# Patient Record
Sex: FEMALE | Race: BLACK OR AFRICAN AMERICAN | URBAN - METROPOLITAN AREA
[De-identification: names, ages, dates, MRNs, and addresses within clinical notes are randomized per-mention and may not be internally consistent; named-entity substitution may affect disease eponyms.]

---

## 2019-12-06 ENCOUNTER — EMERGENCY (EMERGENCY)
Facility: HOSPITAL | Age: 59
LOS: 1 days | Discharge: HOME | End: 2019-12-06
Attending: EMERGENCY MEDICINE | Admitting: EMERGENCY MEDICINE
Payer: MEDICAID

## 2019-12-06 VITALS
OXYGEN SATURATION: 98 % | DIASTOLIC BLOOD PRESSURE: 81 MMHG | TEMPERATURE: 98 F | RESPIRATION RATE: 16 BRPM | HEART RATE: 89 BPM | SYSTOLIC BLOOD PRESSURE: 125 MMHG

## 2019-12-06 PROCEDURE — 99283 EMERGENCY DEPT VISIT LOW MDM: CPT

## 2019-12-06 NOTE — ED ADULT TRIAGE NOTE - CHIEF COMPLAINT QUOTE
pt arrives w/ c/o elevated glucose. pt states she has been off her medication for about 0 months due to her liver being enlarged. pt states she got scared and decided to stop all her medication. pt states she feels lightheaded and jittery. fsx in triage 268. pt appears comfortable and in NAD.

## 2019-12-07 VITALS
RESPIRATION RATE: 18 BRPM | SYSTOLIC BLOOD PRESSURE: 117 MMHG | HEART RATE: 68 BPM | TEMPERATURE: 97 F | DIASTOLIC BLOOD PRESSURE: 70 MMHG | OXYGEN SATURATION: 100 %

## 2019-12-07 LAB
ALBUMIN SERPL ELPH-MCNC: 3.8 G/DL — SIGNIFICANT CHANGE UP (ref 3.3–5)
ALP SERPL-CCNC: 142 U/L — HIGH (ref 40–120)
ALT FLD-CCNC: 22 U/L — SIGNIFICANT CHANGE UP (ref 4–33)
ANION GAP SERPL CALC-SCNC: 12 MMO/L — SIGNIFICANT CHANGE UP (ref 7–14)
APPEARANCE UR: SIGNIFICANT CHANGE UP
AST SERPL-CCNC: 22 U/L — SIGNIFICANT CHANGE UP (ref 4–32)
BACTERIA # UR AUTO: SIGNIFICANT CHANGE UP
BASE EXCESS BLDV CALC-SCNC: 4.3 MMOL/L — SIGNIFICANT CHANGE UP
BASOPHILS # BLD AUTO: 0.02 K/UL — SIGNIFICANT CHANGE UP (ref 0–0.2)
BASOPHILS NFR BLD AUTO: 0.2 % — SIGNIFICANT CHANGE UP (ref 0–2)
BILIRUB SERPL-MCNC: 0.3 MG/DL — SIGNIFICANT CHANGE UP (ref 0.2–1.2)
BILIRUB UR-MCNC: NEGATIVE — SIGNIFICANT CHANGE UP
BLOOD GAS VENOUS - CREATININE: 0.46 MG/DL — LOW (ref 0.5–1.3)
BLOOD UR QL VISUAL: HIGH
BUN SERPL-MCNC: 17 MG/DL — SIGNIFICANT CHANGE UP (ref 7–23)
CALCIUM SERPL-MCNC: 9.8 MG/DL — SIGNIFICANT CHANGE UP (ref 8.4–10.5)
CHLORIDE BLDV-SCNC: 107 MMOL/L — SIGNIFICANT CHANGE UP (ref 96–108)
CHLORIDE SERPL-SCNC: 102 MMOL/L — SIGNIFICANT CHANGE UP (ref 98–107)
CO2 SERPL-SCNC: 27 MMOL/L — SIGNIFICANT CHANGE UP (ref 22–31)
COLOR SPEC: SIGNIFICANT CHANGE UP
CREAT SERPL-MCNC: 0.43 MG/DL — LOW (ref 0.5–1.3)
EOSINOPHIL # BLD AUTO: 0.09 K/UL — SIGNIFICANT CHANGE UP (ref 0–0.5)
EOSINOPHIL NFR BLD AUTO: 1.1 % — SIGNIFICANT CHANGE UP (ref 0–6)
GAS PNL BLDV: 135 MMOL/L — LOW (ref 136–146)
GLUCOSE BLDV-MCNC: 265 MG/DL — HIGH (ref 70–99)
GLUCOSE SERPL-MCNC: 274 MG/DL — HIGH (ref 70–99)
GLUCOSE UR-MCNC: 1000 — HIGH
HCO3 BLDV-SCNC: 26 MMOL/L — SIGNIFICANT CHANGE UP (ref 20–27)
HCT VFR BLD CALC: 38.7 % — SIGNIFICANT CHANGE UP (ref 34.5–45)
HCT VFR BLDV CALC: 39.9 % — SIGNIFICANT CHANGE UP (ref 34.5–45)
HGB BLD-MCNC: 12.5 G/DL — SIGNIFICANT CHANGE UP (ref 11.5–15.5)
HGB BLDV-MCNC: 13 G/DL — SIGNIFICANT CHANGE UP (ref 11.5–15.5)
HYALINE CASTS # UR AUTO: SIGNIFICANT CHANGE UP
IMM GRANULOCYTES NFR BLD AUTO: 0.2 % — SIGNIFICANT CHANGE UP (ref 0–1.5)
KETONES UR-MCNC: HIGH
LACTATE BLDV-MCNC: 1.4 MMOL/L — SIGNIFICANT CHANGE UP (ref 0.5–2)
LEUKOCYTE ESTERASE UR-ACNC: SIGNIFICANT CHANGE UP
LYMPHOCYTES # BLD AUTO: 3.09 K/UL — SIGNIFICANT CHANGE UP (ref 1–3.3)
LYMPHOCYTES # BLD AUTO: 36.4 % — SIGNIFICANT CHANGE UP (ref 13–44)
MCHC RBC-ENTMCNC: 28.6 PG — SIGNIFICANT CHANGE UP (ref 27–34)
MCHC RBC-ENTMCNC: 32.3 % — SIGNIFICANT CHANGE UP (ref 32–36)
MCV RBC AUTO: 88.6 FL — SIGNIFICANT CHANGE UP (ref 80–100)
MONOCYTES # BLD AUTO: 0.57 K/UL — SIGNIFICANT CHANGE UP (ref 0–0.9)
MONOCYTES NFR BLD AUTO: 6.7 % — SIGNIFICANT CHANGE UP (ref 2–14)
NEUTROPHILS # BLD AUTO: 4.69 K/UL — SIGNIFICANT CHANGE UP (ref 1.8–7.4)
NEUTROPHILS NFR BLD AUTO: 55.4 % — SIGNIFICANT CHANGE UP (ref 43–77)
NITRITE UR-MCNC: NEGATIVE — SIGNIFICANT CHANGE UP
NRBC # FLD: 0 K/UL — SIGNIFICANT CHANGE UP (ref 0–0)
PCO2 BLDV: 53 MMHG — HIGH (ref 41–51)
PH BLDV: 7.36 PH — SIGNIFICANT CHANGE UP (ref 7.32–7.43)
PH UR: 6 — SIGNIFICANT CHANGE UP (ref 5–8)
PLATELET # BLD AUTO: 255 K/UL — SIGNIFICANT CHANGE UP (ref 150–400)
PMV BLD: 9.8 FL — SIGNIFICANT CHANGE UP (ref 7–13)
PO2 BLDV: < 24 MMHG — LOW (ref 35–40)
POTASSIUM BLDV-SCNC: 3.8 MMOL/L — SIGNIFICANT CHANGE UP (ref 3.4–4.5)
POTASSIUM SERPL-MCNC: 4.2 MMOL/L — SIGNIFICANT CHANGE UP (ref 3.5–5.3)
POTASSIUM SERPL-SCNC: 4.2 MMOL/L — SIGNIFICANT CHANGE UP (ref 3.5–5.3)
PROT SERPL-MCNC: 7.3 G/DL — SIGNIFICANT CHANGE UP (ref 6–8.3)
PROT UR-MCNC: 100 — HIGH
RBC # BLD: 4.37 M/UL — SIGNIFICANT CHANGE UP (ref 3.8–5.2)
RBC # FLD: 12.4 % — SIGNIFICANT CHANGE UP (ref 10.3–14.5)
RBC CASTS # UR COMP ASSIST: HIGH (ref 0–?)
SAO2 % BLDV: 31.7 % — LOW (ref 60–85)
SODIUM SERPL-SCNC: 141 MMOL/L — SIGNIFICANT CHANGE UP (ref 135–145)
SP GR SPEC: 1.03 — SIGNIFICANT CHANGE UP (ref 1–1.04)
SQUAMOUS # UR AUTO: SIGNIFICANT CHANGE UP
UROBILINOGEN FLD QL: NORMAL — SIGNIFICANT CHANGE UP
WBC # BLD: 8.48 K/UL — SIGNIFICANT CHANGE UP (ref 3.8–10.5)
WBC # FLD AUTO: 8.48 K/UL — SIGNIFICANT CHANGE UP (ref 3.8–10.5)
WBC UR QL: >50 — HIGH (ref 0–?)

## 2019-12-07 RX ORDER — METFORMIN HYDROCHLORIDE 850 MG/1
1 TABLET ORAL
Qty: 60 | Refills: 0
Start: 2019-12-07 | End: 2020-01-05

## 2019-12-07 RX ORDER — SODIUM CHLORIDE 9 MG/ML
1000 INJECTION INTRAMUSCULAR; INTRAVENOUS; SUBCUTANEOUS ONCE
Refills: 0 | Status: COMPLETED | OUTPATIENT
Start: 2019-12-07 | End: 2019-12-07

## 2019-12-07 RX ADMIN — SODIUM CHLORIDE 1000 MILLILITER(S): 9 INJECTION INTRAMUSCULAR; INTRAVENOUS; SUBCUTANEOUS at 03:06

## 2019-12-07 NOTE — ED ADULT NURSE REASSESSMENT NOTE - NS ED NURSE REASSESS COMMENT FT1
pt brought into private room for exam by MD Christina with female RN at bedside. pt endorses pain and burning around vaginal area. advised to treat known infections with already prescribed medications by outpatient doctor. VS as noted.

## 2019-12-07 NOTE — ED PROVIDER NOTE - PATIENT PORTAL LINK FT
You can access the FollowMyHealth Patient Portal offered by [facility] by registering at the following website: [email or web address]. By joining [employer]’s FollowMyHealth portal, you will also be able to view your health information using other applications (apps) compatible with our system.

## 2019-12-07 NOTE — ED PROVIDER NOTE - PROGRESS NOTE DETAILS
ADDISON:  GYN external exam with hyperemic vulva, whitish discharge from vagina, and bilateral ulcerated lesions.  Speculum exam deferred.  RN Mary Guerrero present during exam. ADDISON:  Labs reassuring and not c/w dka, although patient is with ketonuria.  UA with elevated wbc but suspect vaginitis rather than cystitis is culprit.  Will send culture.  Will discharge patient with metformin, which she has tolerated in past.  She has antiviral and antifungal rx filled from gynecologist. ADDISON:  Labs reassuring and not c/w dka, although patient is with ketonuria.  UA with elevated wbc but suspect vaginitis rather than cystitis as culprit. Will discharge patient with metformin, which she has tolerated in past.  She has antiviral and antifungal rx filled from gynecologist.

## 2019-12-07 NOTE — ED ADULT NURSE NOTE - OBJECTIVE STATEMENT
pt received to room 1, ambulatory with walker and also has cane at bedside. pt complains of burning urination, hx vaginitis went to outpatient GYN today. was told by GYN get blood sugar under control. pt type 2 diabetic non compliant with oral meds x 1 year because she was afraid of damaging her liver. pt shares she had a mass on her liver as seen on MRI. at GYN office FS was 325, in triage 268. pt states she regularly checks her sugar and its is in the 300s and 400s. IV placed, labs sent. VS as noted.

## 2019-12-07 NOTE — ED PROVIDER NOTE - CLINICAL SUMMARY MEDICAL DECISION MAKING FREE TEXT BOX
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    VSS AF.  Exam non-toxic appearing.  Suspect hyperglycemia in setting of medication non-compliance.  Will send labs, ua.  Dispo pending.

## 2019-12-07 NOTE — ED PROVIDER NOTE - NSFOLLOWUPINSTRUCTIONS_ED_ALL_ED_FT
1.  Please take metformin as directed.    2.  Return to care for weakness, abdominal pain, vomiting, chest pain, fevers, chills.   3.  Use the list given to you to establish care with a primary care doctor for management of your diabetes.   4  Follow with your gynecologist as scheduled.

## 2019-12-07 NOTE — ED PROVIDER NOTE - OBJECTIVE STATEMENT
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  Patient reports developing vaginal rash, itching and burning ~1 week ago.  States that she went to [place] ED 5 days ago then followed up with outpatient gynecologist one day ago who started patient on valcyclovir, topical acyclovir and diflucan for suspected vaginitis and herpes outbreak.  Patient presented to gynecologist with elevated blood sugar and was told that she needed to come to hospital to get sugar controlled or else prescribed antifungal and antiviral "would not work".  Patient reports taking metformin and another medication for her diabetes, however has been off meds for ~1 year due to "enlarged liver".  Patient denies vomiting, abdominal pain, fevers, chills, chest pain, weakness.  Does not have PCP at this time.

## 2019-12-07 NOTE — ED PROVIDER NOTE - ATTENDING CONTRIBUTION TO CARE
I have personally performed a face to face bedside history and physical examination of this patient. I have discussed the history, examination, review of systems, assessment and plan of management with the resident. I have reviewed the electronic medical record and amended it to reflect my history, review of systems, physical exam, assessment and plan.

## 2019-12-07 NOTE — ED ADULT NURSE NOTE - NSIMPLEMENTINTERV_GEN_ALL_ED
Implemented All Fall Risk Interventions:  [place] to call system. Call bell, personal items and telephone within reach. Instruct patient to call for assistance. Room bathroom lighting operational. Non-slip footwear when patient is off stretcher. Physically safe environment: no spills, clutter or unnecessary equipment. Stretcher in lowest position, wheels locked, appropriate side rails in place. Provide visual cue, wrist band, yellow gown, etc. Monitor gait and stability. Monitor for mental status changes and reorient to person, place, and time. Review medications for side effects contributing to fall risk. Reinforce activity limits and safety measures with patient and family.

## 2021-01-14 ENCOUNTER — EMERGENCY (EMERGENCY)
Facility: HOSPITAL | Age: 61
LOS: 1 days | Discharge: HOME | End: 2021-01-14
Attending: EMERGENCY MEDICINE | Admitting: EMERGENCY MEDICINE
Payer: MEDICAID

## 2021-01-14 VITALS
SYSTOLIC BLOOD PRESSURE: 132 MMHG | OXYGEN SATURATION: 100 % | DIASTOLIC BLOOD PRESSURE: 85 MMHG | TEMPERATURE: 100 F | HEART RATE: 105 BPM | RESPIRATION RATE: 20 BRPM

## 2021-01-14 PROCEDURE — 99284 EMERGENCY DEPT VISIT MOD MDM: CPT

## 2021-01-14 NOTE — ED ADULT TRIAGE NOTE - CHIEF COMPLAINT QUOTE
c/o n+v, cough, fatigue, pain and aches to back of legs. Hx DM, HTN c/o n+v, cough, fatigue, pain and aches to back of legs. Also endorsing escoriations and laceration to vagina that burns with urination. States "I have a diabetic infection." Hx DM, HTN

## 2021-01-15 VITALS
RESPIRATION RATE: 16 BRPM | TEMPERATURE: 100 F | OXYGEN SATURATION: 98 % | SYSTOLIC BLOOD PRESSURE: 134 MMHG | DIASTOLIC BLOOD PRESSURE: 76 MMHG | HEART RATE: 86 BPM

## 2021-01-15 PROBLEM — E11.9 TYPE 2 DIABETES MELLITUS WITHOUT COMPLICATIONS: Chronic | Status: ACTIVE | Noted: 2019-12-07

## 2021-01-15 LAB
ALBUMIN SERPL ELPH-MCNC: 3.9 G/DL — SIGNIFICANT CHANGE UP (ref 3.3–5)
ALP SERPL-CCNC: 133 U/L — HIGH (ref 40–120)
ALT FLD-CCNC: 34 U/L — HIGH (ref 4–33)
ANION GAP SERPL CALC-SCNC: 13 MMOL/L — SIGNIFICANT CHANGE UP (ref 7–14)
APPEARANCE UR: CLEAR — SIGNIFICANT CHANGE UP
AST SERPL-CCNC: 32 U/L — SIGNIFICANT CHANGE UP (ref 4–32)
B PERT DNA SPEC QL NAA+PROBE: SIGNIFICANT CHANGE UP
BACTERIA # UR AUTO: ABNORMAL
BASOPHILS # BLD AUTO: 0 K/UL — SIGNIFICANT CHANGE UP (ref 0–0.2)
BASOPHILS NFR BLD AUTO: 0 % — SIGNIFICANT CHANGE UP (ref 0–2)
BILIRUB SERPL-MCNC: 0.3 MG/DL — SIGNIFICANT CHANGE UP (ref 0.2–1.2)
BILIRUB UR-MCNC: NEGATIVE — SIGNIFICANT CHANGE UP
BLOOD GAS VENOUS COMPREHENSIVE RESULT: SIGNIFICANT CHANGE UP
BUN SERPL-MCNC: 8 MG/DL — SIGNIFICANT CHANGE UP (ref 7–23)
C PNEUM DNA SPEC QL NAA+PROBE: SIGNIFICANT CHANGE UP
CALCIUM SERPL-MCNC: 9.5 MG/DL — SIGNIFICANT CHANGE UP (ref 8.4–10.5)
CHLORIDE SERPL-SCNC: 92 MMOL/L — LOW (ref 98–107)
CO2 SERPL-SCNC: 27 MMOL/L — SIGNIFICANT CHANGE UP (ref 22–31)
COLOR SPEC: YELLOW — SIGNIFICANT CHANGE UP
CREAT SERPL-MCNC: 0.6 MG/DL — SIGNIFICANT CHANGE UP (ref 0.5–1.3)
DIFF PNL FLD: ABNORMAL
EOSINOPHIL # BLD AUTO: 0 K/UL — SIGNIFICANT CHANGE UP (ref 0–0.5)
EOSINOPHIL NFR BLD AUTO: 0 % — SIGNIFICANT CHANGE UP (ref 0–6)
EPI CELLS # UR: 2 /HPF — SIGNIFICANT CHANGE UP (ref 0–5)
FLUAV H1 2009 PAND RNA SPEC QL NAA+PROBE: SIGNIFICANT CHANGE UP
FLUAV H1 RNA SPEC QL NAA+PROBE: SIGNIFICANT CHANGE UP
FLUAV H3 RNA SPEC QL NAA+PROBE: SIGNIFICANT CHANGE UP
FLUAV SUBTYP SPEC NAA+PROBE: SIGNIFICANT CHANGE UP
FLUBV RNA SPEC QL NAA+PROBE: SIGNIFICANT CHANGE UP
GLUCOSE SERPL-MCNC: 421 MG/DL — HIGH (ref 70–99)
GLUCOSE UR QL: ABNORMAL
HADV DNA SPEC QL NAA+PROBE: SIGNIFICANT CHANGE UP
HCOV PNL SPEC NAA+PROBE: SIGNIFICANT CHANGE UP
HCT VFR BLD CALC: 41.3 % — SIGNIFICANT CHANGE UP (ref 34.5–45)
HGB BLD-MCNC: 13.2 G/DL — SIGNIFICANT CHANGE UP (ref 11.5–15.5)
HMPV RNA SPEC QL NAA+PROBE: SIGNIFICANT CHANGE UP
HPIV1 RNA SPEC QL NAA+PROBE: SIGNIFICANT CHANGE UP
HPIV2 RNA SPEC QL NAA+PROBE: SIGNIFICANT CHANGE UP
HPIV3 RNA SPEC QL NAA+PROBE: SIGNIFICANT CHANGE UP
HPIV4 RNA SPEC QL NAA+PROBE: SIGNIFICANT CHANGE UP
IANC: 4.24 K/UL — SIGNIFICANT CHANGE UP (ref 1.5–8.5)
IMM GRANULOCYTES NFR BLD AUTO: 0.5 % — SIGNIFICANT CHANGE UP (ref 0–1.5)
KETONES UR-MCNC: ABNORMAL
LEUKOCYTE ESTERASE UR-ACNC: ABNORMAL
LYMPHOCYTES # BLD AUTO: 1.85 K/UL — SIGNIFICANT CHANGE UP (ref 1–3.3)
LYMPHOCYTES # BLD AUTO: 28.3 % — SIGNIFICANT CHANGE UP (ref 13–44)
MCHC RBC-ENTMCNC: 28.4 PG — SIGNIFICANT CHANGE UP (ref 27–34)
MCHC RBC-ENTMCNC: 32 GM/DL — SIGNIFICANT CHANGE UP (ref 32–36)
MCV RBC AUTO: 88.8 FL — SIGNIFICANT CHANGE UP (ref 80–100)
MONOCYTES # BLD AUTO: 0.41 K/UL — SIGNIFICANT CHANGE UP (ref 0–0.9)
MONOCYTES NFR BLD AUTO: 6.3 % — SIGNIFICANT CHANGE UP (ref 2–14)
NEUTROPHILS # BLD AUTO: 4.24 K/UL — SIGNIFICANT CHANGE UP (ref 1.8–7.4)
NEUTROPHILS NFR BLD AUTO: 64.9 % — SIGNIFICANT CHANGE UP (ref 43–77)
NITRITE UR-MCNC: POSITIVE
NRBC # BLD: 0 /100 WBCS — SIGNIFICANT CHANGE UP
NRBC # FLD: 0 K/UL — SIGNIFICANT CHANGE UP
PH UR: 6.5 — SIGNIFICANT CHANGE UP (ref 5–8)
PLATELET # BLD AUTO: 200 K/UL — SIGNIFICANT CHANGE UP (ref 150–400)
POTASSIUM SERPL-MCNC: 3.8 MMOL/L — SIGNIFICANT CHANGE UP (ref 3.5–5.3)
POTASSIUM SERPL-SCNC: 3.8 MMOL/L — SIGNIFICANT CHANGE UP (ref 3.5–5.3)
PROT SERPL-MCNC: 7.7 G/DL — SIGNIFICANT CHANGE UP (ref 6–8.3)
PROT UR-MCNC: ABNORMAL
RAPID RVP RESULT: DETECTED
RBC # BLD: 4.65 M/UL — SIGNIFICANT CHANGE UP (ref 3.8–5.2)
RBC # FLD: 12.1 % — SIGNIFICANT CHANGE UP (ref 10.3–14.5)
RBC CASTS # UR COMP ASSIST: 0 /HPF — SIGNIFICANT CHANGE UP (ref 0–4)
RV+EV RNA SPEC QL NAA+PROBE: SIGNIFICANT CHANGE UP
SARS-COV-2 RNA SPEC QL NAA+PROBE: DETECTED
SODIUM SERPL-SCNC: 132 MMOL/L — LOW (ref 135–145)
SP GR SPEC: 1.04 — HIGH (ref 1.01–1.02)
UROBILINOGEN FLD QL: SIGNIFICANT CHANGE UP
WBC # BLD: 6.53 K/UL — SIGNIFICANT CHANGE UP (ref 3.8–10.5)
WBC # FLD AUTO: 6.53 K/UL — SIGNIFICANT CHANGE UP (ref 3.8–10.5)
WBC UR QL: 11 /HPF — HIGH (ref 0–5)

## 2021-01-15 PROCEDURE — 93010 ELECTROCARDIOGRAM REPORT: CPT

## 2021-01-15 PROCEDURE — 71045 X-RAY EXAM CHEST 1 VIEW: CPT | Mod: 26

## 2021-01-15 RX ORDER — CEPHALEXIN 500 MG
1 CAPSULE ORAL
Qty: 14 | Refills: 0
Start: 2021-01-15 | End: 2021-01-21

## 2021-01-15 RX ORDER — FLUCONAZOLE 150 MG/1
200 TABLET ORAL ONCE
Refills: 0 | Status: DISCONTINUED | OUTPATIENT
Start: 2021-01-15 | End: 2021-01-15

## 2021-01-15 RX ORDER — CEPHALEXIN 500 MG
500 CAPSULE ORAL ONCE
Refills: 0 | Status: COMPLETED | OUTPATIENT
Start: 2021-01-15 | End: 2021-01-15

## 2021-01-15 RX ORDER — SODIUM CHLORIDE 9 MG/ML
1000 INJECTION INTRAMUSCULAR; INTRAVENOUS; SUBCUTANEOUS ONCE
Refills: 0 | Status: COMPLETED | OUTPATIENT
Start: 2021-01-15 | End: 2021-01-15

## 2021-01-15 RX ORDER — ONDANSETRON 8 MG/1
1 TABLET, FILM COATED ORAL
Qty: 9 | Refills: 0
Start: 2021-01-15 | End: 2021-01-17

## 2021-01-15 RX ORDER — ONDANSETRON 8 MG/1
4 TABLET, FILM COATED ORAL ONCE
Refills: 0 | Status: COMPLETED | OUTPATIENT
Start: 2021-01-15 | End: 2021-01-15

## 2021-01-15 RX ORDER — INSULIN LISPRO 100/ML
8 VIAL (ML) SUBCUTANEOUS ONCE
Refills: 0 | Status: COMPLETED | OUTPATIENT
Start: 2021-01-15 | End: 2021-01-15

## 2021-01-15 RX ORDER — ACYCLOVIR SODIUM 500 MG
400 VIAL (EA) INTRAVENOUS ONCE
Refills: 0 | Status: COMPLETED | OUTPATIENT
Start: 2021-01-15 | End: 2021-01-15

## 2021-01-15 RX ORDER — ACETAMINOPHEN 500 MG
975 TABLET ORAL ONCE
Refills: 0 | Status: COMPLETED | OUTPATIENT
Start: 2021-01-15 | End: 2021-01-15

## 2021-01-15 RX ORDER — ACYCLOVIR SODIUM 500 MG
1 VIAL (EA) INTRAVENOUS
Qty: 21 | Refills: 0
Start: 2021-01-15 | End: 2021-01-21

## 2021-01-15 RX ORDER — CEFTRIAXONE 500 MG/1
1000 INJECTION, POWDER, FOR SOLUTION INTRAMUSCULAR; INTRAVENOUS ONCE
Refills: 0 | Status: DISCONTINUED | OUTPATIENT
Start: 2021-01-15 | End: 2021-01-15

## 2021-01-15 RX ORDER — FLUCONAZOLE 150 MG/1
150 TABLET ORAL ONCE
Refills: 0 | Status: COMPLETED | OUTPATIENT
Start: 2021-01-15 | End: 2021-01-15

## 2021-01-15 RX ADMIN — FLUCONAZOLE 150 MILLIGRAM(S): 150 TABLET ORAL at 02:42

## 2021-01-15 RX ADMIN — Medication 400 MILLIGRAM(S): at 02:37

## 2021-01-15 RX ADMIN — ONDANSETRON 4 MILLIGRAM(S): 8 TABLET, FILM COATED ORAL at 02:37

## 2021-01-15 RX ADMIN — Medication 975 MILLIGRAM(S): at 02:37

## 2021-01-15 RX ADMIN — Medication 500 MILLIGRAM(S): at 03:39

## 2021-01-15 RX ADMIN — Medication 8 UNIT(S): at 03:31

## 2021-01-15 RX ADMIN — SODIUM CHLORIDE 1000 MILLILITER(S): 9 INJECTION INTRAMUSCULAR; INTRAVENOUS; SUBCUTANEOUS at 02:37

## 2021-01-15 NOTE — ED ADULT NURSE NOTE - OBJECTIVE STATEMENT
David RN: Pt presents to TRA AO4 ambulatory complaining of abd pain and vaginal lesions. Pt states "candace been having body aches and nausea, I also have herpes acting up and it hurts when I pee." Pt endorses being T2DM, uncontrolled, stating "I just got my insulin the other day and started using it again." Pt abd soft nontender, denies any chest pain sob or diff breathing, appears comfortable. 20g IV placed to R AC labs drawn and sent and medicated as per EMR.

## 2021-01-15 NOTE — ED PROVIDER NOTE - OBJECTIVE STATEMENT
61yo F hx dm HSV2 p/w 5 days of nausea, body aches, cough, shortness of breath, lightheadedness and subjective fevers. Also notes that she has had worsening burning rash to vagina. Previously had similar sx and tx for fungal infx and HSV in past.

## 2021-01-15 NOTE — PROVIDER CONTACT NOTE (OTHER) - ASSESSMENT
SW contacted pts rosanne Smith ([phone] BERTA contacted pts rosanne Smith ([phone]) to confirm pts address ([address] [place] [address]). BERTA contacted [facility] ([phone]) to arrange ambulette. Trip #[id number]. SW awaiting vendor assignment at this time.

## 2021-01-15 NOTE — ED ADULT NURSE NOTE - CHIEF COMPLAINT QUOTE
c/o n+v, cough, fatigue, pain and aches to back of legs. Also endorsing escoriations and laceration to vagina that burns with urination. States "I have a diabetic infection." Hx DM, HTN

## 2021-01-15 NOTE — ED ADULT NURSE NOTE - CHIEF COMPLAINT
RADIATION ONCOLOGY CONSULT    DATE OF SERVICE: 9/18/2017    IDENTIFICATION: A 61 y.o. female with a pTicN0 invasive ductal carcinoma ultimately grade 3 HER-2/soo triple positive ER 60% DC negative KI 67 10% status post lumpectomy and 2 negative sentinel nodes.  She is here at the kind request of Dr. Atwood.      HISTORY OF PRESENT ILLNESS: Patient's history dates back to June of this year when on 6/19/2017 she found a new nodule in the mid right breast. Spot compression views and ultrasound were recommended and these were performed on 7/6/2017. This confirmed the small irregular nodule in the lower outer right breast and on ultrasound this measured 1.0 x 0.7 x 0.8 there was no suspicious axillary adenopathy. She then underwent a biopsy on 7/19/2017 this revealed a grade 2 ductal carcinoma tumor was ER 60% DC negative KI 67 was 10%. HER-2/soo was 3+ positive. She then underwent a lumpectomy and sentinel node dissection 2 nodes were negative this was done on 8/29/2017. At this point the tumor was grade 3 in the tumor measured 1.6 x 0.9 x 0.7. Again it was an invasive ductal carcinoma and ultimately all margins were negative. Postoperatively she's been doing well she has plans to see Dr. Nick harrison in the next couple weeks.    PAST MEDICAL HISTORY:   Past Medical History:   Diagnosis Date   • Cancer (CMS-HCC)     breast   • High cholesterol    • Hypertension        PAST SURGICAL HISTORY:  Past Surgical History:   Procedure Laterality Date   • LUMPECTOMY Right 8/29/2017    Procedure: LUMPECTOMY;  Surgeon: Chava Atwood M.D.;  Location: SURGERY [facility];  Service: General   • NODE BIOPSY SENTINEL  8/29/2017    Procedure: NODE BIOPSY SENTINEL;  Surgeon: Chava Atwood M.D.;  Location: SURGERY [facility];  Service: General       GYNECOLOGICAL STATUS:G-5, P-1.  1st birth 21, 1st menses 15, LMP 44       HORMONE USE:  BCP x 3 years, no HRT.       CURRENT MEDICATIONS:  Current Outpatient Prescriptions    Medication Sig Dispense Refill   • triamterene-hctz (MAXZIDE-25/DYAZIDE) 37.5-25 MG Tab Take 1 Tab by mouth every day.     • acetaminophen (TYLENOL) 325 MG Tab Take 650 mg by mouth Once.     • amlodipine (NORVASC) 5 MG Tab Take 5 mg by mouth every day.     • phentermine 30 MG capsule Take 30 mg by mouth every morning.     • docusate sodium (COLACE) 100 MG Cap Take 100 mg by mouth every day.     • hydrocodone-acetaminophen (NORCO) 5-325 MG Tab per tablet Take 1-2 Tabs by mouth every four hours as needed. (Patient not taking: Reported on 9/18/2017) 12 Tab 0   • promethazine (PHENERGAN) 25 MG Tab Take 0.5 Tabs by mouth every 6 hours as needed for Nausea/Vomiting. (Patient not taking: Reported on 9/18/2017) 8 Tab 0     No current facility-administered medications for this encounter.        ALLERGIES:    Review of patient's allergies indicates no known allergies.    FAMILY HISTORY:    Family History   Problem Relation Age of Onset   • Cancer Mother    • Lung Disease Mother    • Heart Disease Father    • No Known Problems Sister    • Cancer Maternal Grandmother      bone in leg   • Diabetes Maternal Grandmother    • No Known Problems Sister    [unfilled]        SOCIAL HISTORY:     reports that she has never smoked. She has never used smokeless tobacco. She reports that she drinks alcohol.  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    Lives with: her daughter and granddaughter in [facility].      REVIEW OF SYSTEMS: Pertinent positives consist ofFatigue and a little but a pain in the axilla.  The rest of the review of systems is negative and has been reviewed by me. It is in the nursing note dated 9/18/2017 in [facility]  PHYSICAL EXAM:    1= Restricted in physically strenuous activity, but ambulatory and able to carry out work of a light sedentary nature, e.g., light housework, office work.  Vitals:    09/18/17 1016   BP: 135/79   Pulse: 98   Temp: 37.2 °C (99 °F)   SpO2: 93%   Weight: 122 kg (269 lb)  "  Height: 1.702 m (5' 7\")   Location: Axilla, right   Pain: 1        What makes the pain worse: reaching       GENERAL:Well-appearing alert and oriented ×3 in no apparent distress  Breasts: Bilaterally pendulous with fibroglandular changes no discrete masses she does have postoperative changes in the lower outer quadrant of the right breast consistent with the recent surgery. She also has an axillary scar which is somewhat tender and starting to keloid. Will masses otherwise are appreciated in either breast or axilla.  HEENT:  Pupils are equal, round, and reactive to light.  Extraocular muscles   are intact. Sclerae nonicteric.  Conjunctivae pink.  Oral cavity, tongue   protrudes midline.   NECK:  Supple without evidence of thyromegaly.  NODES:  No peripheral adenopathy of the neck, supraclavicular fossa or axillae   bilaterally.  LUNGS:  Clear to ascultation   HEART:  Regular rate and rhythm.  No murmur appreciated  ABDOMEN:  Soft. No evidence of hepatosplenomegaly.  Positive bowel sounds.  EXTREMITIES:  Without Edema.  NEUROLOGIC:  Cranial nerves II through XII were intact. Normal stance and gait motor and sensory grossly within normal limits        LABORATORY DATA:   Lab Results   Component Value Date/Time    SODIUM 136 08/08/2017 10:13 AM    POTASSIUM 3.9 08/08/2017 10:13 AM    CHLORIDE 98 08/08/2017 10:13 AM    CO2 28 08/08/2017 10:13 AM    GLUCOSE 112 (H) 08/08/2017 10:13 AM    BUN 11 08/08/2017 10:13 AM    CREATININE 0.85 08/08/2017 10:13 AM     Lab Results   Component Value Date/Time    ALKPHOSPHAT 85 08/08/2017 10:13 AM    ASTSGOT 22 08/08/2017 10:13 AM    ALTSGPT 22 08/08/2017 10:13 AM    TBILIRUBIN 0.9 08/08/2017 10:13 AM      Lab Results   Component Value Date/Time    WBC 6.1 08/08/2017 10:13 AM    RBC 5.40 08/08/2017 10:13 AM    HEMOGLOBIN 17.0 (H) 08/08/2017 10:13 AM    HEMATOCRIT 48.7 (H) 08/08/2017 10:13 AM    MCV 90.2 08/08/2017 10:13 AM    MCH 31.5 08/08/2017 10:13 AM    MCHC 34.9 08/08/2017 10:13 " AM    MPV 9.1 08/08/2017 10:13 AM    NEUTSPOLYS 55.10 08/08/2017 10:13 AM    LYMPHOCYTES 33.10 08/08/2017 10:13 AM    MONOCYTES 9.20 08/08/2017 10:13 AM    EOSINOPHILS 1.60 08/08/2017 10:13 AM    BASOPHILS 0.50 08/08/2017 10:13 AM            IMPRESSION:    A 61 y.o. withStage IA, pathologic T1 cN0 breast cancer. It is in the lower outer right breast and is ER 60% SC negative KI 6710% HER-2/soo triple positive grade 3 infiltrating ductal carcinoma status post lumpectomy and sentinel node dissection with negative margins.      RECOMMENDATIONS:   I discussed the patient's she's needs to see Dr. Romero and most likely will undergone Oncotype DX test to check and see if she is a candidate for chemotherapy or not. Once this is done I discussed that radiation therapy would be after chemotherapy if she requires it. If she only requires an aromatase inhibitor then she can start the radiation therapy once she recovers from the surgery.  I've described the details of radiation along with the side effects both acute and chronic, including but not exclusive to fatigue, skin reaction, local soreness, swelling, delayed healing, scarring fibrosis discoloration. Ample time was allowed for questions, and patient understands.  After she has seen Dr. Romero and the type results in she knows to give us a call.      Thank you for the opportunity to participate in her care.  If any questions or comments, please do not hesitate in calling.    Please note that this dictation was created using voice recognition software. I have made every reasonable attempt to correct obvious errors, but I expect that there are errors of grammar and possibly content that I did not discover before finalizing the note.       The patient is a 60y Female complaining of

## 2021-01-15 NOTE — ED PROVIDER NOTE - NS ED ROS FT
CONSTITUTIONAL: no dizziness  EYES: no visual changes, no eye pain  EARS: no ear drainage, no ear pain, no change in hearing  NOSE: no nasal congestion  MOUTH/THROAT: no sore throat  CV: No chest pain, no palpitations  RESP: see hpi  GI: No v/d, no abd pain  : no dysuria, no hematuria, no flank pain  MSK: no back pain, no extremity pain  SKIN: see hpi  NEURO: no headache, no focal weakness, no decreased sensation/paresthesias

## 2021-01-15 NOTE — ED ADULT NURSE REASSESSMENT NOTE - NS ED NURSE REASSESS COMMENT FT1
Break RN: Pt offers no complaints at this time, isolation precautions applied. Awaiting for , pt is discharge.

## 2021-01-15 NOTE — ED ADULT NURSE REASSESSMENT NOTE - NS ED NURSE REASSESS COMMENT FT1
pt resting comfortably in bed. VS as noted. awaiting . pt remains in NAD. denies complaints at this time. bed in lowest position, side rails up, call bell in hand, safety maintained. will continue to monitor.

## 2021-01-15 NOTE — ED PROVIDER NOTE - ATTENDING CONTRIBUTION TO CARE
61 y/o morbidly obese F with h/o DM here with 5 days of body aches, nausea, cough, SOB, subjective fever and chills.  Pt lives at home with mult family members who are still interactive in the community, but denies any specific known sick contacts.  Pt also reports worsening pain and itching to perineal region.  No vomiting or diarrhea, hematuria, urinary urgency or dysuria.  Well appearing, morbidly obese, lying in stretcher, awake and alert, nontoxic.  AF/VSS.  Lungs cta bl.  Cards nl S1/S2, RRR, no MRG.  Abd soft obese ntnd.   region with diffuse red, raised, tender rash to vulva, scattered to prox medial thighs, no fluctuance, blistering/bullae, or crepitus.  Plan for labs, cxr, covid pcr - sxs likely infectious with high suspicion for viral infection (covid 19).  Pt with e/o vulvovaginitis/fungal skin infection to inner thigh/perineal region.  There is no e/o abscess, cellulitis, or fourniers.  WIll treat with fluconazole, supportive care, reassess.

## 2021-01-15 NOTE — ED PROVIDER NOTE - PATIENT PORTAL LINK FT
You can access the FollowMyHealth Patient Portal offered by [facility] by registering at the following website: [email or web address]. By joining [employer]’s FollowMyHealth portal, you will also be able to view your health information using other applications (apps) compatible with our system.

## 2021-01-15 NOTE — ED PROVIDER NOTE - PHYSICAL EXAMINATION
Gen: Morbidly obese, NAD  Head: NCAT  HEENT: PERRL, MMM, normal conjunctiva, anicteric, neck supple  Lung: CTAB, no adventitious sounds  CV: RRR, no murmurs  Abd: soft, NTND, no rebound or guarding, no CVAT  MSK: No edema, no visible deformities  Neuro: Moving all extremity grossly  Skin: 1 herpetic lesion lateral to L vulva, mild eryrthema surrounding introitus w/ scant candidal discharge, no induration to erythematous area, no perineal induration/erythema

## 2021-01-15 NOTE — ED PROVIDER NOTE - NSFOLLOWUPINSTRUCTIONS_ED_ALL_ED_FT
- You will receive a call in 24 hours in regards to follow up for your COVID diagnosis    - You were seen today for COVID-19, vaginal candidiasis, urinary tract infection, and a herpes infection    - Please continue your new antibiotics and antivirals for herpes infection and urinary tract infection    - Keep yourself hydrated. Take tylenol/motrin as directed as needed for fever/aches    - Please follow up with your primary doctor in 48 hours for follow up

## 2021-01-15 NOTE — ED PROVIDER NOTE - CLINICAL SUMMARY MEDICAL DECISION MAKING FREE TEXT BOX
Constitution of viral sx w/ likely candidal rash and hsv infx. No s/s sharad's or perineal cellulitis or herpetic superinfection superimposed. Labs, xr, covid and will tx for hsv/vaginal candidiasis

## 2021-01-18 ENCOUNTER — INPATIENT (INPATIENT)
Facility: HOSPITAL | Age: 61
LOS: 3 days | Discharge: HOME HEALTH | End: 2021-01-22
Attending: HOSPITALIST | Admitting: HOSPITALIST
Payer: MEDICAID

## 2021-01-18 VITALS
OXYGEN SATURATION: 95 % | TEMPERATURE: 98 F | RESPIRATION RATE: 20 BRPM | DIASTOLIC BLOOD PRESSURE: 73 MMHG | SYSTOLIC BLOOD PRESSURE: 126 MMHG | HEART RATE: 103 BPM

## 2021-01-18 DIAGNOSIS — E11.9 TYPE 2 DIABETES MELLITUS WITHOUT COMPLICATIONS: ICD-10-CM

## 2021-01-18 DIAGNOSIS — Z29.9 ENCOUNTER FOR PROPHYLACTIC MEASURES, UNSPECIFIED: ICD-10-CM

## 2021-01-18 DIAGNOSIS — I10 ESSENTIAL (PRIMARY) HYPERTENSION: ICD-10-CM

## 2021-01-18 DIAGNOSIS — N39.0 URINARY TRACT INFECTION, SITE NOT SPECIFIED: ICD-10-CM

## 2021-01-18 DIAGNOSIS — U07.1 COVID-19: ICD-10-CM

## 2021-01-18 LAB
ALBUMIN SERPL ELPH-MCNC: 3.8 G/DL — SIGNIFICANT CHANGE UP (ref 3.3–5)
ALP SERPL-CCNC: 121 U/L — HIGH (ref 40–120)
ALT FLD-CCNC: 34 U/L — HIGH (ref 4–33)
ANION GAP SERPL CALC-SCNC: 10 MMOL/L — SIGNIFICANT CHANGE UP (ref 7–14)
ANION GAP SERPL CALC-SCNC: 13 MMOL/L — SIGNIFICANT CHANGE UP (ref 7–14)
APPEARANCE UR: CLEAR — SIGNIFICANT CHANGE UP
AST SERPL-CCNC: 45 U/L — HIGH (ref 4–32)
B PERT DNA SPEC QL NAA+PROBE: SIGNIFICANT CHANGE UP
B-OH-BUTYR SERPL-SCNC: 0.7 MMOL/L — HIGH (ref 0–0.4)
BASE EXCESS BLDV CALC-SCNC: 6 MMOL/L — HIGH (ref -3–2)
BASOPHILS # BLD AUTO: 0.01 K/UL — SIGNIFICANT CHANGE UP (ref 0–0.2)
BASOPHILS NFR BLD AUTO: 0.1 % — SIGNIFICANT CHANGE UP (ref 0–2)
BILIRUB SERPL-MCNC: 0.4 MG/DL — SIGNIFICANT CHANGE UP (ref 0.2–1.2)
BILIRUB UR-MCNC: NEGATIVE — SIGNIFICANT CHANGE UP
BLOOD GAS VENOUS COMPREHENSIVE RESULT: SIGNIFICANT CHANGE UP
BUN SERPL-MCNC: 6 MG/DL — LOW (ref 7–23)
BUN SERPL-MCNC: 8 MG/DL — SIGNIFICANT CHANGE UP (ref 7–23)
C PNEUM DNA SPEC QL NAA+PROBE: SIGNIFICANT CHANGE UP
CALCIUM SERPL-MCNC: 9.1 MG/DL — SIGNIFICANT CHANGE UP (ref 8.4–10.5)
CALCIUM SERPL-MCNC: 9.3 MG/DL — SIGNIFICANT CHANGE UP (ref 8.4–10.5)
CHLORIDE SERPL-SCNC: 101 MMOL/L — SIGNIFICANT CHANGE UP (ref 98–107)
CHLORIDE SERPL-SCNC: 97 MMOL/L — LOW (ref 98–107)
CO2 SERPL-SCNC: 27 MMOL/L — SIGNIFICANT CHANGE UP (ref 22–31)
CO2 SERPL-SCNC: 27 MMOL/L — SIGNIFICANT CHANGE UP (ref 22–31)
COLOR SPEC: SIGNIFICANT CHANGE UP
CREAT SERPL-MCNC: 0.37 MG/DL — LOW (ref 0.5–1.3)
CREAT SERPL-MCNC: 0.52 MG/DL — SIGNIFICANT CHANGE UP (ref 0.5–1.3)
D DIMER BLD IA.RAPID-MCNC: 272 NG/ML DDU — HIGH
DIFF PNL FLD: NEGATIVE — SIGNIFICANT CHANGE UP
EOSINOPHIL # BLD AUTO: 0 K/UL — SIGNIFICANT CHANGE UP (ref 0–0.5)
EOSINOPHIL NFR BLD AUTO: 0 % — SIGNIFICANT CHANGE UP (ref 0–6)
FERRITIN SERPL-MCNC: 658 NG/ML — HIGH (ref 15–150)
FLUAV H1 2009 PAND RNA SPEC QL NAA+PROBE: SIGNIFICANT CHANGE UP
FLUAV H1 RNA SPEC QL NAA+PROBE: SIGNIFICANT CHANGE UP
FLUAV H3 RNA SPEC QL NAA+PROBE: SIGNIFICANT CHANGE UP
FLUAV SUBTYP SPEC NAA+PROBE: SIGNIFICANT CHANGE UP
FLUBV RNA SPEC QL NAA+PROBE: SIGNIFICANT CHANGE UP
GLUCOSE BLDC GLUCOMTR-MCNC: 364 MG/DL — HIGH (ref 70–99)
GLUCOSE BLDC GLUCOMTR-MCNC: 365 MG/DL — HIGH (ref 70–99)
GLUCOSE BLDC GLUCOMTR-MCNC: 427 MG/DL — HIGH (ref 70–99)
GLUCOSE SERPL-MCNC: 384 MG/DL — HIGH (ref 70–99)
GLUCOSE SERPL-MCNC: 453 MG/DL — CRITICAL HIGH (ref 70–99)
GLUCOSE UR QL: ABNORMAL
HADV DNA SPEC QL NAA+PROBE: SIGNIFICANT CHANGE UP
HCO3 BLDV-SCNC: 28 MMOL/L — HIGH (ref 20–27)
HCOV PNL SPEC NAA+PROBE: SIGNIFICANT CHANGE UP
HCT VFR BLD CALC: 38.9 % — SIGNIFICANT CHANGE UP (ref 34.5–45)
HGB BLD-MCNC: 12.6 G/DL — SIGNIFICANT CHANGE UP (ref 11.5–15.5)
HMPV RNA SPEC QL NAA+PROBE: SIGNIFICANT CHANGE UP
HPIV1 RNA SPEC QL NAA+PROBE: SIGNIFICANT CHANGE UP
HPIV2 RNA SPEC QL NAA+PROBE: SIGNIFICANT CHANGE UP
HPIV3 RNA SPEC QL NAA+PROBE: SIGNIFICANT CHANGE UP
HPIV4 RNA SPEC QL NAA+PROBE: SIGNIFICANT CHANGE UP
IANC: 5.43 K/UL — SIGNIFICANT CHANGE UP (ref 1.5–8.5)
IMM GRANULOCYTES NFR BLD AUTO: 0.4 % — SIGNIFICANT CHANGE UP (ref 0–1.5)
KETONES UR-MCNC: ABNORMAL
LEUKOCYTE ESTERASE UR-ACNC: NEGATIVE — SIGNIFICANT CHANGE UP
LIDOCAIN IGE QN: 20 U/L — SIGNIFICANT CHANGE UP (ref 7–60)
LYMPHOCYTES # BLD AUTO: 1.57 K/UL — SIGNIFICANT CHANGE UP (ref 1–3.3)
LYMPHOCYTES # BLD AUTO: 21 % — SIGNIFICANT CHANGE UP (ref 13–44)
MCHC RBC-ENTMCNC: 28.3 PG — SIGNIFICANT CHANGE UP (ref 27–34)
MCHC RBC-ENTMCNC: 32.4 GM/DL — SIGNIFICANT CHANGE UP (ref 32–36)
MCV RBC AUTO: 87.2 FL — SIGNIFICANT CHANGE UP (ref 80–100)
MONOCYTES # BLD AUTO: 0.42 K/UL — SIGNIFICANT CHANGE UP (ref 0–0.9)
MONOCYTES NFR BLD AUTO: 5.6 % — SIGNIFICANT CHANGE UP (ref 2–14)
NEUTROPHILS # BLD AUTO: 5.43 K/UL — SIGNIFICANT CHANGE UP (ref 1.8–7.4)
NEUTROPHILS NFR BLD AUTO: 72.9 % — SIGNIFICANT CHANGE UP (ref 43–77)
NITRITE UR-MCNC: NEGATIVE — SIGNIFICANT CHANGE UP
NRBC # BLD: 0 /100 WBCS — SIGNIFICANT CHANGE UP
NRBC # FLD: 0 K/UL — SIGNIFICANT CHANGE UP
NT-PROBNP SERPL-SCNC: 20 PG/ML — SIGNIFICANT CHANGE UP
PCO2 BLDV: 53 MMHG — HIGH (ref 41–51)
PH BLDV: 7.38 — SIGNIFICANT CHANGE UP (ref 7.32–7.43)
PH UR: 6.5 — SIGNIFICANT CHANGE UP (ref 5–8)
PLATELET # BLD AUTO: 202 K/UL — SIGNIFICANT CHANGE UP (ref 150–400)
PO2 BLDV: 34 MMHG — LOW (ref 35–40)
POTASSIUM SERPL-MCNC: 3.9 MMOL/L — SIGNIFICANT CHANGE UP (ref 3.5–5.3)
POTASSIUM SERPL-MCNC: 3.9 MMOL/L — SIGNIFICANT CHANGE UP (ref 3.5–5.3)
POTASSIUM SERPL-SCNC: 3.9 MMOL/L — SIGNIFICANT CHANGE UP (ref 3.5–5.3)
POTASSIUM SERPL-SCNC: 3.9 MMOL/L — SIGNIFICANT CHANGE UP (ref 3.5–5.3)
PROCALCITONIN SERPL-MCNC: 0.11 NG/ML — HIGH (ref 0.02–0.1)
PROT SERPL-MCNC: 7.6 G/DL — SIGNIFICANT CHANGE UP (ref 6–8.3)
PROT UR-MCNC: ABNORMAL
RAPID RVP RESULT: DETECTED
RBC # BLD: 4.46 M/UL — SIGNIFICANT CHANGE UP (ref 3.8–5.2)
RBC # FLD: 12.5 % — SIGNIFICANT CHANGE UP (ref 10.3–14.5)
RV+EV RNA SPEC QL NAA+PROBE: SIGNIFICANT CHANGE UP
SAO2 % BLDV: 60.4 % — SIGNIFICANT CHANGE UP (ref 60–85)
SARS-COV-2 RNA SPEC QL NAA+PROBE: DETECTED
SODIUM SERPL-SCNC: 137 MMOL/L — SIGNIFICANT CHANGE UP (ref 135–145)
SODIUM SERPL-SCNC: 138 MMOL/L — SIGNIFICANT CHANGE UP (ref 135–145)
SP GR SPEC: 1.04 — HIGH (ref 1.01–1.02)
TROPONIN T, HIGH SENSITIVITY RESULT: 16 NG/L — SIGNIFICANT CHANGE UP
TROPONIN T, HIGH SENSITIVITY RESULT: 17 NG/L — SIGNIFICANT CHANGE UP
UROBILINOGEN FLD QL: SIGNIFICANT CHANGE UP
WBC # BLD: 7.46 K/UL — SIGNIFICANT CHANGE UP (ref 3.8–10.5)
WBC # FLD AUTO: 7.46 K/UL — SIGNIFICANT CHANGE UP (ref 3.8–10.5)

## 2021-01-18 PROCEDURE — 99284 EMERGENCY DEPT VISIT MOD MDM: CPT

## 2021-01-18 PROCEDURE — 99223 1ST HOSP IP/OBS HIGH 75: CPT | Mod: AI,GC

## 2021-01-18 PROCEDURE — 71045 X-RAY EXAM CHEST 1 VIEW: CPT | Mod: 26

## 2021-01-18 RX ORDER — SODIUM CHLORIDE 9 MG/ML
1000 INJECTION INTRAMUSCULAR; INTRAVENOUS; SUBCUTANEOUS
Refills: 0 | Status: DISCONTINUED | OUTPATIENT
Start: 2021-01-18 | End: 2021-01-22

## 2021-01-18 RX ORDER — CEFTRIAXONE 500 MG/1
1000 INJECTION, POWDER, FOR SOLUTION INTRAMUSCULAR; INTRAVENOUS ONCE
Refills: 0 | Status: COMPLETED | OUTPATIENT
Start: 2021-01-18 | End: 2021-01-18

## 2021-01-18 RX ORDER — INSULIN LISPRO 100/ML
2 VIAL (ML) SUBCUTANEOUS
Refills: 0 | Status: DISCONTINUED | OUTPATIENT
Start: 2021-01-18 | End: 2021-01-19

## 2021-01-18 RX ORDER — INSULIN HUMAN 100 [IU]/ML
10 INJECTION, SOLUTION SUBCUTANEOUS ONCE
Refills: 0 | Status: COMPLETED | OUTPATIENT
Start: 2021-01-18 | End: 2021-01-18

## 2021-01-18 RX ORDER — DEXTROSE 50 % IN WATER 50 %
25 SYRINGE (ML) INTRAVENOUS ONCE
Refills: 0 | Status: DISCONTINUED | OUTPATIENT
Start: 2021-01-18 | End: 2021-01-22

## 2021-01-18 RX ORDER — GLUCAGON INJECTION, SOLUTION 0.5 MG/.1ML
1 INJECTION, SOLUTION SUBCUTANEOUS ONCE
Refills: 0 | Status: DISCONTINUED | OUTPATIENT
Start: 2021-01-18 | End: 2021-01-22

## 2021-01-18 RX ORDER — FAMOTIDINE 10 MG/ML
20 INJECTION INTRAVENOUS DAILY
Refills: 0 | Status: DISCONTINUED | OUTPATIENT
Start: 2021-01-18 | End: 2021-01-22

## 2021-01-18 RX ORDER — INSULIN LISPRO 100/ML
6 VIAL (ML) SUBCUTANEOUS ONCE
Refills: 0 | Status: COMPLETED | OUTPATIENT
Start: 2021-01-18 | End: 2021-01-18

## 2021-01-18 RX ORDER — INSULIN LISPRO 100/ML
VIAL (ML) SUBCUTANEOUS AT BEDTIME
Refills: 0 | Status: DISCONTINUED | OUTPATIENT
Start: 2021-01-18 | End: 2021-01-20

## 2021-01-18 RX ORDER — BENZOCAINE AND MENTHOL 5; 1 G/100ML; G/100ML
1 LIQUID ORAL
Refills: 0 | Status: DISCONTINUED | OUTPATIENT
Start: 2021-01-18 | End: 2021-01-22

## 2021-01-18 RX ORDER — DEXAMETHASONE 0.5 MG/5ML
6 ELIXIR ORAL ONCE
Refills: 0 | Status: COMPLETED | OUTPATIENT
Start: 2021-01-18 | End: 2021-01-18

## 2021-01-18 RX ORDER — CEFTRIAXONE 500 MG/1
INJECTION, POWDER, FOR SOLUTION INTRAMUSCULAR; INTRAVENOUS
Refills: 0 | Status: COMPLETED | OUTPATIENT
Start: 2021-01-18 | End: 2021-01-22

## 2021-01-18 RX ORDER — DEXAMETHASONE 0.5 MG/5ML
6 ELIXIR ORAL ONCE
Refills: 0 | Status: DISCONTINUED | OUTPATIENT
Start: 2021-01-18 | End: 2021-01-18

## 2021-01-18 RX ORDER — INSULIN GLARGINE 100 [IU]/ML
10 INJECTION, SOLUTION SUBCUTANEOUS AT BEDTIME
Refills: 0 | Status: DISCONTINUED | OUTPATIENT
Start: 2021-01-18 | End: 2021-01-19

## 2021-01-18 RX ORDER — ALBUTEROL 90 UG/1
2.5 AEROSOL, METERED ORAL EVERY 6 HOURS
Refills: 0 | Status: DISCONTINUED | OUTPATIENT
Start: 2021-01-18 | End: 2021-01-18

## 2021-01-18 RX ORDER — ACETAMINOPHEN 500 MG
975 TABLET ORAL ONCE
Refills: 0 | Status: COMPLETED | OUTPATIENT
Start: 2021-01-18 | End: 2021-01-18

## 2021-01-18 RX ORDER — INSULIN LISPRO 100/ML
VIAL (ML) SUBCUTANEOUS
Refills: 0 | Status: DISCONTINUED | OUTPATIENT
Start: 2021-01-18 | End: 2021-01-20

## 2021-01-18 RX ORDER — ALBUTEROL 90 UG/1
1 AEROSOL, METERED ORAL EVERY 6 HOURS
Refills: 0 | Status: DISCONTINUED | OUTPATIENT
Start: 2021-01-18 | End: 2021-01-22

## 2021-01-18 RX ORDER — CEFTRIAXONE 500 MG/1
1000 INJECTION, POWDER, FOR SOLUTION INTRAMUSCULAR; INTRAVENOUS EVERY 24 HOURS
Refills: 0 | Status: COMPLETED | OUTPATIENT
Start: 2021-01-19 | End: 2021-01-21

## 2021-01-18 RX ORDER — DEXTROSE 50 % IN WATER 50 %
12.5 SYRINGE (ML) INTRAVENOUS ONCE
Refills: 0 | Status: DISCONTINUED | OUTPATIENT
Start: 2021-01-18 | End: 2021-01-22

## 2021-01-18 RX ORDER — REMDESIVIR 5 MG/ML
100 INJECTION INTRAVENOUS EVERY 24 HOURS
Refills: 0 | Status: COMPLETED | OUTPATIENT
Start: 2021-01-19 | End: 2021-01-22

## 2021-01-18 RX ORDER — FAMOTIDINE 10 MG/ML
20 INJECTION INTRAVENOUS ONCE
Refills: 0 | Status: COMPLETED | OUTPATIENT
Start: 2021-01-18 | End: 2021-01-18

## 2021-01-18 RX ORDER — ENOXAPARIN SODIUM 100 MG/ML
40 INJECTION SUBCUTANEOUS EVERY 12 HOURS
Refills: 0 | Status: DISCONTINUED | OUTPATIENT
Start: 2021-01-18 | End: 2021-01-22

## 2021-01-18 RX ORDER — REMDESIVIR 5 MG/ML
INJECTION INTRAVENOUS
Refills: 0 | Status: COMPLETED | OUTPATIENT
Start: 2021-01-18 | End: 2021-01-22

## 2021-01-18 RX ORDER — DEXAMETHASONE 0.5 MG/5ML
6 ELIXIR ORAL DAILY
Refills: 0 | Status: DISCONTINUED | OUTPATIENT
Start: 2021-01-19 | End: 2021-01-22

## 2021-01-18 RX ORDER — REMDESIVIR 5 MG/ML
200 INJECTION INTRAVENOUS EVERY 24 HOURS
Refills: 0 | Status: COMPLETED | OUTPATIENT
Start: 2021-01-18 | End: 2021-01-18

## 2021-01-18 RX ORDER — ACETAMINOPHEN 500 MG
650 TABLET ORAL ONCE
Refills: 0 | Status: COMPLETED | OUTPATIENT
Start: 2021-01-18 | End: 2021-01-18

## 2021-01-18 RX ORDER — DEXTROSE 50 % IN WATER 50 %
15 SYRINGE (ML) INTRAVENOUS ONCE
Refills: 0 | Status: DISCONTINUED | OUTPATIENT
Start: 2021-01-18 | End: 2021-01-22

## 2021-01-18 RX ORDER — ALBUTEROL 90 UG/1
4 AEROSOL, METERED ORAL ONCE
Refills: 0 | Status: COMPLETED | OUTPATIENT
Start: 2021-01-18 | End: 2021-01-18

## 2021-01-18 RX ADMIN — Medication 2 UNIT(S): at 17:59

## 2021-01-18 RX ADMIN — Medication 6 UNIT(S): at 16:20

## 2021-01-18 RX ADMIN — Medication 6 MILLIGRAM(S): at 15:17

## 2021-01-18 RX ADMIN — SODIUM CHLORIDE 100 MILLILITER(S): 9 INJECTION INTRAMUSCULAR; INTRAVENOUS; SUBCUTANEOUS at 15:17

## 2021-01-18 RX ADMIN — INSULIN GLARGINE 10 UNIT(S): 100 INJECTION, SOLUTION SUBCUTANEOUS at 21:19

## 2021-01-18 RX ADMIN — INSULIN HUMAN 10 UNIT(S): 100 INJECTION, SOLUTION SUBCUTANEOUS at 15:17

## 2021-01-18 RX ADMIN — Medication 100 MILLIGRAM(S): at 12:47

## 2021-01-18 RX ADMIN — CEFTRIAXONE 100 MILLIGRAM(S): 500 INJECTION, POWDER, FOR SOLUTION INTRAMUSCULAR; INTRAVENOUS at 18:25

## 2021-01-18 RX ADMIN — Medication 4: at 21:20

## 2021-01-18 RX ADMIN — REMDESIVIR 500 MILLIGRAM(S): 5 INJECTION INTRAVENOUS at 22:39

## 2021-01-18 RX ADMIN — Medication 975 MILLIGRAM(S): at 12:47

## 2021-01-18 RX ADMIN — ALBUTEROL 4 PUFF(S): 90 AEROSOL, METERED ORAL at 12:48

## 2021-01-18 RX ADMIN — FAMOTIDINE 20 MILLIGRAM(S): 10 INJECTION INTRAVENOUS at 15:17

## 2021-01-18 RX ADMIN — Medication 5: at 17:58

## 2021-01-18 NOTE — CHART NOTE - NSCHARTNOTEFT_GEN_A_CORE
Patient is a 60 year old gentlemen w/ pmh DM, HTN and asthma now admitted with COVID who had presented to hospital with sob, cough, nausea, and body aches. Nurse contacted provider regarding hyperglycemia with FS at 427. The patients only sx is moderate abdominal pain that has been present since admission this am with a lipase found to be wnl earlier today. Otherwise, patient w/o other acute complaints.     Vital Signs Last 24 Hrs  T(C): 37.1 (18 Jan 2021 20:57), Max: 37.1 (18 Jan 2021 20:57)  T(F): 98.7 (18 Jan 2021 20:57), Max: 98.7 (18 Jan 2021 20:57)  HR: 79 (18 Jan 2021 20:57) (70 - 103)  BP: 125/83 (18 Jan 2021 20:57) (105/60 - 146/84)  BP(mean): --  RR: 20 (18 Jan 2021 20:57) (18 - 20)  SpO2: 96% (18 Jan 2021 20:57) (91% - 99%)    Physical Deferred given recent abdominal assessment by primary team w/o evidence of peritoneal signs or warning symptoms and in the interests of avoiding unnecessary COVID exposure. Patient reports no change in sx's per Nurse.     Labs              12.6   7.46  )-----------( 202      ( 18 Jan 2021 13:13 )             38.9   01-18-21 @ 13:13    137  |  97<L>  |  6<L>             --------------------------< 453<HH>     3.9  |  27  | 0.52    eGFR AA: 120  eGFR N-AA: 104    Calcium: 9.3  AST: 45<H>    ALT: 34<H>  AlkPhos: 121<H>  Protein: 7.6  Albumin: 3.8  TBili: 0.4    Assessment:   Patient is 60 year old gentlemen w/ pmh DM, HTN and asthma now admitted with COVID PNA with persistent hyperglycemia overnight on finger stick. Currently on lantus 10 units with sliding scale pre-meal and at bedtime. The patient had BMP earlier today showing bicarbonate wnl and VBG w/ ph wnl. The patient is likely hyperglycemic in the setting of the acute stressor of COVID 19 infection. Pancreatitis unlikely given lipase wnl today. Unlikely DKA given normal bicarbonate and ph. At this time, will hold fluids given the risk of worsening respiratory status in the setting COVID 19. If strongly convincing evidence for DKA then will consider fluids and possible insulin drip w/ MICU consult. However, at this time there is no such evidence especially in the setting of reassuring labs earlier today.     PLAN:   1. Repeat BMP and VBG   2. Order BHB and UA to r/o urine ketones   3. Admelog 4 units given stat per sliding scale   4. Repeat FS in 3-4 hours if elevated w/ escalate and repeat sliding scale    Fortunato Jha MD   Internal Medicine PGY1

## 2021-01-18 NOTE — PATIENT PROFILE ADULT - TOBACCO USE
Patient presents today to have indwelling catheter removed.  Procedure explained to patient.  Retention balloon deflated and 18 [place]  catheter removed without difficulty.  50 mls of urine output measured.  Patient instructed that he should be able to urinate on his own after catheter removal.  If no urine output in 6-8 hours, patient instructed to call for further follow up.  
Never smoker

## 2021-01-18 NOTE — ED PROVIDER NOTE - ATTENDING CONTRIBUTION TO CARE
I performed a history and physical exam of the patient and discussed their management with the resident. I reviewed the resident's note and agree with the documented findings and plan of care. I have edited as appropriate. My medical decision making and observations are found above.  +WOB, +mild exp wheeze otherwise clear

## 2021-01-18 NOTE — H&P ADULT - ASSESSMENT
Patient is a 59 yo F with PMHx of DM, HTN, asthma, recent COVID+, presenting with worsening productive cough and SOB for 1 week, found to require supplemental O2 and have hyperglycemia to >400s. Patient is a 59 yo F with PMHx of DM, HTN, asthma, recent COVID+, presenting with worsening productive cough and SOB for 1 week, found to require supplemental O2 and have hyperglycemia to >400s, starting dexamethasone/ remdesivir.

## 2021-01-18 NOTE — H&P ADULT - HISTORY OF PRESENT ILLNESS
Patient is a 61 yo F with PMHx of DM, HTN, asthma, recent COVID+, presenting with cough and SOB for 1 week. pt dx with covid this week, has her progressive worsening nonproductive cough, SOB at rest and exertion, and fevers. Patient presented to ED recently on 1/15 with 5 days of nausea, body aches, cough, shortness of breath, lightheadedness and subjective fevers found at that time to be COVID positive and have vulvovaginitis/fungal infection to perineal region.     Ran out of albuterol pump at home. Came to ED for further evaluation. RA sat 92%, ambulatory sat 91%.   Patient is a 59 yo F with PMHx of DM, HTN, asthma, recent COVID+, presenting with cough and SOB for 1 week. Patient presented to ED recently on 1/15 with 5 days of nausea, body aches, cough, shortness of breath, lightheadedness and subjective fevers found at that time to be COVID positive and have vulvovaginitis/fungal infection to perineal region. This time, patient states she has developed yellow colored productive cough, worsening midsternal pain with cough and increasing SOB on exertion and at rest. Has associated fevers. Feels congested. Has associated dizziness and occasional blurry vision due to her symptoms. In addition, patient states she has not been taking her medications/ insulin for few days due to her generalized malaise. Has spinal stenosis and unable to get comfortable or sit up that would normally improve her SOB mildly.     In ED: 98.4, 103HR, 126/73, RA sat 92%, ambulatory sat 91%. Received tylenol, tessalon, dexamethasone, famotidine, humulin, IVF.

## 2021-01-18 NOTE — H&P ADULT - PROBLEM SELECTOR PLAN 2
Patient has history of HTN. Unsure of which medications she takes. Patient found to be hyperglycemic to >400s on admission. She states she takes novolin 70/30, 30u BID  - lantus 10u, premeal admelog 2u with ISS

## 2021-01-18 NOTE — ED PROVIDER NOTE - PROGRESS NOTE DETAILS
Hiro Sellers PGY3  amb sat 91% with increased WOB, giving steroids, pepcid, admitting for further management

## 2021-01-18 NOTE — ED ADULT NURSE NOTE - NSSEPSISSUSPECTED_ED_A_ED
[address]  [phone]               Thank you for choosing us for your health care visit with Hailey Kwong.  MD Cheryl.  We are glad to serve you and happy to provide you with this summar No

## 2021-01-18 NOTE — H&P ADULT - PROBLEM SELECTOR PLAN 4
Anticoagulation:  Diet:  Consults:  Code: Full Patient has history of HTN. Unsure of which medications she takes.   - Monitor BP now

## 2021-01-18 NOTE — ED PROVIDER NOTE - OBJECTIVE STATEMENT
61 yo female with pmhx of DM, HTN, asthma, recent covid, presenting with cough and SOB for 2 weeks. pt dx with covid 2 weeks ago, has her progressive worsening nonproductive cough, SOB at rest and exertion,  and fevers. Ran out of albuterol pump at home. came to ED for further evaluation. RA sat 92%, ambulatory sat 91%.    Denies N/V/D, LOC, CP, leg swelling 59 yo female with pmhx of DM, HTN, asthma, recent covid, presenting with cough and SOB for 1 week. pt dx with covid this week, has her progressive worsening nonproductive cough, SOB at rest and exertion,  and fevers. Ran out of albuterol pump at home. came to ED for further evaluation. RA sat 92%, ambulatory sat 91%.    Denies N/V/D, LOC, CP, leg swelling

## 2021-01-18 NOTE — H&P ADULT - ATTENDING COMMENTS
Patient seen and examined with team in ED  a 61 yo F with PMHx of DM, HTN, asthma, recent COVID+, presenting with worsening productive cough and SOB for 1 week, found to require supplemental O2 and have hyperglycemia to >400s.  PE  #Covid19 start remdesivir and decadron, c/w NC oxygen. mnitor bun/creat and Lft on Remdesivir  # UTI- Ceftriaxone  # DM lantus 10 u qhs with premeal 2 u with fs and ss coverage  #DVT proph lovenox sq Patient seen and examined with team .  Agree with above A/p by Dr Denton  a 61 yo F with PMHx of DM, HTN, asthma, recent COVID+, presenting with worsening productive cough and SOB for 1 week, found to require supplemental O2 and have hyperglycemia to >400s.  PE T37, Hr 78, /60, R 20 sat 99 %on NC oxygen. Obses F. Lungs cta , cor rrr, abd obese soft n/t, ext - neg e/c/c  #Covid19 start remdesivir and decadron, c/w NC oxygen. mnitor bun/creat and Lft on Remdesivir. inc spirometry  # UTI- Ceftriaxone  # DM lantus 10 u qhs with premeal 2 u with fs and ss coverage  #DVT proph lovenox sq Patient seen and examined with team .  Agree with above A/p by Dr Denton  a 61 yo F with PMHx of DM, HTN, asthma, recent COVID+, presenting with worsening productive cough and SOB for 1 week, found to require supplemental O2 and have hyperglycemia to >400s.  PE T37, Hr 78, /60, R 20 sat 99 %on NC oxygen. Obses F. Lungs cta , cor rrr, abd obese soft n/t, ext - neg e/c/c  #Covid19 start remdesivir and decadron, c/w NC oxygen. mnitor bun/creat and Lft on Remdesivir. inc spirometry  # UTI- Ceftriaxone  # DM lantus 10 u qhs with premeal 2 u with fs and ss coverage  #DVT proph lovenox sq  condition fair  Called Son Luis at [phone]- left message on VM - called to update

## 2021-01-18 NOTE — ED PROVIDER NOTE - CLINICAL SUMMARY MEDICAL DECISION MAKING FREE TEXT BOX
61 yo female with pmhx of DM, HTN, asthma, recent covid, presenting with cough and SOB for 2 weeks. suggestive of persistent covid19 infection. will evaluate for oxygen requirement with ambulatory sat, will obtain cbc, cmp vbg trop, bnp, ekg, cxr, will give albuterol, tylenol, tessalon perle and will reassess 61 yo female with pmhx of DM, HTN, asthma, recent covid, presenting with cough and SOB for 1 weeks. suggestive of persistent covid19 infection. will evaluate for oxygen requirement with ambulatory sat, will obtain cbc, cmp vbg trop, bnp, ekg, cxr, will give albuterol, tylenol, tessalon perle and will reassess

## 2021-01-18 NOTE — ED ADULT NURSE NOTE - OBJECTIVE STATEMENT
David RN- PT AOx4, ambulatory with cane tested positive for COVID 1/14 c/o worsening cough and left side chest pain under breast secondary to cough, and also c/o diarrhea. PT on room air sating at 97%, ambulatory sat done on pt desat to 91% on room air. PT endorses SOB when ambulating. pt placed on cardiac monitor NSR, David RN- PT AOx4, ambulatory with cane tested positive for COVID 1/14 c/o worsening cough and left side chest pain under breast secondary to cough, and also c/o diarrhea. Pt states symptoms started 2 weeks ago. PT on room air sating at 97%, ambulatory sat done on pt desat to 91% on room air. PT endorses SOB when ambulating. pt placed on cardiac monitor NSR, tachypneic no accessory muscles being used. pt denies chest pain, fever, chills, dizziness. 20g placed in right AC, labs sent, report given to primary RN Godfrey.

## 2021-01-18 NOTE — H&P ADULT - NSHPPHYSICALEXAM_GEN_ALL_CORE
PHYSICAL EXAM:  GENERAL: NAD, lying in bed comfortably  HEAD:  Atraumatic, Normocephalic  EYES: EOMI, PERRLA, conjunctiva and sclera clear  ENT: Moist mucous membranes  NECK: Supple, No JVD  CHEST/LUNG: Clear to auscultation bilaterally; No rales, rhonchi, wheezing, or rubs. Unlabored respirations  HEART: Regular rate and rhythm; No murmurs, rubs, or gallops  ABDOMEN: Bowel sounds present; Soft, Nontender, Nondistended. No hepatomegally  EXTREMITIES:  2+ Peripheral Pulses, brisk capillary refill. No clubbing, cyanosis, or edema  NERVOUS SYSTEM:  Alert & Oriented X3, speech clear. No deficits   MSK: FROM all 4 extremities, full and equal strength  SKIN: No rashes or lesions PHYSICAL EXAM:  GENERAL: NAD, lying in bed with difficulty with wet cough  HEAD:  Atraumatic, Normocephalic  EYES: EOMI, conjunctiva and sclera clear  ENT: Moist mucous membranes  NECK: Supple,   CHEST/LUNG: Shallow breathing and limited 2/2 body habitus, mild crackles. Unlabored respirations.  HEART: Limited 2/2 body habitus, grossly regular rate and rhythm; No murmurs, rubs, or gallops  ABDOMEN: Tenderness to epigastric region/ upper abdomen.  EXTREMITIES:   No clubbing, cyanosis, or edema  NERVOUS SYSTEM:  Alert & Oriented X3, speech clear. No deficits   MSK: FROM all 4 extremities, full and equal strength

## 2021-01-18 NOTE — H&P ADULT - PROBLEM SELECTOR PLAN 1
Patient with recent COVID + with increasing productive cough and SOB at rest and exertion. Resting O2 92% that drops to 91% on ambulation.   - VS stable, afebrile currently  - No leukocytosis,   - Troponin wnl  - elevated DDimer, Patient with recent COVID + with increasing productive cough and SOB at rest and exertion. Resting O2 92% that drops to 91% on ambulation.   - VS stable, afebrile currently  - No leukocytosis,   - Troponin wnl   - elevated DDimer, ferritin,   - C/w dexamethasone x10d, remdesivir x3d  - albuterol as needed.

## 2021-01-18 NOTE — H&P ADULT - PROBLEM SELECTOR PROBLEM 3
Type 2 diabetes mellitus without complication, without long-term current use of insulin UTI (urinary tract infection)

## 2021-01-18 NOTE — H&P ADULT - NSICDXPASTMEDICALHX_GEN_ALL_CORE_FT
PAST MEDICAL HISTORY:  Asthma     HTN (hypertension)     Type 2 diabetes mellitus without complication, without long-term current use of insulin      PAST MEDICAL HISTORY:  Asthma     Asthma with bronchitis     HTN (hypertension)     Type 2 diabetes mellitus without complication, without long-term current use of insulin

## 2021-01-18 NOTE — H&P ADULT - PROBLEM SELECTOR PLAN 3
Patient found to be hyperglycemic to >400s on admission. She states she has not Has recent history of positive UA with klebsiella 1/15  - c/w CTX

## 2021-01-19 LAB
A1C WITH ESTIMATED AVERAGE GLUCOSE RESULT: 12.1 % — HIGH (ref 4–5.6)
ANION GAP SERPL CALC-SCNC: 13 MMOL/L — SIGNIFICANT CHANGE UP (ref 7–14)
BUN SERPL-MCNC: 8 MG/DL — SIGNIFICANT CHANGE UP (ref 7–23)
CALCIUM SERPL-MCNC: 9.4 MG/DL — SIGNIFICANT CHANGE UP (ref 8.4–10.5)
CHLORIDE SERPL-SCNC: 101 MMOL/L — SIGNIFICANT CHANGE UP (ref 98–107)
CO2 SERPL-SCNC: 26 MMOL/L — SIGNIFICANT CHANGE UP (ref 22–31)
CREAT SERPL-MCNC: 0.41 MG/DL — LOW (ref 0.5–1.3)
ESTIMATED AVERAGE GLUCOSE: 301 MG/DL — HIGH (ref 68–114)
GLUCOSE BLDC GLUCOMTR-MCNC: 304 MG/DL — HIGH (ref 70–99)
GLUCOSE BLDC GLUCOMTR-MCNC: 305 MG/DL — HIGH (ref 70–99)
GLUCOSE BLDC GLUCOMTR-MCNC: 317 MG/DL — HIGH (ref 70–99)
GLUCOSE BLDC GLUCOMTR-MCNC: 386 MG/DL — HIGH (ref 70–99)
GLUCOSE BLDC GLUCOMTR-MCNC: 402 MG/DL — HIGH (ref 70–99)
GLUCOSE BLDC GLUCOMTR-MCNC: 407 MG/DL — HIGH (ref 70–99)
GLUCOSE SERPL-MCNC: 327 MG/DL — HIGH (ref 70–99)
HCT VFR BLD CALC: 36.6 % — SIGNIFICANT CHANGE UP (ref 34.5–45)
HCV AB S/CO SERPL IA: 0.11 S/CO — SIGNIFICANT CHANGE UP (ref 0–0.99)
HCV AB SERPL-IMP: SIGNIFICANT CHANGE UP
HGB BLD-MCNC: 12 G/DL — SIGNIFICANT CHANGE UP (ref 11.5–15.5)
LIDOCAIN IGE QN: 16 U/L — SIGNIFICANT CHANGE UP (ref 7–60)
MAGNESIUM SERPL-MCNC: 2 MG/DL — SIGNIFICANT CHANGE UP (ref 1.6–2.6)
MCHC RBC-ENTMCNC: 28.8 PG — SIGNIFICANT CHANGE UP (ref 27–34)
MCHC RBC-ENTMCNC: 32.8 GM/DL — SIGNIFICANT CHANGE UP (ref 32–36)
MCV RBC AUTO: 87.8 FL — SIGNIFICANT CHANGE UP (ref 80–100)
NRBC # BLD: 0 /100 WBCS — SIGNIFICANT CHANGE UP
NRBC # FLD: 0 K/UL — SIGNIFICANT CHANGE UP
PHOSPHATE SERPL-MCNC: 2.5 MG/DL — SIGNIFICANT CHANGE UP (ref 2.5–4.5)
PLATELET # BLD AUTO: 213 K/UL — SIGNIFICANT CHANGE UP (ref 150–400)
POTASSIUM SERPL-MCNC: 3.8 MMOL/L — SIGNIFICANT CHANGE UP (ref 3.5–5.3)
POTASSIUM SERPL-SCNC: 3.8 MMOL/L — SIGNIFICANT CHANGE UP (ref 3.5–5.3)
RBC # BLD: 4.17 M/UL — SIGNIFICANT CHANGE UP (ref 3.8–5.2)
RBC # FLD: 12.6 % — SIGNIFICANT CHANGE UP (ref 10.3–14.5)
SODIUM SERPL-SCNC: 140 MMOL/L — SIGNIFICANT CHANGE UP (ref 135–145)
WBC # BLD: 4.96 K/UL — SIGNIFICANT CHANGE UP (ref 3.8–10.5)
WBC # FLD AUTO: 4.96 K/UL — SIGNIFICANT CHANGE UP (ref 3.8–10.5)

## 2021-01-19 PROCEDURE — 76700 US EXAM ABDOM COMPLETE: CPT | Mod: 26

## 2021-01-19 PROCEDURE — 99233 SBSQ HOSP IP/OBS HIGH 50: CPT | Mod: GC

## 2021-01-19 RX ORDER — INSULIN LISPRO 100/ML
5 VIAL (ML) SUBCUTANEOUS
Refills: 0 | Status: DISCONTINUED | OUTPATIENT
Start: 2021-01-19 | End: 2021-01-20

## 2021-01-19 RX ORDER — INSULIN GLARGINE 100 [IU]/ML
20 INJECTION, SOLUTION SUBCUTANEOUS AT BEDTIME
Refills: 0 | Status: DISCONTINUED | OUTPATIENT
Start: 2021-01-19 | End: 2021-01-20

## 2021-01-19 RX ORDER — INSULIN LISPRO 100/ML
3 VIAL (ML) SUBCUTANEOUS ONCE
Refills: 0 | Status: COMPLETED | OUTPATIENT
Start: 2021-01-19 | End: 2021-01-19

## 2021-01-19 RX ORDER — INSULIN LISPRO 100/ML
4 VIAL (ML) SUBCUTANEOUS ONCE
Refills: 0 | Status: DISCONTINUED | OUTPATIENT
Start: 2021-01-19 | End: 2021-01-19

## 2021-01-19 RX ORDER — CEPHALEXIN 500 MG
1 CAPSULE ORAL
Qty: 0 | Refills: 0 | DISCHARGE
End: 2021-01-21

## 2021-01-19 RX ORDER — ACETAMINOPHEN 500 MG
650 TABLET ORAL EVERY 6 HOURS
Refills: 0 | Status: DISCONTINUED | OUTPATIENT
Start: 2021-01-19 | End: 2021-01-22

## 2021-01-19 RX ORDER — ACYCLOVIR SODIUM 500 MG
1 VIAL (EA) INTRAVENOUS
Qty: 0 | Refills: 0 | DISCHARGE
End: 2021-01-21

## 2021-01-19 RX ORDER — ACYCLOVIR SODIUM 500 MG
400 VIAL (EA) INTRAVENOUS THREE TIMES A DAY
Refills: 0 | Status: DISCONTINUED | OUTPATIENT
Start: 2021-01-19 | End: 2021-01-22

## 2021-01-19 RX ADMIN — Medication 100 MILLIGRAM(S): at 14:52

## 2021-01-19 RX ADMIN — Medication 5 UNIT(S): at 12:04

## 2021-01-19 RX ADMIN — Medication 100 MILLIGRAM(S): at 11:31

## 2021-01-19 RX ADMIN — Medication 100 MILLIGRAM(S): at 21:44

## 2021-01-19 RX ADMIN — SODIUM CHLORIDE 100 MILLILITER(S): 9 INJECTION INTRAMUSCULAR; INTRAVENOUS; SUBCUTANEOUS at 04:21

## 2021-01-19 RX ADMIN — Medication 1 APPLICATORFUL: at 21:38

## 2021-01-19 RX ADMIN — Medication 650 MILLIGRAM(S): at 14:52

## 2021-01-19 RX ADMIN — INSULIN GLARGINE 20 UNIT(S): 100 INJECTION, SOLUTION SUBCUTANEOUS at 21:46

## 2021-01-19 RX ADMIN — ENOXAPARIN SODIUM 40 MILLIGRAM(S): 100 INJECTION SUBCUTANEOUS at 17:20

## 2021-01-19 RX ADMIN — FAMOTIDINE 20 MILLIGRAM(S): 10 INJECTION INTRAVENOUS at 12:05

## 2021-01-19 RX ADMIN — REMDESIVIR 500 MILLIGRAM(S): 5 INJECTION INTRAVENOUS at 22:30

## 2021-01-19 RX ADMIN — Medication 6 MILLIGRAM(S): at 04:14

## 2021-01-19 RX ADMIN — CEFTRIAXONE 100 MILLIGRAM(S): 500 INJECTION, POWDER, FOR SOLUTION INTRAMUSCULAR; INTRAVENOUS at 19:00

## 2021-01-19 RX ADMIN — Medication 5 UNIT(S): at 17:20

## 2021-01-19 RX ADMIN — BENZOCAINE AND MENTHOL 1 LOZENGE: 5; 1 LIQUID ORAL at 04:23

## 2021-01-19 RX ADMIN — ENOXAPARIN SODIUM 40 MILLIGRAM(S): 100 INJECTION SUBCUTANEOUS at 04:15

## 2021-01-19 RX ADMIN — Medication 2 UNIT(S): at 08:39

## 2021-01-19 RX ADMIN — Medication 400 MILLIGRAM(S): at 17:21

## 2021-01-19 RX ADMIN — Medication 4: at 21:48

## 2021-01-19 RX ADMIN — Medication 4: at 08:39

## 2021-01-19 RX ADMIN — Medication 5: at 12:02

## 2021-01-19 RX ADMIN — Medication 650 MILLIGRAM(S): at 00:45

## 2021-01-19 RX ADMIN — Medication 3 UNIT(S): at 04:37

## 2021-01-19 RX ADMIN — BENZOCAINE AND MENTHOL 1 LOZENGE: 5; 1 LIQUID ORAL at 17:19

## 2021-01-19 RX ADMIN — Medication 6: at 17:20

## 2021-01-19 NOTE — PROGRESS NOTE ADULT - PROBLEM SELECTOR PLAN 2
Patient found to be hyperglycemic to >400s on admission. She states she takes novolin 70/30, 30u BID  - lantus and premeal admelog. Will increase as needed for better glycemic control  - BHB 0.7, No acidosis on metabolic panel

## 2021-01-19 NOTE — PROGRESS NOTE ADULT - PROBLEM SELECTOR PLAN 1
Patient with recent COVID + with increasing productive cough and SOB at rest and exertion. Resting O2 92% that drops to 91% on ambulation.   - VS stable, afebrile currently  - No leukocytosis,   - Troponin wnl   - elevated DDimer, ferritin,   - C/w dexamethasone x10d, remdesivir x3d  - albuterol as needed. tessalon perls

## 2021-01-19 NOTE — ED POST DISCHARGE NOTE - RESULT SUMMARY
UCX: Klebsiella pneumoniae > 100,000 No antibiotic listed in ED provider note or prescription writer at time of discharge. S/W patient's son who said patient admitted to [facility] for infection. Admitting team to follow up with patient's UCX results.

## 2021-01-19 NOTE — PROGRESS NOTE ADULT - ATTENDING COMMENTS
Patient seen and examined with team  Agree with above A/p by Dr Denton  Patient is a 61 yo F with PMHx of DM, HTN, asthma, recent COVID+, presenting with worsening productive cough and SOB for 1 week, found to require supplemental O2 and have hyperglycemia to >400s, starting dexamethasone/ remdesivir.  A/P   # Covid19 PNA c/w NC oxygen and Remdesivir and Dexa . Start tessolon perals q 4 prn for cough  # Hyperglycemia C/w Lantus but increase to 20 u qhs , with 5 u short acting with ,eals and ss coverage  #DVT proph lovenox sq 40 mg q12

## 2021-01-20 DIAGNOSIS — E78.5 HYPERLIPIDEMIA, UNSPECIFIED: ICD-10-CM

## 2021-01-20 DIAGNOSIS — I10 ESSENTIAL (PRIMARY) HYPERTENSION: ICD-10-CM

## 2021-01-20 DIAGNOSIS — E11.65 TYPE 2 DIABETES MELLITUS WITH HYPERGLYCEMIA: ICD-10-CM

## 2021-01-20 DIAGNOSIS — R73.9 HYPERGLYCEMIA, UNSPECIFIED: ICD-10-CM

## 2021-01-20 LAB
ANION GAP SERPL CALC-SCNC: 13 MMOL/L — SIGNIFICANT CHANGE UP (ref 7–14)
BUN SERPL-MCNC: 10 MG/DL — SIGNIFICANT CHANGE UP (ref 7–23)
CALCIUM SERPL-MCNC: 8.9 MG/DL — SIGNIFICANT CHANGE UP (ref 8.4–10.5)
CHLORIDE SERPL-SCNC: 104 MMOL/L — SIGNIFICANT CHANGE UP (ref 98–107)
CO2 SERPL-SCNC: 25 MMOL/L — SIGNIFICANT CHANGE UP (ref 22–31)
CREAT SERPL-MCNC: 0.43 MG/DL — LOW (ref 0.5–1.3)
GLUCOSE BLDC GLUCOMTR-MCNC: 252 MG/DL — HIGH (ref 70–99)
GLUCOSE BLDC GLUCOMTR-MCNC: 357 MG/DL — HIGH (ref 70–99)
GLUCOSE BLDC GLUCOMTR-MCNC: 417 MG/DL — HIGH (ref 70–99)
GLUCOSE BLDC GLUCOMTR-MCNC: 430 MG/DL — HIGH (ref 70–99)
GLUCOSE SERPL-MCNC: 339 MG/DL — HIGH (ref 70–99)
HCT VFR BLD CALC: 36.2 % — SIGNIFICANT CHANGE UP (ref 34.5–45)
HGB BLD-MCNC: 11.7 G/DL — SIGNIFICANT CHANGE UP (ref 11.5–15.5)
MAGNESIUM SERPL-MCNC: 1.8 MG/DL — SIGNIFICANT CHANGE UP (ref 1.6–2.6)
MCHC RBC-ENTMCNC: 28.5 PG — SIGNIFICANT CHANGE UP (ref 27–34)
MCHC RBC-ENTMCNC: 32.3 GM/DL — SIGNIFICANT CHANGE UP (ref 32–36)
MCV RBC AUTO: 88.1 FL — SIGNIFICANT CHANGE UP (ref 80–100)
NRBC # BLD: 0 /100 WBCS — SIGNIFICANT CHANGE UP
NRBC # FLD: 0 K/UL — SIGNIFICANT CHANGE UP
PHOSPHATE SERPL-MCNC: 2.2 MG/DL — LOW (ref 2.5–4.5)
PLATELET # BLD AUTO: 235 K/UL — SIGNIFICANT CHANGE UP (ref 150–400)
POTASSIUM SERPL-MCNC: 3.3 MMOL/L — LOW (ref 3.5–5.3)
POTASSIUM SERPL-SCNC: 3.3 MMOL/L — LOW (ref 3.5–5.3)
RBC # BLD: 4.11 M/UL — SIGNIFICANT CHANGE UP (ref 3.8–5.2)
RBC # FLD: 12.8 % — SIGNIFICANT CHANGE UP (ref 10.3–14.5)
SODIUM SERPL-SCNC: 142 MMOL/L — SIGNIFICANT CHANGE UP (ref 135–145)
WBC # BLD: 9.23 K/UL — SIGNIFICANT CHANGE UP (ref 3.8–10.5)
WBC # FLD AUTO: 9.23 K/UL — SIGNIFICANT CHANGE UP (ref 3.8–10.5)

## 2021-01-20 PROCEDURE — 99255 IP/OBS CONSLTJ NEW/EST HI 80: CPT

## 2021-01-20 PROCEDURE — 99233 SBSQ HOSP IP/OBS HIGH 50: CPT | Mod: GC

## 2021-01-20 RX ORDER — INSULIN LISPRO 100/ML
12 VIAL (ML) SUBCUTANEOUS
Refills: 0 | Status: DISCONTINUED | OUTPATIENT
Start: 2021-01-20 | End: 2021-01-21

## 2021-01-20 RX ORDER — INSULIN GLARGINE 100 [IU]/ML
25 INJECTION, SOLUTION SUBCUTANEOUS AT BEDTIME
Refills: 0 | Status: DISCONTINUED | OUTPATIENT
Start: 2021-01-20 | End: 2021-01-20

## 2021-01-20 RX ORDER — INSULIN LISPRO 100/ML
VIAL (ML) SUBCUTANEOUS AT BEDTIME
Refills: 0 | Status: DISCONTINUED | OUTPATIENT
Start: 2021-01-20 | End: 2021-01-22

## 2021-01-20 RX ORDER — INSULIN LISPRO 100/ML
VIAL (ML) SUBCUTANEOUS
Refills: 0 | Status: DISCONTINUED | OUTPATIENT
Start: 2021-01-20 | End: 2021-01-22

## 2021-01-20 RX ORDER — INSULIN LISPRO 100/ML
8 VIAL (ML) SUBCUTANEOUS
Refills: 0 | Status: DISCONTINUED | OUTPATIENT
Start: 2021-01-20 | End: 2021-01-20

## 2021-01-20 RX ORDER — POTASSIUM PHOSPHATE, MONOBASIC POTASSIUM PHOSPHATE, DIBASIC 236; 224 MG/ML; MG/ML
30 INJECTION, SOLUTION INTRAVENOUS ONCE
Refills: 0 | Status: COMPLETED | OUTPATIENT
Start: 2021-01-20 | End: 2021-01-20

## 2021-01-20 RX ORDER — AMLODIPINE BESYLATE 2.5 MG/1
2.5 TABLET ORAL DAILY
Refills: 0 | Status: DISCONTINUED | OUTPATIENT
Start: 2021-01-20 | End: 2021-01-22

## 2021-01-20 RX ORDER — INSULIN GLARGINE 100 [IU]/ML
28 INJECTION, SOLUTION SUBCUTANEOUS AT BEDTIME
Refills: 0 | Status: DISCONTINUED | OUTPATIENT
Start: 2021-01-20 | End: 2021-01-22

## 2021-01-20 RX ORDER — POTASSIUM CHLORIDE 20 MEQ
40 PACKET (EA) ORAL ONCE
Refills: 0 | Status: COMPLETED | OUTPATIENT
Start: 2021-01-20 | End: 2021-01-20

## 2021-01-20 RX ORDER — LOSARTAN POTASSIUM 100 MG/1
25 TABLET, FILM COATED ORAL DAILY
Refills: 0 | Status: DISCONTINUED | OUTPATIENT
Start: 2021-01-20 | End: 2021-01-22

## 2021-01-20 RX ADMIN — Medication 12: at 17:34

## 2021-01-20 RX ADMIN — Medication 5: at 09:16

## 2021-01-20 RX ADMIN — ENOXAPARIN SODIUM 40 MILLIGRAM(S): 100 INJECTION SUBCUTANEOUS at 04:10

## 2021-01-20 RX ADMIN — Medication 100 MILLIGRAM(S): at 12:59

## 2021-01-20 RX ADMIN — Medication 12: at 12:52

## 2021-01-20 RX ADMIN — Medication 12 UNIT(S): at 17:34

## 2021-01-20 RX ADMIN — Medication 12 UNIT(S): at 12:52

## 2021-01-20 RX ADMIN — Medication 400 MILLIGRAM(S): at 20:37

## 2021-01-20 RX ADMIN — REMDESIVIR 500 MILLIGRAM(S): 5 INJECTION INTRAVENOUS at 20:36

## 2021-01-20 RX ADMIN — Medication 5 UNIT(S): at 09:17

## 2021-01-20 RX ADMIN — FAMOTIDINE 20 MILLIGRAM(S): 10 INJECTION INTRAVENOUS at 12:53

## 2021-01-20 RX ADMIN — BENZOCAINE AND MENTHOL 1 LOZENGE: 5; 1 LIQUID ORAL at 17:25

## 2021-01-20 RX ADMIN — Medication 1 APPLICATORFUL: at 20:37

## 2021-01-20 RX ADMIN — POTASSIUM PHOSPHATE, MONOBASIC POTASSIUM PHOSPHATE, DIBASIC 83.33 MILLIMOLE(S): 236; 224 INJECTION, SOLUTION INTRAVENOUS at 11:00

## 2021-01-20 RX ADMIN — ENOXAPARIN SODIUM 40 MILLIGRAM(S): 100 INJECTION SUBCUTANEOUS at 17:24

## 2021-01-20 RX ADMIN — Medication 40 MILLIEQUIVALENT(S): at 11:14

## 2021-01-20 RX ADMIN — Medication 400 MILLIGRAM(S): at 00:09

## 2021-01-20 RX ADMIN — INSULIN GLARGINE 28 UNIT(S): 100 INJECTION, SOLUTION SUBCUTANEOUS at 21:37

## 2021-01-20 RX ADMIN — CEFTRIAXONE 100 MILLIGRAM(S): 500 INJECTION, POWDER, FOR SOLUTION INTRAMUSCULAR; INTRAVENOUS at 22:00

## 2021-01-20 RX ADMIN — SODIUM CHLORIDE 100 MILLILITER(S): 9 INJECTION INTRAMUSCULAR; INTRAVENOUS; SUBCUTANEOUS at 20:45

## 2021-01-20 RX ADMIN — Medication 2: at 21:37

## 2021-01-20 RX ADMIN — Medication 6 MILLIGRAM(S): at 04:09

## 2021-01-20 RX ADMIN — Medication 400 MILLIGRAM(S): at 12:53

## 2021-01-20 RX ADMIN — BENZOCAINE AND MENTHOL 1 LOZENGE: 5; 1 LIQUID ORAL at 04:09

## 2021-01-20 RX ADMIN — LOSARTAN POTASSIUM 25 MILLIGRAM(S): 100 TABLET, FILM COATED ORAL at 13:00

## 2021-01-20 RX ADMIN — Medication 400 MILLIGRAM(S): at 04:09

## 2021-01-20 NOTE — CONSULT NOTE ADULT - PROBLEM SELECTOR RECOMMENDATION 9
- now on decadron  - adjust Lantus to 28 units qhs  - adjust Admelog to 12 units before meals  - change correction scale to moderate correction scale qac and qhs  - consistent carb diet  - check FS qac and qhs  - will follow  - please notify endocrine service of any changes to steroid plan

## 2021-01-20 NOTE — PROGRESS NOTE ADULT - ATTENDING COMMENTS
Patient seen and examined with team  Agree with above A/p by Dr Denton  Patient is a 61 yo F with PMHx of DM, HTN, asthma, recent COVID+, presenting with worsening productive cough and SOB for 1 week, found to require supplemental O2 and have hyperglycemia to >400s, starting dexamethasone/ remdesivir day #3/5  Patient now doing well on RA  A/P   # Covid19 PNA c/w NC oxygen and Remdesivir and Dexa . Start tessalon pearls q 4 prn for cough  # Hyperglycemia C/w Lantus but increase to 205u qhs , with 8 u short acting with ,meals and ss coverage  # UTI Ceftriaxone for UTi day # 3  Endocrine consulted for Hgb A1c .  #DVT proph Lovenox sq 40 mg q12  planning for home on Friday Patient seen and examined with team  Agree with above A/p by Dr Denton  Patient is a 59 yo F with PMHx of DM, HTN, asthma, recent COVID+, presenting with worsening productive cough and SOB for 1 week, found to require supplemental O2 and have hyperglycemia to >400s, starting dexamethasone/ remdesivir day #3/5  Patient now doing well on RA  A/P   # Covid19 PNA c/w NC oxygen and Remdesivir and Dexa . Start tessalon pearls q 4 prn for cough  # Hyperglycemia C/w Lantus but increase to 25u qhs , with 8 u short acting with ,meals and ss coverage  # UTI Ceftriaxone for UTi day # 3  Endocrine consulted for Hgb A1c .  #DVT proph Lovenox sq 40 mg q12  planning for home on Friday

## 2021-01-20 NOTE — PROGRESS NOTE ADULT - PROBLEM SELECTOR PLAN 4
Patient has history of HTN. Unsure of which medications she takes.   - Monitor BP now Patient has history of HTN. Unsure of which medications she takes will keep trying for med rec.   - Monitor BP, added amlodipine and losartan for BP control.

## 2021-01-20 NOTE — CONSULT NOTE ADULT - ASSESSMENT
60 year old woman with PMH of uncontrolled DM2, HTN, HLD, asthma, recent COVID+, presenting with cough and SOB for 1 week, admitted for management of COVID, also on steroids with hyperglycemia to the 400's. Consult called for management of uncontrolled DM2 and steroid induced hyperglycemia. A1c is 12.1%. High risk patient with high level decision making, at high risk of worsening microvascular and macrovascular complications. BG goal 100-180 mg/dL.

## 2021-01-20 NOTE — PROVIDER CONTACT NOTE (OTHER) - BACKGROUND
See provider hand off for past medical history
Asthma with bronchitis, Asthma, HTN (hypertension), Type 2 DM
Patient admitted with covid.
Patient admitted with COVID

## 2021-01-20 NOTE — PROGRESS NOTE ADULT - PROBLEM SELECTOR PLAN 1
Patient with recent COVID + with increasing productive cough and SOB at rest and exertion. Resting O2 92% that drops to 91% on ambulation.   - VS stable, afebrile currently  - No leukocytosis,   - Troponin wnl   - elevated DDimer, ferritin,   - C/w dexamethasone x10d, remdesivir x3d  - albuterol as needed. tessalon perls Patient with recent COVID + with increasing productive cough and SOB at rest and exertion. Resting O2 92% that drops to 91% on ambulation.   - VS stable, afebrile currently  - No leukocytosis,   - Troponin wnl   - elevated DDimer, ferritin,   - C/w dexamethasone x10d, remdesivir x3d  - albuterol as needed. tessalon perls and robitussin PRN

## 2021-01-20 NOTE — PROVIDER CONTACT NOTE (OTHER) - ACTION/TREATMENT ORDERED:
MD notified. MD ordered a ultrasound. Geeta ordered.
MD notified. MD Ordered labs and advised give insulin as sliding scale. Ice packs provided to patient as per requested.
MD notified. No orders given just follow coverage. will update with changes.
MD aware. MD said to keep watching vitals. No orders at this time.

## 2021-01-20 NOTE — CONSULT NOTE ADULT - ATTENDING COMMENTS
Naila Green MD   Pager # [phone]  On evenings and weekends, please call the office at [phone] or page endocrine fellow on call. Please note that this patient may be followed by different provider tomorrow. If no answer, contact the office.

## 2021-01-20 NOTE — CONSULT NOTE ADULT - PROBLEM SELECTOR RECOMMENDATION 4
- if drawing labs in the AM, check lipid profile  - would benefit from moderate intensity statin such as Lipitor 20 mg qhs as long as LDL > 70 and there is no contraindication

## 2021-01-20 NOTE — CONSULT NOTE ADULT - SUBJECTIVE AND OBJECTIVE BOX
HPI:  Patient is a 60 year old woman with PMH of uncontrolled DM2, HTN, HLD, asthma, recent COVID+, presenting with cough and SOB for 1 week, admitted for management of COVID, also on steroids with hyperglycemia to the 400's. Consult called for management of uncontrolled DM2 and steroid induced hyperglycemia. A1c is 12.1%. Patient seen at bedside, states that she was diagnosed with DM2 about 5 years ago, follows with PCP, does not have an endocrinologist. Takes Novolog 70/30 (pens) at home, takes 30 units BID. Was not taking in the last week or so due to loss of appetite and not feeling well. Not sure if she can do basal bolus at home. She checks her BG 3 times a day and notes that recently, her BG was in the 200's to 300's. Prior to the last few weeks, her BG was in the 100's. She has neuropathy. No recent dilated eye exam. No nephropathy. Tolerating po here.    She denies receiving steroids here but has been receiving IV dexamethasone.     PAST MEDICAL & SURGICAL HISTORY:  Asthma with bronchitis    Asthma    HTN (hypertension)    Type 2 diabetes mellitus without complication, without long-term current use of insulin      FAMILY HISTORY:  DM2 in brother     Social History:  no cigarette use  no alcohol use    Outpatient Medications:  Novolog 70/30 pens - 30 units BID    MEDICATIONS  (STANDING):  acyclovir   Oral Tab/Cap 400 milliGRAM(s) Oral three times a day  amLODIPine   Tablet 2.5 milliGRAM(s) Oral daily  benzocaine 15 mG/menthol 3.6 mG (Sugar-Free) Lozenge 1 Lozenge Oral two times a day  cefTRIAXone   IVPB 1000 milliGRAM(s) IV Intermittent every 24 hours  cefTRIAXone   IVPB      clotrimazole 1% Vaginal Cream 1 Applicatorful Vaginal at bedtime  dexAMETHasone  Injectable 6 milliGRAM(s) IV Push daily  dextrose 40% Gel 15 Gram(s) Oral once  dextrose 50% Injectable 25 Gram(s) IV Push once  dextrose 50% Injectable 12.5 Gram(s) IV Push once  dextrose 50% Injectable 25 Gram(s) IV Push once  enoxaparin Injectable 40 milliGRAM(s) SubCutaneous every 12 hours  famotidine    Tablet 20 milliGRAM(s) Oral daily  glucagon  Injectable 1 milliGRAM(s) IntraMuscular once  insulin glargine Injectable (LANTUS) 25 Unit(s) SubCutaneous at bedtime  insulin lispro (ADMELOG) corrective regimen sliding scale   SubCutaneous three times a day before meals  insulin lispro (ADMELOG) corrective regimen sliding scale   SubCutaneous at bedtime  insulin lispro Injectable (ADMELOG) 8 Unit(s) SubCutaneous three times a day before meals  losartan 25 milliGRAM(s) Oral daily  remdesivir  IVPB 100 milliGRAM(s) IV Intermittent every 24 hours  remdesivir  IVPB   IV Intermittent   sodium chloride 0.9%. 1000 milliLiter(s) (100 mL/Hr) IV Continuous <Continuous>    MEDICATIONS  (PRN):  acetaminophen   Tablet .. 650 milliGRAM(s) Oral every 6 hours PRN Moderate Pain (4 - 6), Severe Pain (7 - 10)  ALBUTerol    90 MICROgram(s) HFA Inhaler 1 Puff(s) Inhalation every 6 hours PRN Shortness of Breath  benzonatate 100 milliGRAM(s) Oral three times a day PRN Cough  guaiFENesin   Syrup  (Sugar-Free) 100 milliGRAM(s) Oral every 6 hours PRN Cough      Allergies  No Known Allergies    Review of Systems:  Constitutional: No fever  Eyes: No blurry vision  Neuro: No tremors  HEENT: No pain  Cardiovascular: No chest pain, palpitations  Respiratory: SOB improved, no cough  GI: No nausea, vomiting, abdominal pain  : No dysuria  Skin: no rash  Endocrine: no polyuria, polydipsia    ALL OTHER SYSTEMS REVIEWED AND NEGATIVE    PHYSICAL EXAM:  VITALS: T(C): 36.7 (01-20-21 @ 04:32)  T(F): 98 (01-20-21 @ 04:32), Max: 98.9 (01-19-21 @ 21:00)  HR: 79 (01-20-21 @ 04:32) (74 - 82)  BP: 170/80 (01-20-21 @ 04:32) (136/88 - 170/80)  RR:  (20 - 20)  SpO2:  (93% - 97%)  Wt(kg): --  GENERAL: NAD, well-developed  EYES: No proptosis, anicteric  HEENT:  Atraumatic, Normocephalic  THYROID: Normal size, no palpable nodules  RESPIRATORY: Clear to auscultation bilaterally; No rales, rhonchi, wheezing  CARDIOVASCULAR: Regular rate and rhythm; No murmurs; no peripheral edema  GI: Soft, nontender, non distended, normal bowel sounds  SKIN: Dry, intact, No ulcers on feet  PSYCH: Alert and oriented x 3, reactive affect, euthymic mood    POCT Blood Glucose.: 357 mg/dL (01-20-21 @ 09:07) A 5, A 5   POCT Blood Glucose.: 402 mg/dL (01-19-21 @ 21:44) L 20, A 4   POCT Blood Glucose.: 407 mg/dL (01-19-21 @ 16:57) A 5, A 6  POCT Blood Glucose.: 386 mg/dL (01-19-21 @ 11:52) A 5, A 5  POCT Blood Glucose.: 304 mg/dL (01-19-21 @ 07:46)  POCT Blood Glucose.: 317 mg/dL (01-19-21 @ 04:19)  POCT Blood Glucose.: 305 mg/dL (01-19-21 @ 00:41)  POCT Blood Glucose.: 427 mg/dL (01-18-21 @ 20:51)  POCT Blood Glucose.: 364 mg/dL (01-18-21 @ 17:25)  POCT Blood Glucose.: 365 mg/dL (01-18-21 @ 16:19)                          11.7   9.23  )-----------( 235      ( 20 Jan 2021 07:28 )             36.2       01-20    142  |  104  |  10  ----------------------------<  339<H>  3.3<L>   |  25  |  0.43<L>    EGFR if : 128  EGFR if non : 111    Ca    8.9      01-20  Mg     1.8     01-20  Phos  2.2     01-20    TPro  7.6  /  Alb  3.8  /  TBili  0.4  /  DBili  x   /  AST  45<H>  /  ALT  34<H>  /  AlkPhos  121<H>  01-18      A1c 12.1%

## 2021-01-20 NOTE — CONSULT NOTE ADULT - PROBLEM SELECTOR RECOMMENDATION 2
- see plan above  - RD consult   - for discharge: patient unsure if she is able to do basal bolus at home, she has been instructed to notify endocrine service of her decision. At this time, plan to dc on Novolog 70/30, doses to be determined. She lives in [place], will need to find an endocrinologist closer to home

## 2021-01-20 NOTE — PROGRESS NOTE ADULT - PROBLEM SELECTOR PLAN 2
Patient found to be hyperglycemic to >400s on admission. She states she takes novolin 70/30, 30u BID  - lantus and premeal admelog. Will increase as needed for better glycemic control  - BHB 0.7, No acidosis on metabolic panel Patient found to be hyperglycemic to >400s on admission. She states she takes novolin 70/30, 30u BID  - lantus and premeal admelog. Will increase as needed for better glycemic control  - BHB 0.7, No acidosis on metabolic panel  - endocrine consult, a1c >12.

## 2021-01-20 NOTE — PROVIDER CONTACT NOTE (OTHER) - ASSESSMENT
Patient complaining of pain when coughing, on assessment patient has no rebound tenderness. Can palpate abdominal without withdraw or any facial grimace.
Patient is a&ox4. Patient complaining of abdominal pain. vital signs in flow sheet.
Patient denied any headache. Patient blood pressure was 170/80. Patient stated had a history of taking blood pressure medication but did not take it since being sick
Patient is asymptomatic. Patient has had hyperglycemia throughout the shift.

## 2021-01-20 NOTE — PROVIDER CONTACT NOTE (OTHER) - SITUATION
Patient blood glucose  427.
Patient blood glucose is 407
Patient complaining of abdominal pain
Patient had an episode of hypertension

## 2021-01-21 ENCOUNTER — TRANSCRIPTION ENCOUNTER (OUTPATIENT)
Age: 61
End: 2021-01-21

## 2021-01-21 LAB
ANION GAP SERPL CALC-SCNC: 11 MMOL/L — SIGNIFICANT CHANGE UP (ref 7–14)
BASOPHILS # BLD AUTO: 0.01 K/UL — SIGNIFICANT CHANGE UP (ref 0–0.2)
BASOPHILS NFR BLD AUTO: 0.1 % — SIGNIFICANT CHANGE UP (ref 0–2)
BUN SERPL-MCNC: 10 MG/DL — SIGNIFICANT CHANGE UP (ref 7–23)
CALCIUM SERPL-MCNC: 8.9 MG/DL — SIGNIFICANT CHANGE UP (ref 8.4–10.5)
CHLORIDE SERPL-SCNC: 105 MMOL/L — SIGNIFICANT CHANGE UP (ref 98–107)
CO2 SERPL-SCNC: 26 MMOL/L — SIGNIFICANT CHANGE UP (ref 22–31)
CREAT SERPL-MCNC: 0.36 MG/DL — LOW (ref 0.5–1.3)
EOSINOPHIL # BLD AUTO: 0.01 K/UL — SIGNIFICANT CHANGE UP (ref 0–0.5)
EOSINOPHIL NFR BLD AUTO: 0.1 % — SIGNIFICANT CHANGE UP (ref 0–6)
GLUCOSE BLDC GLUCOMTR-MCNC: 180 MG/DL — HIGH (ref 70–99)
GLUCOSE BLDC GLUCOMTR-MCNC: 345 MG/DL — HIGH (ref 70–99)
GLUCOSE BLDC GLUCOMTR-MCNC: 378 MG/DL — HIGH (ref 70–99)
GLUCOSE BLDC GLUCOMTR-MCNC: 431 MG/DL — HIGH (ref 70–99)
GLUCOSE SERPL-MCNC: 189 MG/DL — HIGH (ref 70–99)
HCT VFR BLD CALC: 35.1 % — SIGNIFICANT CHANGE UP (ref 34.5–45)
HGB BLD-MCNC: 11.2 G/DL — LOW (ref 11.5–15.5)
IANC: 7.56 K/UL — SIGNIFICANT CHANGE UP (ref 1.5–8.5)
IMM GRANULOCYTES NFR BLD AUTO: 0.4 % — SIGNIFICANT CHANGE UP (ref 0–1.5)
LYMPHOCYTES # BLD AUTO: 1.65 K/UL — SIGNIFICANT CHANGE UP (ref 1–3.3)
LYMPHOCYTES # BLD AUTO: 16.8 % — SIGNIFICANT CHANGE UP (ref 13–44)
MAGNESIUM SERPL-MCNC: 1.7 MG/DL — SIGNIFICANT CHANGE UP (ref 1.6–2.6)
MCHC RBC-ENTMCNC: 28.3 PG — SIGNIFICANT CHANGE UP (ref 27–34)
MCHC RBC-ENTMCNC: 31.9 GM/DL — LOW (ref 32–36)
MCV RBC AUTO: 88.6 FL — SIGNIFICANT CHANGE UP (ref 80–100)
MONOCYTES # BLD AUTO: 0.54 K/UL — SIGNIFICANT CHANGE UP (ref 0–0.9)
MONOCYTES NFR BLD AUTO: 5.5 % — SIGNIFICANT CHANGE UP (ref 2–14)
NEUTROPHILS # BLD AUTO: 7.56 K/UL — HIGH (ref 1.8–7.4)
NEUTROPHILS NFR BLD AUTO: 77.1 % — HIGH (ref 43–77)
NRBC # BLD: 0 /100 WBCS — SIGNIFICANT CHANGE UP
NRBC # FLD: 0 K/UL — SIGNIFICANT CHANGE UP
PHOSPHATE SERPL-MCNC: 3.6 MG/DL — SIGNIFICANT CHANGE UP (ref 2.5–4.5)
PLATELET # BLD AUTO: 251 K/UL — SIGNIFICANT CHANGE UP (ref 150–400)
POTASSIUM SERPL-MCNC: 3.3 MMOL/L — LOW (ref 3.5–5.3)
POTASSIUM SERPL-SCNC: 3.3 MMOL/L — LOW (ref 3.5–5.3)
RBC # BLD: 3.96 M/UL — SIGNIFICANT CHANGE UP (ref 3.8–5.2)
RBC # FLD: 12.8 % — SIGNIFICANT CHANGE UP (ref 10.3–14.5)
SODIUM SERPL-SCNC: 142 MMOL/L — SIGNIFICANT CHANGE UP (ref 135–145)
WBC # BLD: 9.81 K/UL — SIGNIFICANT CHANGE UP (ref 3.8–10.5)
WBC # FLD AUTO: 9.81 K/UL — SIGNIFICANT CHANGE UP (ref 3.8–10.5)

## 2021-01-21 PROCEDURE — 99232 SBSQ HOSP IP/OBS MODERATE 35: CPT | Mod: GC

## 2021-01-21 PROCEDURE — 99232 SBSQ HOSP IP/OBS MODERATE 35: CPT

## 2021-01-21 RX ORDER — POTASSIUM CHLORIDE 20 MEQ
40 PACKET (EA) ORAL ONCE
Refills: 0 | Status: DISCONTINUED | OUTPATIENT
Start: 2021-01-21 | End: 2021-01-21

## 2021-01-21 RX ORDER — POTASSIUM CHLORIDE 20 MEQ
40 PACKET (EA) ORAL ONCE
Refills: 0 | Status: COMPLETED | OUTPATIENT
Start: 2021-01-21 | End: 2021-01-21

## 2021-01-21 RX ORDER — INSULIN LISPRO 100/ML
18 VIAL (ML) SUBCUTANEOUS
Refills: 0 | Status: DISCONTINUED | OUTPATIENT
Start: 2021-01-22 | End: 2021-01-22

## 2021-01-21 RX ORDER — ATORVASTATIN CALCIUM 80 MG/1
20 TABLET, FILM COATED ORAL AT BEDTIME
Refills: 0 | Status: DISCONTINUED | OUTPATIENT
Start: 2021-01-21 | End: 2021-01-21

## 2021-01-21 RX ORDER — INSULIN LISPRO 100/ML
18 VIAL (ML) SUBCUTANEOUS
Refills: 0 | Status: DISCONTINUED | OUTPATIENT
Start: 2021-01-21 | End: 2021-01-22

## 2021-01-21 RX ADMIN — Medication 400 MILLIGRAM(S): at 13:12

## 2021-01-21 RX ADMIN — Medication 400 MILLIGRAM(S): at 05:30

## 2021-01-21 RX ADMIN — REMDESIVIR 500 MILLIGRAM(S): 5 INJECTION INTRAVENOUS at 19:19

## 2021-01-21 RX ADMIN — Medication 1 APPLICATORFUL: at 22:40

## 2021-01-21 RX ADMIN — Medication 40 MILLIEQUIVALENT(S): at 17:57

## 2021-01-21 RX ADMIN — SODIUM CHLORIDE 100 MILLILITER(S): 9 INJECTION INTRAMUSCULAR; INTRAVENOUS; SUBCUTANEOUS at 20:43

## 2021-01-21 RX ADMIN — INSULIN GLARGINE 28 UNIT(S): 100 INJECTION, SOLUTION SUBCUTANEOUS at 23:13

## 2021-01-21 RX ADMIN — Medication 8: at 13:13

## 2021-01-21 RX ADMIN — Medication 12 UNIT(S): at 13:15

## 2021-01-21 RX ADMIN — Medication 6: at 23:12

## 2021-01-21 RX ADMIN — Medication 100 MILLIGRAM(S): at 05:28

## 2021-01-21 RX ADMIN — ENOXAPARIN SODIUM 40 MILLIGRAM(S): 100 INJECTION SUBCUTANEOUS at 20:05

## 2021-01-21 RX ADMIN — Medication 2: at 09:43

## 2021-01-21 RX ADMIN — BENZOCAINE AND MENTHOL 1 LOZENGE: 5; 1 LIQUID ORAL at 20:35

## 2021-01-21 RX ADMIN — LOSARTAN POTASSIUM 25 MILLIGRAM(S): 100 TABLET, FILM COATED ORAL at 05:30

## 2021-01-21 RX ADMIN — CEFTRIAXONE 100 MILLIGRAM(S): 500 INJECTION, POWDER, FOR SOLUTION INTRAMUSCULAR; INTRAVENOUS at 21:21

## 2021-01-21 RX ADMIN — Medication 6 MILLIGRAM(S): at 05:29

## 2021-01-21 RX ADMIN — Medication 8: at 18:06

## 2021-01-21 RX ADMIN — AMLODIPINE BESYLATE 2.5 MILLIGRAM(S): 2.5 TABLET ORAL at 05:30

## 2021-01-21 RX ADMIN — BENZOCAINE AND MENTHOL 1 LOZENGE: 5; 1 LIQUID ORAL at 05:30

## 2021-01-21 RX ADMIN — Medication 400 MILLIGRAM(S): at 21:21

## 2021-01-21 RX ADMIN — Medication 18 UNIT(S): at 18:08

## 2021-01-21 RX ADMIN — ENOXAPARIN SODIUM 40 MILLIGRAM(S): 100 INJECTION SUBCUTANEOUS at 17:59

## 2021-01-21 RX ADMIN — ENOXAPARIN SODIUM 40 MILLIGRAM(S): 100 INJECTION SUBCUTANEOUS at 05:29

## 2021-01-21 RX ADMIN — Medication 12 UNIT(S): at 09:45

## 2021-01-21 NOTE — PROGRESS NOTE ADULT - PROBLEM SELECTOR PLAN 2
Patient found to be hyperglycemic to >400s on admission. She states she takes novolin 70/30, 30u BID  - lantus and premeal admelog. Will increase as needed for better glycemic control  - BHB 0.7, No acidosis on metabolic panel  - endocrine consult, a1c >12.

## 2021-01-21 NOTE — PROGRESS NOTE ADULT - ATTENDING COMMENTS
Patient seen and examined with team  Agree with above A/p by Dr Denton  Patient is a 59 yo F with PMHx of DM, HTN, asthma, recent COVID+, presenting with worsening productive cough and SOB for 1 week, found to require supplemental O2 and have hyperglycemia to >400s, starting dexamethasone/ remdesivir day #3/5  Patient now doing well on RA  A/P   # Covid19 PNA c/w NC oxygen and Remdesivir day 4/5,  and Dexa .   c/w tessalon pearls q 4 prn for cough  # Hyperglycemia C/w Lantus - Endocrine greatly appreciated- out patient f/u with Endocrine / PCP  # UTI Ceftriaxone for UTi day # 4 - can stop ceftriaxone  #DVT proph Lovenox sq 40 mg q12  planning for home on Friday

## 2021-01-21 NOTE — DISCHARGE NOTE PROVIDER - NSDCMRMEDTOKEN_GEN_ALL_CORE_FT
acyclovir 400 mg oral tablet: 1 tab(s) orally 3 times a day  Keflex 500 mg oral capsule: 1 cap(s) orally every 12 hours  NovoLIN 70/30 subcutaneous suspension: 30 unit(s) subcutaneous 2 times a day   acyclovir 400 mg oral tablet: 1 tab(s) orally 3 times a day  alcohol swabs : Apply topically to affected area 4 times a day   amLODIPine 2.5 mg oral tablet: 1 tab(s) orally once a day  atorvastatin 20 mg oral tablet: 1 tab(s) orally once a day (at bedtime)  glucometer (per patient&#x27;s insurance): Test blood sugars four times a day. Dispense #1 glucometer.  glucose tablets: Follow instructions on bottle when sugar is low.  Keflex 500 mg oral capsule: 1 cap(s) orally every 12 hours  lancets: 1 application subcutaneously 4 times a day   losartan 25 mg oral tablet: 1 tab(s) orally once a day  NovoLIN 70/30 FlexPen subcutaneous suspension: 1/22-1/24 : 60 units before breakfast and 40 units before dinner x 2 days     01/25- 40 units before breakfast and 30 units before dinner     test strips (per patient&#x27;s insurance): 1 application subcutaneously 4 times a day. ** Compatible with patient&#x27;s glucometer **

## 2021-01-21 NOTE — DISCHARGE NOTE PROVIDER - NSFOLLOWUPCLINICS_GEN_ALL_ED_FT
[facility] Endocrinology  Endocrinology  [address] [place] [address]  Phone: [phone]  Fax:   Follow Up Time: 1 week    [facility] General Internal Medicine  General Internal Medicine  [address] [place] [id number]  Phone: [phone]  Fax:   Follow Up Time: 1 week

## 2021-01-21 NOTE — PROGRESS NOTE ADULT - PROBLEM SELECTOR PLAN 1
- now on decadron  - c/w Lantus 28 units qhs  - adjust Admelog to 18 units before meals  - c/w moderate correction scale qac and qhs  - consistent carb diet  - check FS qac and qhs  - will follow  - please notify endocrine service of any changes to steroid plan.

## 2021-01-21 NOTE — PROGRESS NOTE ADULT - ATTENDING COMMENTS
Naila Green MD   Pager # [phone]  On evenings and weekends, please call the office at [phone] or page endocrine fellow on call. Please note that this patient may be followed by different provider tomorrow. If no answer, contact the office.

## 2021-01-21 NOTE — PROGRESS NOTE ADULT - PROBLEM SELECTOR PLAN 4
Patient has history of HTN. Unsure of which medications she takes will keep trying for med rec.   - Monitor BP, added amlodipine and losartan for BP control.

## 2021-01-21 NOTE — PROGRESS NOTE ADULT - PROBLEM SELECTOR PLAN 1
Patient with recent COVID + with increasing productive cough and SOB at rest and exertion. Resting O2 92% that drops to 91% on ambulation. Currently RA 94%+  - VS stable, afebrile currently  - No leukocytosis,   - Troponin wnl   - elevated DDimer, ferritin,   - C/w dexamethasone, remdesivir  - albuterol as needed. tessalon perls and robitussin PRN

## 2021-01-21 NOTE — DISCHARGE NOTE PROVIDER - NSDCCPCAREPLAN_GEN_ALL_CORE_FT
PRINCIPAL DISCHARGE DIAGNOSIS  Diagnosis: COVID-19  Assessment and Plan of Treatment: You came into the hospital and were found to be COVID positive. Your oxygenation was above 92% on room air and had blood work that was stable. We provided a short course of remdesivir and dexamethasone. We recommend that once discharged you have an appointment with a primary care provider within 1 week of discharge (even if via telemedicine). If you have worsening difficulty breathing, pain, or dizziness, please return to the hospital.      SECONDARY DISCHARGE DIAGNOSES  Diagnosis: Uncontrolled type 2 diabetes mellitus with hyperglycemia  Assessment and Plan of Treatment: You came into the hospital and were found to have blood sugar higher than 400s. We increased your requirement of insulin while you were admitted especially as you were on dexamethasone (steroid). Since the steroid lasts in your body for ~2-3 days, we recommend that on 1/23 and 1/24 (Saturday, Sunday) you take your novolin 70/30 at a dose of 60 units before breakfast and 40 units before dinner. On Monday (1/25) and going forward, please take 40 units before breakfast and 30 units before dinner. It is very important that you follow up with your primary care doctor and endocrinologist within 1 week of discharge (even if via telemedicine). We will provide additional provider in the paperwork if you choose to visit them instead. If you have worsening symptoms, please return to the hospital.    Diagnosis: Essential hypertension  Assessment and Plan of Treatment: You have a history of high blood pressure and you could not recall the specific medications and that particular outside pharmacy. Instead we started you on amlodipine and losartan. We will provide a prescription for both medications *if* you don't have your other blood pressure medications. Please do not take the new blood pressure medications (amlodipine, losartan) if you will take your previous blood pressure medications. Please follow up with your primary care provider or the clinic included within 1 week for follow up.

## 2021-01-21 NOTE — PROGRESS NOTE ADULT - PROBLEM SELECTOR PLAN 2
- see plan above  - RD consult   - for discharge: patient unsure if she is able to do basal bolus at home, she has been instructed to notify endocrine service of her decision. At this time, plan to dc on Novolog 70/30, doses to be determined. She lives in [place], will need to find an endocrinologist closer to home.

## 2021-01-21 NOTE — PROGRESS NOTE ADULT - PROBLEM SELECTOR PLAN 4
- check lipid panel as either inpatient or outpatient   - would benefit from moderate intensity statin such as Lipitor 20 mg qhs as long as LDL > 70 and there is no contraindication.

## 2021-01-21 NOTE — DISCHARGE NOTE PROVIDER - HOSPITAL COURSE
Patient is a 59 yo F with PMHx of DM, HTN, asthma, recent COVID+, presenting with worsening productive cough and SOB for 1 week, found to require supplemental O2 and have hyperglycemia to >400s, starting  In ED, resting O2 92% that dropped to 91% on ambulation. Intermittently required supplemental O2 with nasal cannula. Satting >95%. VS stable and patient remained afebr  - VS stable, afebrile currently  - No leukocytosis,   - Troponin wnl   - elevated DDimer, ferritin,   - C/w dexamethasone x10d, remdesivir x3d  - albuterol as needed. tessalon perls.    dexamethasone/ remdesivir.     Endocrine    Patient is a 61 yo F with PMHx of DM, HTN, asthma, recent COVID+, presenting with worsening productive cough and SOB for 1 week, found to require supplemental O2 and have hyperglycemia to >400s, starting  In ED, resting O2 92% that dropped to 91% on ambulation. Intermittently required supplemental O2 with nasal cannula. Satting >95%. VS stable and patient remained afebrile and without leukocytosis. Troponin wnl. Elevated DDimer 272, ferritin 658, procal 0.11, pro BNP wnl, UA with glucosuria, RVP neg except for COVID pos. Given abd pain, RUQ us showed hepatic steatosis. Started patient on dexamethasone and remdesivir with albuterol, robitussin, tessalon pearls. Endocrine was consulted as A1c >12 and patient had frequent FS >400s in setting of steroids for COVID. BHB mildly elevated but no signs of acidosis. Lantus was increased to 34 at bedtime and 25 units premeal. Given patient saturating well on RA with stable labs and vitals, patient was medically stable to discharge with close follow up. Recommended given that patient was on dexamethasone to take novolin 70/30 at 60 units before breakfast / 40 units before dinner on Saturday and Sunday. Then switch to 40 units before breakfast / 30 units before dinner on Monday and thereafter. Discussed with attending and endocrinology teams.     Of note patient during cours eof hospitalization was not able to produce the name of the 2nd pharmacy where she gets her hypertensive medications. Will recommend to her that she continue her previous regimen and have close follow up with her PCP or our internal medicine offices.

## 2021-01-22 ENCOUNTER — TRANSCRIPTION ENCOUNTER (OUTPATIENT)
Age: 61
End: 2021-01-22

## 2021-01-22 VITALS
OXYGEN SATURATION: 95 % | RESPIRATION RATE: 20 BRPM | HEART RATE: 90 BPM | DIASTOLIC BLOOD PRESSURE: 90 MMHG | SYSTOLIC BLOOD PRESSURE: 162 MMHG | TEMPERATURE: 99 F

## 2021-01-22 LAB
ANION GAP SERPL CALC-SCNC: 13 MMOL/L — SIGNIFICANT CHANGE UP (ref 7–14)
BUN SERPL-MCNC: 9 MG/DL — SIGNIFICANT CHANGE UP (ref 7–23)
CALCIUM SERPL-MCNC: 9.1 MG/DL — SIGNIFICANT CHANGE UP (ref 8.4–10.5)
CHLORIDE SERPL-SCNC: 103 MMOL/L — SIGNIFICANT CHANGE UP (ref 98–107)
CHOLEST SERPL-MCNC: 131 MG/DL — SIGNIFICANT CHANGE UP
CO2 SERPL-SCNC: 25 MMOL/L — SIGNIFICANT CHANGE UP (ref 22–31)
CREAT SERPL-MCNC: 0.37 MG/DL — LOW (ref 0.5–1.3)
GLUCOSE BLDC GLUCOMTR-MCNC: 260 MG/DL — HIGH (ref 70–99)
GLUCOSE BLDC GLUCOMTR-MCNC: 331 MG/DL — HIGH (ref 70–99)
GLUCOSE BLDC GLUCOMTR-MCNC: 380 MG/DL — HIGH (ref 70–99)
GLUCOSE SERPL-MCNC: 245 MG/DL — HIGH (ref 70–99)
HCT VFR BLD CALC: 37.3 % — SIGNIFICANT CHANGE UP (ref 34.5–45)
HDLC SERPL-MCNC: 40 MG/DL — LOW
HGB BLD-MCNC: 11.9 G/DL — SIGNIFICANT CHANGE UP (ref 11.5–15.5)
LIPID PNL WITH DIRECT LDL SERPL: 73 MG/DL — SIGNIFICANT CHANGE UP
MAGNESIUM SERPL-MCNC: 1.8 MG/DL — SIGNIFICANT CHANGE UP (ref 1.6–2.6)
MCHC RBC-ENTMCNC: 28.1 PG — SIGNIFICANT CHANGE UP (ref 27–34)
MCHC RBC-ENTMCNC: 31.9 GM/DL — LOW (ref 32–36)
MCV RBC AUTO: 88.2 FL — SIGNIFICANT CHANGE UP (ref 80–100)
NON HDL CHOLESTEROL: 91 MG/DL — SIGNIFICANT CHANGE UP
NRBC # BLD: 0 /100 WBCS — SIGNIFICANT CHANGE UP
NRBC # FLD: 0 K/UL — SIGNIFICANT CHANGE UP
PHOSPHATE SERPL-MCNC: 2.7 MG/DL — SIGNIFICANT CHANGE UP (ref 2.5–4.5)
PLATELET # BLD AUTO: 282 K/UL — SIGNIFICANT CHANGE UP (ref 150–400)
POTASSIUM SERPL-MCNC: 3.3 MMOL/L — LOW (ref 3.5–5.3)
POTASSIUM SERPL-SCNC: 3.3 MMOL/L — LOW (ref 3.5–5.3)
RBC # BLD: 4.23 M/UL — SIGNIFICANT CHANGE UP (ref 3.8–5.2)
RBC # FLD: 12.9 % — SIGNIFICANT CHANGE UP (ref 10.3–14.5)
SODIUM SERPL-SCNC: 141 MMOL/L — SIGNIFICANT CHANGE UP (ref 135–145)
TRIGL SERPL-MCNC: 91 MG/DL — SIGNIFICANT CHANGE UP
WBC # BLD: 8.12 K/UL — SIGNIFICANT CHANGE UP (ref 3.8–10.5)
WBC # FLD AUTO: 8.12 K/UL — SIGNIFICANT CHANGE UP (ref 3.8–10.5)

## 2021-01-22 PROCEDURE — 99232 SBSQ HOSP IP/OBS MODERATE 35: CPT

## 2021-01-22 PROCEDURE — 99239 HOSP IP/OBS DSCHRG MGMT >30: CPT

## 2021-01-22 RX ORDER — INSULIN NPH HUM/REG INSULIN HM 70-30/ML
40 VIAL (ML) SUBCUTANEOUS
Qty: 30 | Refills: 0
Start: 2021-01-22 | End: 2021-02-20

## 2021-01-22 RX ORDER — INSULIN GLARGINE 100 [IU]/ML
34 INJECTION, SOLUTION SUBCUTANEOUS AT BEDTIME
Refills: 0 | Status: DISCONTINUED | OUTPATIENT
Start: 2021-01-22 | End: 2021-01-22

## 2021-01-22 RX ORDER — LOSARTAN POTASSIUM 100 MG/1
1 TABLET, FILM COATED ORAL
Qty: 30 | Refills: 0
Start: 2021-01-22 | End: 2021-02-20

## 2021-01-22 RX ORDER — ATORVASTATIN CALCIUM 80 MG/1
1 TABLET, FILM COATED ORAL
Qty: 30 | Refills: 0
Start: 2021-01-22 | End: 2021-02-20

## 2021-01-22 RX ORDER — IPRATROPIUM/ALBUTEROL SULFATE 18-103MCG
1 AEROSOL WITH ADAPTER (GRAM) INHALATION
Refills: 0 | Status: DISCONTINUED | OUTPATIENT
Start: 2021-01-22 | End: 2021-01-22

## 2021-01-22 RX ORDER — ISOPROPYL ALCOHOL, BENZOCAINE .7; .06 ML/ML; ML/ML
1 SWAB TOPICAL
Qty: 100 | Refills: 1
Start: 2021-01-22 | End: 2021-03-12

## 2021-01-22 RX ORDER — POTASSIUM CHLORIDE 20 MEQ
40 PACKET (EA) ORAL ONCE
Refills: 0 | Status: COMPLETED | OUTPATIENT
Start: 2021-01-22 | End: 2021-01-22

## 2021-01-22 RX ORDER — INSULIN NPH HUM/REG INSULIN HM 70-30/ML
30 VIAL (ML) SUBCUTANEOUS
Qty: 0 | Refills: 0 | DISCHARGE

## 2021-01-22 RX ORDER — INSULIN LISPRO 100/ML
25 VIAL (ML) SUBCUTANEOUS
Refills: 0 | Status: DISCONTINUED | OUTPATIENT
Start: 2021-01-22 | End: 2021-01-22

## 2021-01-22 RX ORDER — ATORVASTATIN CALCIUM 80 MG/1
20 TABLET, FILM COATED ORAL AT BEDTIME
Refills: 0 | Status: DISCONTINUED | OUTPATIENT
Start: 2021-01-22 | End: 2021-01-22

## 2021-01-22 RX ADMIN — Medication 40 MILLIEQUIVALENT(S): at 12:04

## 2021-01-22 RX ADMIN — Medication 10: at 12:02

## 2021-01-22 RX ADMIN — Medication 18 UNIT(S): at 09:14

## 2021-01-22 RX ADMIN — Medication 8: at 18:00

## 2021-01-22 RX ADMIN — Medication 6 MILLIGRAM(S): at 05:29

## 2021-01-22 RX ADMIN — AMLODIPINE BESYLATE 2.5 MILLIGRAM(S): 2.5 TABLET ORAL at 05:29

## 2021-01-22 RX ADMIN — Medication 25 UNIT(S): at 12:14

## 2021-01-22 RX ADMIN — Medication 25 UNIT(S): at 18:00

## 2021-01-22 RX ADMIN — LOSARTAN POTASSIUM 25 MILLIGRAM(S): 100 TABLET, FILM COATED ORAL at 05:29

## 2021-01-22 RX ADMIN — ENOXAPARIN SODIUM 40 MILLIGRAM(S): 100 INJECTION SUBCUTANEOUS at 17:13

## 2021-01-22 RX ADMIN — Medication 400 MILLIGRAM(S): at 05:29

## 2021-01-22 RX ADMIN — Medication 1 PUFF(S): at 18:05

## 2021-01-22 RX ADMIN — Medication 400 MILLIGRAM(S): at 12:05

## 2021-01-22 RX ADMIN — SODIUM CHLORIDE 100 MILLILITER(S): 9 INJECTION INTRAMUSCULAR; INTRAVENOUS; SUBCUTANEOUS at 09:14

## 2021-01-22 RX ADMIN — REMDESIVIR 500 MILLIGRAM(S): 5 INJECTION INTRAVENOUS at 17:12

## 2021-01-22 RX ADMIN — BENZOCAINE AND MENTHOL 1 LOZENGE: 5; 1 LIQUID ORAL at 17:13

## 2021-01-22 RX ADMIN — FAMOTIDINE 20 MILLIGRAM(S): 10 INJECTION INTRAVENOUS at 12:05

## 2021-01-22 RX ADMIN — Medication 100 MILLIGRAM(S): at 12:05

## 2021-01-22 RX ADMIN — Medication 200 MILLIGRAM(S): at 12:05

## 2021-01-22 RX ADMIN — BENZOCAINE AND MENTHOL 1 LOZENGE: 5; 1 LIQUID ORAL at 05:29

## 2021-01-22 RX ADMIN — Medication 6: at 09:15

## 2021-01-22 RX ADMIN — ENOXAPARIN SODIUM 40 MILLIGRAM(S): 100 INJECTION SUBCUTANEOUS at 05:29

## 2021-01-22 NOTE — PROGRESS NOTE ADULT - PROBLEM SELECTOR PLAN 1
- now on decadron  - increase Lantus to 34 units qhs  - adjust Admelog to 25 units before meals  - c/w moderate correction scale qac and qhs  - consistent carb diet  - check FS qac and qhs  - will follow  - please notify endocrine service of any changes to steroid plan.

## 2021-01-22 NOTE — PROGRESS NOTE ADULT - PROBLEM SELECTOR PROBLEM 2
Type 2 diabetes mellitus without complication, without long-term current use of insulin
Type 2 diabetes mellitus without complication, without long-term current use of insulin
Uncontrolled type 2 diabetes mellitus with hyperglycemia
Uncontrolled type 2 diabetes mellitus with hyperglycemia
Type 2 diabetes mellitus without complication, without long-term current use of insulin
Type 2 diabetes mellitus without complication, without long-term current use of insulin

## 2021-01-22 NOTE — DIETITIAN INITIAL EVALUATION ADULT. - OTHER INFO
Unable to conduct a face to face interview or nutrition-focused physical exam due to limited contact restrictions related to Pt's medical condition and isolation precautions. Collateral information was obtained from chart review. Unable to assess PO intake although it is noted in flow sheet that nutrition is excellent. No GI distress (nausea/vomiting/diarrhea/constipation) noted as per chart. No chewing or swallowing difficulties at this time as per chart. Unable to assess wt history.   Pt has a history of diabetes and is on insulin at home. Pt did not take insulin for about 1 week due to lack of appetite and not feeling well. Last 2 to 3 week Pt's FS were 200 to 300. Before this Pt's FS are in 100s. Pt is tolerating po intake well.

## 2021-01-22 NOTE — PROGRESS NOTE ADULT - PROBLEM SELECTOR PLAN 2
Patient found to be hyperglycemic to >400s on admission. She states she takes novolin 70/30, 30u BID  - lantus and premeal admelog. Will increase as needed for better glycemic control  - BHB 0.7, No acidosis on metabolic panel  - endocrine consult, a1c >12.  - LDL>70. Will start low dose lipitor.

## 2021-01-22 NOTE — PROGRESS NOTE ADULT - ATTENDING COMMENTS
Naila Green MD   Pager # [phone]  On evenings and weekends, please call the office at [phone] or page endocrine fellow on call. Please note that this patient may be followed by different provider tomorrow. If no answer, contact the office.

## 2021-01-22 NOTE — PROGRESS NOTE ADULT - PROBLEM SELECTOR PLAN 5
Anticoagulation: lovenox  Diet: dash tlc/ cc  Code: Full

## 2021-01-22 NOTE — PROGRESS NOTE ADULT - PROBLEM SELECTOR PROBLEM 4
Hyperlipidemia, unspecified hyperlipidemia type
HTN (hypertension)
Hyperlipidemia, unspecified hyperlipidemia type
HTN (hypertension)

## 2021-01-22 NOTE — PROGRESS NOTE ADULT - SUBJECTIVE AND OBJECTIVE BOX
PROGRESS NOTE:     CONTACT INFO:  Johnathan Denton MD PGY-1  Internal Medicine  [facility]: [phone]  [facility] [address]  If no response, please check Resident assignment   section of [place] for most up-to-date contact info  After 7PM, please page night float    Patient is a 60y old  Female who presents with a chief complaint of COVID and hyperglycemia (20 Jan 2021 11:52)    SUBJECTIVE / OVERNIGHT EVENTS: Patient seen and evaluated at bedside. States breathing is stable and still has productive cough. No new complaints. Denies headaches, fever, worsening SOB at rest, chest pain, abdominal pain.    MEDICATIONS  (STANDING):  acyclovir   Oral Tab/Cap 400 milliGRAM(s) Oral three times a day  amLODIPine   Tablet 2.5 milliGRAM(s) Oral daily  benzocaine 15 mG/menthol 3.6 mG (Sugar-Free) Lozenge 1 Lozenge Oral two times a day  cefTRIAXone   IVPB 1000 milliGRAM(s) IV Intermittent every 24 hours  cefTRIAXone   IVPB      clotrimazole 1% Vaginal Cream 1 Applicatorful Vaginal at bedtime  dexAMETHasone  Injectable 6 milliGRAM(s) IV Push daily  dextrose 40% Gel 15 Gram(s) Oral once  dextrose 50% Injectable 25 Gram(s) IV Push once  dextrose 50% Injectable 12.5 Gram(s) IV Push once  dextrose 50% Injectable 25 Gram(s) IV Push once  enoxaparin Injectable 40 milliGRAM(s) SubCutaneous every 12 hours  famotidine    Tablet 20 milliGRAM(s) Oral daily  glucagon  Injectable 1 milliGRAM(s) IntraMuscular once  insulin glargine Injectable (LANTUS) 28 Unit(s) SubCutaneous at bedtime  insulin lispro (ADMELOG) corrective regimen sliding scale   SubCutaneous three times a day before meals  insulin lispro (ADMELOG) corrective regimen sliding scale   SubCutaneous at bedtime  insulin lispro Injectable (ADMELOG) 12 Unit(s) SubCutaneous three times a day before meals  losartan 25 milliGRAM(s) Oral daily  remdesivir  IVPB 100 milliGRAM(s) IV Intermittent every 24 hours  remdesivir  IVPB   IV Intermittent   sodium chloride 0.9%. 1000 milliLiter(s) (100 mL/Hr) IV Continuous <Continuous>    MEDICATIONS  (PRN):  acetaminophen   Tablet .. 650 milliGRAM(s) Oral every 6 hours PRN Moderate Pain (4 - 6), Severe Pain (7 - 10)  ALBUTerol    90 MICROgram(s) HFA Inhaler 1 Puff(s) Inhalation every 6 hours PRN Shortness of Breath  benzonatate 100 milliGRAM(s) Oral three times a day PRN Cough  guaiFENesin   Syrup  (Sugar-Free) 100 milliGRAM(s) Oral every 6 hours PRN Cough    CAPILLARY BLOOD GLUCOSE    POCT Blood Glucose.: 180 mg/dL (21 Jan 2021 07:01)  POCT Blood Glucose.: 252 mg/dL (20 Jan 2021 21:28)  POCT Blood Glucose.: 417 mg/dL (20 Jan 2021 17:24)  POCT Blood Glucose.: 430 mg/dL (20 Jan 2021 12:20)    I&O's Summary    20 Jan 2021 07:01  -  21 Jan 2021 07:00  --------------------------------------------------------  IN: 800 mL / OUT: 0 mL / NET: 800 mL    PHYSICAL EXAM:  Vital Signs Last 24 Hrs  T(C): 36.8 (21 Jan 2021 05:24), Max: 37 (20 Jan 2021 20:31)  T(F): 98.2 (21 Jan 2021 05:24), Max: 98.6 (20 Jan 2021 20:31)  HR: 63 (21 Jan 2021 05:24) (63 - 69)  BP: 134/66 (21 Jan 2021 05:24) (134/66 - 144/78)  BP(mean): --  RR: 18 (21 Jan 2021 05:24) (18 - 18)  SpO2: 94% (21 Jan 2021 05:24) (94% - 96%)    PHYSICAL EXAM: limited due to reducing COVID exposure  GENERAL: more calm than yesterday but still frustrated with pain, lying in bed with difficulty with wet cough  HEAD:  Atraumatic, Normocephalic  EYES: EOMI, conjunctiva and sclera clear  ENT: Moist mucous membranes  NECK: Supple,   CHEST/LUNG: Shallow breathing and limited 2/2 body habitus. Unlabored respirations.  HEART: Limited 2/2 body habitus.  EXTREMITIES:   No worsening edema  NERVOUS SYSTEM:  Alert & Oriented X3, speech clear. No deficits   MSK: FROM all 4 extremities    LABS:                        11.2   9.81  )-----------( 251      ( 21 Jan 2021 07:38 )             35.1     01-21    142  |  105  |  10  ----------------------------<  189<H>  3.3<L>   |  26  |  0.36<L>    Ca    8.9      21 Jan 2021 07:38  Phos  3.6     01-21  Mg     1.7     01-21    RADIOLOGY & ADDITIONAL TESTS:      
PROGRESS NOTE:     CONTACT INFO:  Johnathan Denton MD PGY-1  Internal Medicine  [facility]: [phone]  [facility] [address]  If no response, please check Resident assignment   section of [place] for most up-to-date contact info  After 7PM, please page night float    Patient is a 60y old  Female who presents with a chief complaint of COVID and hyperglycemia (2021 15:20)    SUBJECTIVE / OVERNIGHT EVENTS: Patient seen and evaluated at bedside. States she has had worse cough overnight and wanted anticongestants. Also more emotional today morning. Overnight had elevated FS >400 requiring pushes of insulin. Will adjust basal bolus today. Has fevers, chills, SOB with abd pain from constant coughing. Denies nausea, vomiting, chest pain, palpitations, acute onset focal weakness or sensory changes.      MEDICATIONS  (STANDING):  benzocaine 15 mG/menthol 3.6 mG (Sugar-Free) Lozenge 1 Lozenge Oral two times a day  cefTRIAXone   IVPB 1000 milliGRAM(s) IV Intermittent every 24 hours  cefTRIAXone   IVPB      dexAMETHasone  Injectable 6 milliGRAM(s) IV Push daily  dextrose 40% Gel 15 Gram(s) Oral once  dextrose 50% Injectable 25 Gram(s) IV Push once  dextrose 50% Injectable 12.5 Gram(s) IV Push once  dextrose 50% Injectable 25 Gram(s) IV Push once  enoxaparin Injectable 40 milliGRAM(s) SubCutaneous every 12 hours  famotidine    Tablet 20 milliGRAM(s) Oral daily  glucagon  Injectable 1 milliGRAM(s) IntraMuscular once  insulin glargine Injectable (LANTUS) 10 Unit(s) SubCutaneous at bedtime  insulin lispro (ADMELOG) corrective regimen sliding scale   SubCutaneous three times a day before meals  insulin lispro (ADMELOG) corrective regimen sliding scale   SubCutaneous at bedtime  insulin lispro Injectable (ADMELOG) 2 Unit(s) SubCutaneous three times a day before meals  remdesivir  IVPB 100 milliGRAM(s) IV Intermittent every 24 hours  remdesivir  IVPB   IV Intermittent   sodium chloride 0.9%. 1000 milliLiter(s) (100 mL/Hr) IV Continuous <Continuous>    MEDICATIONS  (PRN):  ALBUTerol    90 MICROgram(s) HFA Inhaler 1 Puff(s) Inhalation every 6 hours PRN Shortness of Breath  benzonatate 100 milliGRAM(s) Oral three times a day PRN Cough      CAPILLARY BLOOD GLUCOSE      POCT Blood Glucose.: 304 mg/dL (2021 07:46)  POCT Blood Glucose.: 317 mg/dL (2021 04:19)  POCT Blood Glucose.: 305 mg/dL (2021 00:41)  POCT Blood Glucose.: 427 mg/dL (2021 20:51)  POCT Blood Glucose.: 364 mg/dL (2021 17:25)  POCT Blood Glucose.: 365 mg/dL (2021 16:19)    I&O's Summary      PHYSICAL EXAM:  Vital Signs Last 24 Hrs  T(C): 36.8 (2021 04:47), Max: 37.1 (2021 20:57)  T(F): 98.2 (2021 04:47), Max: 98.7 (2021 20:57)  HR: 80 (2021 04:47) (70 - 103)  BP: 136/89 (2021 04:47) (105/60 - 146/84)  BP(mean): --  RR: 20 (2021 04:47) (18 - 20)  SpO2: 96% (2021 04:47) (91% - 99%)     PHYSICAL EXAM:  GENERAL: NAD, lying in bed with difficulty with wet cough  HEAD:  Atraumatic, Normocephalic  EYES: EOMI, conjunctiva and sclera clear  ENT: Moist mucous membranes  NECK: Supple,   CHEST/LUNG: Shallow breathing and limited 2/2 body habitus, mild crackles. Unlabored respirations.  HEART: Limited 2/2 body habitus, grossly regular rate and rhythm; No murmurs, rubs, or gallops  ABDOMEN: Tenderness to epigastric region/ upper abdomen.  EXTREMITIES:   No clubbing, cyanosis, or edema  NERVOUS SYSTEM:  Alert & Oriented X3, speech clear. No deficits   MSK: FROM all 4 extremities, full and equal strength    LABS:                        12.0   4.96  )-----------( 213      ( 2021 07:14 )             36.6     -    140  |  101  |  8   ----------------------------<  327<H>  3.8   |  26  |  0.41<L>    Ca    9.4      2021 07:14  Phos  2.5       Mg     2.0         TPro  7.6  /  Alb  3.8  /  TBili  0.4  /  DBili  x   /  AST  45<H>  /  ALT  34<H>  /  AlkPhos  121<H>      PT/INR - ( 2021 13:13 )   PT: 13.2 sec;   INR: 1.17 ratio      Urinalysis Basic - ( 2021 23:11 )    Color: Light Yellow / Appearance: Clear / S.041 / pH: x  Gluc: x / Ketone: Moderate  / Bili: Negative / Urobili: <2 mg/dL   Blood: x / Protein: Trace / Nitrite: Negative   Leuk Esterase: Negative / RBC: 0 /HPF / WBC 1 /HPF   Sq Epi: x / Non Sq Epi: 1 /HPF / Bacteria: Negative      BHB 0.7  A1c 12  UA with mod ketones    RADIOLOGY & ADDITIONAL TESTS:      
    Chief Complaint: f/u DM2    History:  Patient seen at bedside this morning. Tolerating po, does not have nausea, vomiting, abdominal pain.     MEDICATIONS  (STANDING):  acyclovir   Oral Tab/Cap 400 milliGRAM(s) Oral three times a day  amLODIPine   Tablet 2.5 milliGRAM(s) Oral daily  benzocaine 15 mG/menthol 3.6 mG (Sugar-Free) Lozenge 1 Lozenge Oral two times a day  cefTRIAXone   IVPB 1000 milliGRAM(s) IV Intermittent every 24 hours  cefTRIAXone   IVPB      clotrimazole 1% Vaginal Cream 1 Applicatorful Vaginal at bedtime  dexAMETHasone  Injectable 6 milliGRAM(s) IV Push daily  dextrose 40% Gel 15 Gram(s) Oral once  dextrose 50% Injectable 25 Gram(s) IV Push once  dextrose 50% Injectable 12.5 Gram(s) IV Push once  dextrose 50% Injectable 25 Gram(s) IV Push once  enoxaparin Injectable 40 milliGRAM(s) SubCutaneous every 12 hours  famotidine    Tablet 20 milliGRAM(s) Oral daily  glucagon  Injectable 1 milliGRAM(s) IntraMuscular once  insulin glargine Injectable (LANTUS) 28 Unit(s) SubCutaneous at bedtime  insulin lispro (ADMELOG) corrective regimen sliding scale   SubCutaneous three times a day before meals  insulin lispro (ADMELOG) corrective regimen sliding scale   SubCutaneous at bedtime  insulin lispro Injectable (ADMELOG) 12 Unit(s) SubCutaneous three times a day before meals  losartan 25 milliGRAM(s) Oral daily  remdesivir  IVPB 100 milliGRAM(s) IV Intermittent every 24 hours  remdesivir  IVPB   IV Intermittent   sodium chloride 0.9%. 1000 milliLiter(s) (100 mL/Hr) IV Continuous <Continuous>    MEDICATIONS  (PRN):  acetaminophen   Tablet .. 650 milliGRAM(s) Oral every 6 hours PRN Moderate Pain (4 - 6), Severe Pain (7 - 10)  ALBUTerol    90 MICROgram(s) HFA Inhaler 1 Puff(s) Inhalation every 6 hours PRN Shortness of Breath  benzonatate 100 milliGRAM(s) Oral three times a day PRN Cough  guaiFENesin   Syrup  (Sugar-Free) 200 milliGRAM(s) Oral every 6 hours PRN Cough      Allergies  No Known Allergies    Review of Systems:  ALL OTHER SYSTEMS REVIEWED AND NEGATIVE    PHYSICAL EXAM:  VITALS: T(C): 36.8 (01-21-21 @ 05:24)  T(F): 98.2 (01-21-21 @ 05:24), Max: 98.6 (01-20-21 @ 20:31)  HR: 63 (01-21-21 @ 05:24) (63 - 68)  BP: 134/66 (01-21-21 @ 05:24) (134/66 - 144/78)  RR:  (18 - 18)  SpO2:  (94% - 95%)  Wt(kg): --  GENERAL: NAD, well-developed  EYES: No proptosis, anicteric  HEENT:  Atraumatic, Normocephalic  RESPIRATORY: + air movement bilaterally, no respiratory distress   GI: Soft, nontender, non distended  PSYCH: Alert and oriented x 3, reactive affect     POCT Blood Glucose.: 345 mg/dL (01-21-21 @ 12:16) A 12, A 8  POCT Blood Glucose.: 180 mg/dL (01-21-21 @ 07:01) A 12, A 2  POCT Blood Glucose.: 252 mg/dL (01-20-21 @ 21:28) L 28, A 2   POCT Blood Glucose.: 417 mg/dL (01-20-21 @ 17:24) A 12, A 12  POCT Blood Glucose.: 430 mg/dL (01-20-21 @ 12:20) A 12, A 12   POCT Blood Glucose.: 357 mg/dL (01-20-21 @ 09:07)  POCT Blood Glucose.: 402 mg/dL (01-19-21 @ 21:44)  POCT Blood Glucose.: 407 mg/dL (01-19-21 @ 16:57)  POCT Blood Glucose.: 386 mg/dL (01-19-21 @ 11:52)  POCT Blood Glucose.: 304 mg/dL (01-19-21 @ 07:46)  POCT Blood Glucose.: 317 mg/dL (01-19-21 @ 04:19)  POCT Blood Glucose.: 305 mg/dL (01-19-21 @ 00:41)  POCT Blood Glucose.: 427 mg/dL (01-18-21 @ 20:51)  POCT Blood Glucose.: 364 mg/dL (01-18-21 @ 17:25)  POCT Blood Glucose.: 365 mg/dL (01-18-21 @ 16:19)      01-21    142  |  105  |  10  ----------------------------<  189<H>  3.3<L>   |  26  |  0.36<L>    EGFR if : 136  EGFR if non : 117    Ca    8.9      01-21  Mg     1.7     01-21  Phos  3.6     01-21                          
PROGRESS NOTE:     CONTACT INFO:  Johnathan Denton MD PGY-1  Internal Medicine  [facility]: [phone]  [facility] [address]  If no response, please check Resident assignment   section of [place] for most up-to-date contact info  After 7PM, please page night float    Patient is a 60y old  Female who presents with a chief complaint of COVID and hyperglycemia (2021 09:23)      SUBJECTIVE / OVERNIGHT EVENTS: Patient seen and evaluated at bedside. Patient has been frustrated stating she is not getting anything as far as meds, and is anxious. Stated that her previous PCP had relieved her from his/her care. She believes that she is not receiving steroids, but has been explained to her multiple times that she has received it. Feels restless and has productive cough and associated chest pain from constant coughing. Otherwise, denies fever, chills, nausea, vomiting, acute onset focal weakness or sensory changes.    MEDICATIONS  (STANDING):  acyclovir   Oral Tab/Cap 400 milliGRAM(s) Oral three times a day  benzocaine 15 mG/menthol 3.6 mG (Sugar-Free) Lozenge 1 Lozenge Oral two times a day  cefTRIAXone   IVPB 1000 milliGRAM(s) IV Intermittent every 24 hours  cefTRIAXone   IVPB      clotrimazole 1% Vaginal Cream 1 Applicatorful Vaginal at bedtime  dexAMETHasone  Injectable 6 milliGRAM(s) IV Push daily  dextrose 40% Gel 15 Gram(s) Oral once  dextrose 50% Injectable 25 Gram(s) IV Push once  dextrose 50% Injectable 12.5 Gram(s) IV Push once  dextrose 50% Injectable 25 Gram(s) IV Push once  enoxaparin Injectable 40 milliGRAM(s) SubCutaneous every 12 hours  famotidine    Tablet 20 milliGRAM(s) Oral daily  glucagon  Injectable 1 milliGRAM(s) IntraMuscular once  insulin glargine Injectable (LANTUS) 25 Unit(s) SubCutaneous at bedtime  insulin lispro (ADMELOG) corrective regimen sliding scale   SubCutaneous three times a day before meals  insulin lispro (ADMELOG) corrective regimen sliding scale   SubCutaneous at bedtime  insulin lispro Injectable (ADMELOG) 8 Unit(s) SubCutaneous three times a day before meals  potassium chloride   Powder 40 milliEquivalent(s) Oral once  potassium phosphate IVPB 30 milliMole(s) IV Intermittent once  remdesivir  IVPB 100 milliGRAM(s) IV Intermittent every 24 hours  remdesivir  IVPB   IV Intermittent   sodium chloride 0.9%. 1000 milliLiter(s) (100 mL/Hr) IV Continuous <Continuous>    MEDICATIONS  (PRN):  acetaminophen   Tablet .. 650 milliGRAM(s) Oral every 6 hours PRN Moderate Pain (4 - 6), Severe Pain (7 - 10)  ALBUTerol    90 MICROgram(s) HFA Inhaler 1 Puff(s) Inhalation every 6 hours PRN Shortness of Breath  benzonatate 100 milliGRAM(s) Oral three times a day PRN Cough  guaiFENesin   Syrup  (Sugar-Free) 100 milliGRAM(s) Oral every 6 hours PRN Cough      CAPILLARY BLOOD GLUCOSE      POCT Blood Glucose.: 402 mg/dL (2021 21:44)  POCT Blood Glucose.: 407 mg/dL (2021 16:57)  POCT Blood Glucose.: 386 mg/dL (2021 11:52)    I&O's Summary      PHYSICAL EXAM:  Vital Signs Last 24 Hrs  T(C): 36.7 (2021 04:32), Max: 37.2 (2021 21:00)  T(F): 98 (2021 04:32), Max: 98.9 (2021 21:00)  HR: 79 (2021 04:32) (74 - 82)  BP: 170/80 (2021 04:32) (136/88 - 170/80)  BP(mean): --  RR: 20 (2021 04:32) (20 - 20)  SpO2: 97% (2021 04:32) (93% - 97%)     PHYSICAL EXAM:  GENERAL: frustrated with pain, lying in bed with difficulty with wet cough  HEAD:  Atraumatic, Normocephalic  EYES: EOMI, conjunctiva and sclera clear  ENT: Moist mucous membranes  NECK: Supple,   CHEST/LUNG: Shallow breathing and limited 2/2 body habitus, mild crackles. Unlabored respirations.  HEART: Limited 2/2 body habitus, grossly regular rate and rhythm; No murmurs, rubs, or gallops  ABDOMEN: Tenderness to epigastric region/ upper abdomen.  EXTREMITIES:   No clubbing, cyanosis, or edema  NERVOUS SYSTEM:  Alert & Oriented X3, speech clear. No deficits   MSK: FROM all 4 extremities, full and equal strength    LABS:                        11.7   9.23  )-----------( 235      ( 2021 07:28 )             36.2         142  |  104  |  10  ----------------------------<  339<H>  3.3<L>   |  25  |  0.43<L>    Ca    8.9      2021 07:28  Phos  2.2       Mg     1.8         TPro  7.6  /  Alb  3.8  /  TBili  0.4  /  DBili  x   /  AST  45<H>  /  ALT  34<H>  /  AlkPhos  121<H>      PT/INR - ( 2021 13:13 )   PT: 13.2 sec;   INR: 1.17 ratio      Urinalysis Basic - ( 2021 23:11 )    Color: Light Yellow / Appearance: Clear / S.041 / pH: x  Gluc: x / Ketone: Moderate  / Bili: Negative / Urobili: <2 mg/dL   Blood: x / Protein: Trace / Nitrite: Negative   Leuk Esterase: Negative / RBC: 0 /HPF / WBC 1 /HPF   Sq Epi: x / Non Sq Epi: 1 /HPF / Bacteria: Negative    RADIOLOGY & ADDITIONAL TESTS:      
PROGRESS NOTE:     CONTACT INFO:  Johnathan Denton MD PGY-1  Internal Medicine  [facility]: [phone]  [facility] [address]  If no response, please check Resident assignment   section of [place] for most up-to-date contact info  After 7PM, please page night float    Patient is a 60y old  Female who presents with a chief complaint of COVID and hyperglycemia (21 Jan 2021 17:44)    SUBJECTIVE / OVERNIGHT EVENTS: Patient seen and evaluated at bedside. Patient endorses continual difficulty with breathing and productive cough. States when she goes to bathroom has some chest tightness and worsening breathing when sitting on toilet. Has mild improvement in vulvovaginitis. Otherwise, denies fever, chills, nausea, vomiting, chest pain, palpitations, abdominal pain, acute onset focal weakness or sensory changes. O2 sat 92 on RA.     MEDICATIONS  (STANDING):  acyclovir   Oral Tab/Cap 400 milliGRAM(s) Oral three times a day  amLODIPine   Tablet 2.5 milliGRAM(s) Oral daily  atorvastatin 20 milliGRAM(s) Oral at bedtime  benzocaine 15 mG/menthol 3.6 mG (Sugar-Free) Lozenge 1 Lozenge Oral two times a day  clotrimazole 1% Vaginal Cream 1 Applicatorful Vaginal at bedtime  dexAMETHasone  Injectable 6 milliGRAM(s) IV Push daily  dextrose 40% Gel 15 Gram(s) Oral once  dextrose 50% Injectable 25 Gram(s) IV Push once  dextrose 50% Injectable 12.5 Gram(s) IV Push once  dextrose 50% Injectable 25 Gram(s) IV Push once  enoxaparin Injectable 40 milliGRAM(s) SubCutaneous every 12 hours  famotidine    Tablet 20 milliGRAM(s) Oral daily  glucagon  Injectable 1 milliGRAM(s) IntraMuscular once  insulin glargine Injectable (LANTUS) 28 Unit(s) SubCutaneous at bedtime  insulin lispro (ADMELOG) corrective regimen sliding scale   SubCutaneous three times a day before meals  insulin lispro (ADMELOG) corrective regimen sliding scale   SubCutaneous at bedtime  insulin lispro Injectable (ADMELOG) 18 Unit(s) SubCutaneous before breakfast  insulin lispro Injectable (ADMELOG) 18 Unit(s) SubCutaneous before lunch  insulin lispro Injectable (ADMELOG) 18 Unit(s) SubCutaneous before dinner  losartan 25 milliGRAM(s) Oral daily  potassium chloride   Powder 40 milliEquivalent(s) Oral once  remdesivir  IVPB 100 milliGRAM(s) IV Intermittent every 24 hours  remdesivir  IVPB   IV Intermittent   sodium chloride 0.9%. 1000 milliLiter(s) (100 mL/Hr) IV Continuous <Continuous>    MEDICATIONS  (PRN):  acetaminophen   Tablet .. 650 milliGRAM(s) Oral every 6 hours PRN Moderate Pain (4 - 6), Severe Pain (7 - 10)  ALBUTerol    90 MICROgram(s) HFA Inhaler 1 Puff(s) Inhalation every 6 hours PRN Shortness of Breath  benzonatate 100 milliGRAM(s) Oral three times a day PRN Cough  guaiFENesin   Syrup  (Sugar-Free) 200 milliGRAM(s) Oral every 6 hours PRN Cough      CAPILLARY BLOOD GLUCOSE      POCT Blood Glucose.: 378 mg/dL (21 Jan 2021 22:35)  POCT Blood Glucose.: 431 mg/dL (21 Jan 2021 17:42)  POCT Blood Glucose.: 345 mg/dL (21 Jan 2021 12:16)    I&O's Summary    PHYSICAL EXAM:  Vital Signs Last 24 Hrs  T(C): 36.7 (22 Jan 2021 05:25), Max: 36.8 (21 Jan 2021 15:18)  T(F): 98.1 (22 Jan 2021 05:25), Max: 98.2 (21 Jan 2021 15:18)  HR: 67 (22 Jan 2021 05:25) (67 - 69)  BP: 155/78 (22 Jan 2021 05:25) (130/66 - 160/84)  BP(mean): --  RR: 20 (22 Jan 2021 05:25) (18 - 20)  SpO2: 93% (22 Jan 2021 05:25) (92% - 96%)    PHYSICAL EXAM: limited due to reducing COVID exposure  GENERAL: anxious and ill appearing but still frustrated with pain, lying in bed with difficulty with wet cough  HEAD:  Atraumatic, Normocephalic  EYES: EOMI, conjunctiva and sclera clear  ENT: Moist mucous membranes  NECK: Supple,   CHEST/LUNG: Shallow breathing and limited 2/2 body habitus. Unlabored respirations.  HEART: Limited 2/2 body habitus.  EXTREMITIES:   No worsening edema  NERVOUS SYSTEM:  Alert & Oriented X3, speech clear. No deficits   MSK: FROM all 4 extremities    LABS:                        11.9   8.12  )-----------( 282      ( 22 Jan 2021 06:44 )             37.3     01-22    141  |  103  |  9   ----------------------------<  245<H>  3.3<L>   |  25  |  0.37<L>    Ca    9.1      22 Jan 2021 06:44  Phos  2.7     01-22  Mg     1.8     01-22    RADIOLOGY & ADDITIONAL TESTS:      
    Chief Complaint: f/u DM2    History:  Patient seen at bedside this morning, she had eaten all of her breakfast. Has some congestion and cough. No nausea, vomiting, abdominal pain. BG remains high. States that she is not snacking in between meals.     MEDICATIONS  (STANDING):  acyclovir   Oral Tab/Cap 400 milliGRAM(s) Oral three times a day  albuterol/ipratropium (CFC free) Inhaler. 1 Puff(s) Inhalation four times a day  amLODIPine   Tablet 2.5 milliGRAM(s) Oral daily  atorvastatin 20 milliGRAM(s) Oral at bedtime  benzocaine 15 mG/menthol 3.6 mG (Sugar-Free) Lozenge 1 Lozenge Oral two times a day  clotrimazole 1% Vaginal Cream 1 Applicatorful Vaginal at bedtime  dexAMETHasone  Injectable 6 milliGRAM(s) IV Push daily  dextrose 40% Gel 15 Gram(s) Oral once  dextrose 50% Injectable 25 Gram(s) IV Push once  dextrose 50% Injectable 12.5 Gram(s) IV Push once  dextrose 50% Injectable 25 Gram(s) IV Push once  enoxaparin Injectable 40 milliGRAM(s) SubCutaneous every 12 hours  famotidine    Tablet 20 milliGRAM(s) Oral daily  glucagon  Injectable 1 milliGRAM(s) IntraMuscular once  insulin glargine Injectable (LANTUS) 28 Unit(s) SubCutaneous at bedtime  insulin lispro (ADMELOG) corrective regimen sliding scale   SubCutaneous three times a day before meals  insulin lispro (ADMELOG) corrective regimen sliding scale   SubCutaneous at bedtime  insulin lispro Injectable (ADMELOG) 18 Unit(s) SubCutaneous before breakfast  insulin lispro Injectable (ADMELOG) 18 Unit(s) SubCutaneous before lunch  insulin lispro Injectable (ADMELOG) 18 Unit(s) SubCutaneous before dinner  losartan 25 milliGRAM(s) Oral daily  potassium chloride   Powder 40 milliEquivalent(s) Oral once  remdesivir  IVPB 100 milliGRAM(s) IV Intermittent every 24 hours  remdesivir  IVPB   IV Intermittent   sodium chloride 0.9%. 1000 milliLiter(s) (100 mL/Hr) IV Continuous <Continuous>    MEDICATIONS  (PRN):  acetaminophen   Tablet .. 650 milliGRAM(s) Oral every 6 hours PRN Moderate Pain (4 - 6), Severe Pain (7 - 10)  benzonatate 100 milliGRAM(s) Oral three times a day PRN Cough  guaiFENesin   Syrup  (Sugar-Free) 200 milliGRAM(s) Oral every 6 hours PRN Cough      Allergies  No Known Allergies    Review of Systems:  ALL OTHER SYSTEMS REVIEWED AND NEGATIVE    PHYSICAL EXAM:  VITALS: T(C): 36.7 (01-22-21 @ 05:25)  T(F): 98.1 (01-22-21 @ 05:25), Max: 98.2 (01-21-21 @ 15:18)  HR: 67 (01-22-21 @ 05:25) (67 - 69)  BP: 155/78 (01-22-21 @ 05:25) (130/66 - 160/84)  RR:  (18 - 20)  SpO2:  (92% - 96%)  Wt(kg): --  GENERAL: NAD, well-developed  EYES: No proptosis, anicteric  HEENT:  Atraumatic, Normocephalic  RESPIRATORY: + air movement bilaterally, no respiratory distress   GI: Soft, nontender, non distended  PSYCH: Alert and oriented x 3, reactive affect, euthymic mood     POCT Blood Glucose.: 380 mg/dL (01-22-21 @ 11:44)  POCT Blood Glucose.: 260 mg/dL (01-22-21 @ 09:03) A 18, A 6   POCT Blood Glucose.: 378 mg/dL (01-21-21 @ 22:35) L 28, A 6  POCT Blood Glucose.: 431 mg/dL (01-21-21 @ 17:42) A 18, A 8   POCT Blood Glucose.: 345 mg/dL (01-21-21 @ 12:16) A 12, A 8   POCT Blood Glucose.: 180 mg/dL (01-21-21 @ 07:01)  POCT Blood Glucose.: 252 mg/dL (01-20-21 @ 21:28)  POCT Blood Glucose.: 417 mg/dL (01-20-21 @ 17:24)  POCT Blood Glucose.: 430 mg/dL (01-20-21 @ 12:20)  POCT Blood Glucose.: 357 mg/dL (01-20-21 @ 09:07)  POCT Blood Glucose.: 402 mg/dL (01-19-21 @ 21:44)  POCT Blood Glucose.: 407 mg/dL (01-19-21 @ 16:57)      01-22    141  |  103  |  9   ----------------------------<  245<H>  3.3<L>   |  25  |  0.37<L>    EGFR if : 135  EGFR if non : 116    Ca    9.1      01-22  Mg     1.8     01-22  Phos  2.7     01-22

## 2021-01-22 NOTE — PROGRESS NOTE ADULT - PROBLEM SELECTOR PROBLEM 3
Essential hypertension
UTI (urinary tract infection)
Essential hypertension

## 2021-01-22 NOTE — PROGRESS NOTE ADULT - PROBLEM SELECTOR PLAN 2
- see plan above  - RD consult   - for discharge: patient unsure if she is able to do basal bolus at home, she has been instructed to notify endocrine service of her decision. At this time, plan to dc on Novolog 70/30, doses to be determined. She lives in [place], will need to find an endocrinologist closer to home. - see plan above  - RD consult   - for discharge: patient unsure if she is able to do basal bolus at home, she has been instructed to notify endocrine service of her decision. At this time, plan to dc on Novolog 70/30, doses to be determined. She lives in [place], will need to find an endocrinologist closer to home.    ADDENDUM: spoke with primary team, patient to be discharged home today off steroids. Suggest dc on Novolog 70/30 x 2 days - 60 units before breakfast and 40 units before dinner x 2 days (for the weekend, while steroid effect lasts), then decrease to 40 units before breakfast and 30 units before dinner.

## 2021-01-22 NOTE — DISCHARGE NOTE NURSING/CASE MANAGEMENT/SOCIAL WORK - PATIENT PORTAL LINK FT
You can access the FollowMyHealth Patient Portal offered by [facility] by registering at the following website: [email or web address]. By joining [employer]’s FollowMyHealth portal, you will also be able to view your health information using other applications (apps) compatible with our system.

## 2021-01-22 NOTE — PROGRESS NOTE ADULT - ASSESSMENT
60 year old woman with PMH of uncontrolled DM2, HTN, HLD, asthma, recent COVID+, presenting with cough and SOB for 1 week, admitted for management of COVID, also on steroids with hyperglycemia to the 400's. Consult called for management of uncontrolled DM2 and steroid induced hyperglycemia. A1c is 12.1%. BG goal 100-180 mg/dL. 
Patient is a 59 yo F with PMHx of DM, HTN, asthma, recent COVID+, presenting with worsening productive cough and SOB for 1 week, found to require supplemental O2 and have hyperglycemia to >400s, starting dexamethasone/ remdesivir.
Patient is a 59 yo F with PMHx of DM, HTN, asthma, recent COVID+, presenting with worsening productive cough and SOB for 1 week, found to require supplemental O2 and have hyperglycemia to >400s, starting dexamethasone/ remdesivir. 
60 year old woman with PMH of uncontrolled DM2, HTN, HLD, asthma, recent COVID+, presenting with cough and SOB for 1 week, admitted for management of COVID, also on steroids with hyperglycemia to the 400's. Consult called for management of uncontrolled DM2 and steroid induced hyperglycemia. A1c is 12.1%. BG goal 100-180 mg/dL. 
Patient is a 61 yo F with PMHx of DM, HTN, asthma, recent COVID+, presenting with worsening productive cough and SOB for 1 week, found to require supplemental O2 and have hyperglycemia to >400s, on dexamethasone/ remdesivir. 
Patient is a 61 yo F with PMHx of DM, HTN, asthma, recent COVID+, presenting with worsening productive cough and SOB for 1 week, found to require supplemental O2 and have hyperglycemia to >400s, starting dexamethasone/ remdesivir.

## 2021-01-22 NOTE — PROGRESS NOTE ADULT - REASON FOR ADMISSION
COVID and hyperglycemia

## 2021-01-22 NOTE — PROGRESS NOTE ADULT - ATTENDING COMMENTS
Patient seen and examined  Agree with above A/p   61 yo F with PMHx of DM, HTN, asthma, recent COVID+, presenting with worsening productive cough and SOB for 1 week, found to require supplemental O2 and have hyperglycemia to >400s, on dexamethasone/ remdesivir.   Patient with morbid Obesity and will f/u with PCP for weight loss programm  Sat 93 to 96 % on RA  Lungs cta, cor rrr, abd soft obes, ext neg e/c/c  A/P  Covid 19 PNA - treated with Remdesivir and steroid./ DM11 uncontrolled pta.  complete Remdesivir today - plan for home today  Out patient follow up with Endocrine for control of Glucose  D/c home today 40 min to coordinate d/c Patient seen and examined  Agree with above A/p   61 yo F with PMHx of DM, HTN, asthma, recent COVID+, presenting with worsening productive cough and SOB for 1 week, found to require supplemental O2 and have hyperglycemia to >400s, on dexamethasone/ remdesivir.   Patient with morbid Obesity and will f/u with PCP for weight loss programm  Sat 93 to 96 % on RA  Lungs cta, cor rrr, abd soft obes, ext neg e/c/c  A/P  Covid 19 PNA - treated with Remdesivir and steroid./ DM11 uncontrolled pta.  complete Remdesivir today - plan for home today  Out patient follow up with Endocrine for control of Glucose  Endocrine  notes "patient to be discharged home today off steroids. Suggest dc on Novolog 70/30 x 2 days - 60 units before breakfast and 40 units before dinner x 2 days (for the weekend, while steroid effect lasts), then decrease to 40 units before breakfast and 30 units before dinner."  Endocrine greatly appreciated.  D/c home today 40 min to coordinate d/c

## 2021-01-23 ENCOUNTER — TRANSCRIPTION ENCOUNTER (OUTPATIENT)
Age: 61
End: 2021-01-23

## 2021-01-23 RX ORDER — AMLODIPINE BESYLATE 2.5 MG/1
1 TABLET ORAL
Qty: 30 | Refills: 0
Start: 2021-01-23 | End: 2021-02-21

## 2021-01-25 ENCOUNTER — TRANSCRIPTION ENCOUNTER (OUTPATIENT)
Age: 61
End: 2021-01-25

## 2021-01-26 ENCOUNTER — TRANSCRIPTION ENCOUNTER (OUTPATIENT)
Age: 61
End: 2021-01-26

## 2022-02-11 NOTE — PROGRESS NOTE ADULT - PROBLEM SELECTOR PLAN 3
- monitor on current medications
Has recent history of positive UA with klebsiella 1/15  - s/p CTX    #Had vulvovaginitis/ fungal infxn  - c/w acyclovir and topical antifungal
Has recent history of positive UA with klebsiella 1/15  - c/w CTX    #Had vulvovaginitis/ fungal infxn  - c/w acyclovir and topical antifungal
- BP improved, c/w current medications
Has recent history of positive UA with klebsiella 1/15  - c/w CTX
Has recent history of positive UA with klebsiella 1/15  - c/w CTX
Declines

## 2023-06-21 NOTE — ED ADULT NURSE NOTE - NS ED NURSE RECORD ANOTHER HT AND WT
[address]    NAME: Claudio Saavedra  AGE: 52 y o  SEX: female  : 1976     DATE: 2023     Assessment and Plan:     Problem List Items Addressed This Visit    None  Visit Diagnoses     Annual physical exam    -  Primary        Immunizations and preventive care screenings were discussed with patient today  Appropriate education was printed on patient's after visit summary  Counseling:  Dental Health: discussed importance of regular tooth brushing, flossing, and dental visits  Exercise: the importance of regular exercise/physical activity was discussed  Recommend exercise 3-5 times per week for at least 30 minutes  BMI Counseling: Body mass index is 35 51 kg/m²  The BMI is above normal  Nutrition recommendations include decreasing portion sizes, encouraging healthy choices of fruits and vegetables, consuming healthier snacks, moderation in carbohydrate intake and increasing intake of lean protein  Exercise recommendations include exercising 3-5 times per week and strength training exercises  Rationale for BMI follow-up plan is due to patient being overweight or obese  Return in about 1 year (around 2024)  Chief Complaint:     Chief Complaint   Patient presents with   • Physical Exam     headaches      History of Present Illness:     Adult Annual Physical   Patient here for a comprehensive physical exam  The patient reports headaches intermittently that seem to be more prevalent in the mornings  She reports that the pains feel different than previous migraines  She reports that she stopped taking the fluoxetine because she feels like since she changed jobs her mood has improved  She does report getting hot flashes occasionally  She was on vacation for the past two weeks with the hot flashes and headache seem to resolve   She also report that she has been seeing a chiropractor which have helped with the headaches  Discussed with patient option available to perform colorectal cancer screenings and patient would like to think about it before committing to a test       Diet and Physical Activity  Diet/Nutrition: well balanced diet and consuming 3-5 servings of fruits/vegetables daily  Exercise: walking, moderate cardiovascular exercise, 3-4 times a week on average and 30-60 minutes on average  Depression Screening  PHQ-2/9 Depression Screening    Little interest or pleasure in doing things: 0 - not at all  Feeling down, depressed, or hopeless: 0 - not at all  Trouble falling or staying asleep, or sleeping too much: 0 - not at all  Feeling tired or having little energy: 0 - not at all  Poor appetite or overeatin - not at all  Feeling bad about yourself - or that you are a failure or have let yourself or your family down: 0 - not at all  Trouble concentrating on things, such as reading the newspaper or watching television: 0 - not at all  Moving or speaking so slowly that other people could have noticed  Or the opposite - being so fidgety or restless that you have been moving around a lot more than usual: 0 - not at all  Thoughts that you would be better off dead, or of hurting yourself in some way: 0 - not at all  PHQ-9 Score: 0   PHQ-9 Interpretation: No or Minimal depression        General Health  Sleep: sleeps poorly and gets 4-6 hours of sleep on average  Hearing: normal - bilateral   Vision: no vision problems, goes for regular eye exams and low magnification of over the counter glasses  Dental: regular dental visits, brushes teeth twice daily and flosses teeth occasionally  /GYN Health  Patient is: perimenopausal  Last menstrual period: unknown  Contraceptive method: Implant  Review of Systems:     Review of Systems   Constitutional: Negative for activity change, appetite change and unexpected weight change     HENT: Negative for dental problem, ear pain, hearing loss, nosebleeds, sneezing, sore throat, tinnitus and trouble swallowing  Eyes: Negative for visual disturbance  Respiratory: Negative for cough, chest tightness, shortness of breath and wheezing  Cardiovascular: Negative for chest pain, palpitations and leg swelling  Gastrointestinal: Negative for abdominal distention, abdominal pain, constipation, diarrhea and nausea  Endocrine: Negative for polydipsia, polyphagia and polyuria  Genitourinary: Negative  Musculoskeletal: Negative for arthralgias, back pain, myalgias and neck pain  Being adjusted by chiropractor that has helped with headaches   Skin: Negative for color change and rash  Neurological: Positive for headaches  Negative for dizziness, weakness and light-headedness  Psychiatric/Behavioral: Negative  Negative for dysphoric mood and sleep disturbance  The patient is not nervous/anxious  Past Medical History:     Past Medical History:   Diagnosis Date   • No known health problems    • Postpartum depression       Past Surgical History:     Past Surgical History:   Procedure Laterality Date   • BREAST BIOPSY Left     pt does not remember when, negative   • CLOSED REDUCTION AND PERCUTANEOUS PINNING DISTAL RADIUS FRACTURE     • NO PAST SURGERIES     • WISDOM TOOTH EXTRACTION        Social History:     Social History     Socioeconomic History   • Marital status: Single     Spouse name: Ki Rizo   • Number of children: 3   • Years of education: None   • Highest education level: Master's degree (e g , MA, MS, Desiree, MEd, MSW, OSEI)   Occupational History     Comment:    Tobacco Use   • Smoking status: Never     Passive exposure: Past   • Smokeless tobacco: Never   Vaping Use   • Vaping Use: Never used   Substance and Sexual Activity   • Alcohol use:  Yes   • Drug use: Never   • Sexual activity: Yes     Partners: Male     Birth control/protection: Condom Male   Other Topics Concern   • None   Social History Narrative    Daily tea and "coffee consumption     Social Determinants of Health     Financial Resource Strain: Not on file   Food Insecurity: Not on file   Transportation Needs: Not on file   Physical Activity: Not on file   Stress: Stress Concern Present (8/20/2019)    Thien[address] Jose Maria Mack    • Feeling of Stress : To some extent   Social Connections: Not on file   Intimate Partner Violence: Not on file   Housing Stability: Not on file      Family History:     Family History   Problem Relation Age of Onset   • Heart disease Father    • Diabetes type I Father    • Breast cancer Family    • No Known Problems Mother    • Breast cancer Maternal Grandmother    • Prostate cancer Maternal Grandfather    • Lung cancer Paternal Grandmother    • No Known Problems Daughter    • No Known Problems Son    • No Known Problems Son    • No Known Problems Brother    • No Known Problems Maternal Aunt       Current Medications:     No current outpatient medications on file  No current facility-administered medications for this visit  Allergies:     No Known Allergies   Physical Exam:     /86 (BP Location: Left arm, Patient Position: Sitting)   Pulse 68   Temp 97 6 °F (36 4 °C)   Ht 5' 6\" (1 676 m)   Wt 99 8 kg (220 lb)   LMP  (LMP Unknown)   SpO2 98%   BMI 35 51 kg/m² (Reviewed)    Physical Exam  Vitals reviewed  Constitutional:       General: She is not in acute distress  Appearance: She is well-developed and well-groomed  She is not ill-appearing  HENT:      Head: Normocephalic and atraumatic  Right Ear: External ear normal       Left Ear: External ear normal       Nose: Nose normal       Mouth/Throat:      Lips: Pink  Mouth: Mucous membranes are moist       Pharynx: Oropharynx is clear  Eyes:      General: Lids are normal       Extraocular Movements: Extraocular movements intact        Conjunctiva/sclera: Conjunctivae normal       Pupils: Pupils are equal, round, " and reactive to light  Neck:      Thyroid: No thyromegaly or thyroid tenderness  Trachea: Trachea and phonation normal    Cardiovascular:      Rate and Rhythm: Normal rate and regular rhythm  Pulses: Normal pulses  Radial pulses are 2+ on the right side and 2+ on the left side  Dorsalis pedis pulses are 2+ on the right side and 2+ on the left side  Posterior tibial pulses are 2+ on the right side and 2+ on the left side  Heart sounds: Normal heart sounds  No murmur heard  Pulmonary:      Effort: Pulmonary effort is normal       Breath sounds: Normal breath sounds  Abdominal:      General: Abdomen is flat  Bowel sounds are normal  There is no distension  Palpations: Abdomen is soft  Tenderness: There is no abdominal tenderness  Musculoskeletal:      Cervical back: Neck supple  Right lower leg: No edema  Left lower leg: No edema  Skin:     General: Skin is warm and dry  Capillary Refill: Capillary refill takes less than 2 seconds  Neurological:      General: No focal deficit present  Mental Status: She is alert and oriented to person, place, and time  Cranial Nerves: Cranial nerves 2-12 are intact  Motor: Motor function is intact  Psychiatric:         Mood and Affect: Mood normal          Behavior: Behavior normal  Behavior is cooperative            Gita No

## 2023-09-02 ENCOUNTER — EMERGENCY (EMERGENCY)
Facility: HOSPITAL | Age: 63
LOS: 0 days | Discharge: HOME | End: 2023-09-02
Attending: STUDENT IN AN ORGANIZED HEALTH CARE EDUCATION/TRAINING PROGRAM
Payer: MEDICAID

## 2023-09-02 VITALS
DIASTOLIC BLOOD PRESSURE: 69 MMHG | SYSTOLIC BLOOD PRESSURE: 104 MMHG | HEIGHT: 61 IN | HEART RATE: 81 BPM | OXYGEN SATURATION: 97 % | RESPIRATION RATE: 19 BRPM | WEIGHT: 293 LBS | TEMPERATURE: 98 F

## 2023-09-02 DIAGNOSIS — E11.9 TYPE 2 DIABETES MELLITUS WITHOUT COMPLICATIONS: ICD-10-CM

## 2023-09-02 DIAGNOSIS — Z79.4 LONG TERM (CURRENT) USE OF INSULIN: ICD-10-CM

## 2023-09-02 DIAGNOSIS — R25.1 TREMOR, UNSPECIFIED: ICD-10-CM

## 2023-09-02 PROBLEM — J45.909 UNSPECIFIED ASTHMA, UNCOMPLICATED: Chronic | Status: ACTIVE | Noted: 2021-01-18

## 2023-09-02 PROBLEM — I10 ESSENTIAL (PRIMARY) HYPERTENSION: Chronic | Status: ACTIVE | Noted: 2021-01-18

## 2023-09-02 LAB
ANION GAP SERPL CALC-SCNC: 6 MMOL/L — SIGNIFICANT CHANGE UP (ref 5–17)
BASOPHILS # BLD AUTO: 0.04 K/UL — SIGNIFICANT CHANGE UP (ref 0–0.2)
BASOPHILS NFR BLD AUTO: 0.4 % — SIGNIFICANT CHANGE UP (ref 0–2)
BUN SERPL-MCNC: 21 MG/DL — SIGNIFICANT CHANGE UP (ref 7–23)
CALCIUM SERPL-MCNC: 9.4 MG/DL — SIGNIFICANT CHANGE UP (ref 8.5–10.1)
CHLORIDE SERPL-SCNC: 105 MMOL/L — SIGNIFICANT CHANGE UP (ref 96–108)
CK SERPL-CCNC: 91 U/L — SIGNIFICANT CHANGE UP (ref 26–192)
CO2 SERPL-SCNC: 27 MMOL/L — SIGNIFICANT CHANGE UP (ref 22–31)
CREAT SERPL-MCNC: 0.62 MG/DL — SIGNIFICANT CHANGE UP (ref 0.5–1.3)
EGFR: 100 ML/MIN/1.73M2 — SIGNIFICANT CHANGE UP
EOSINOPHIL # BLD AUTO: 0.11 K/UL — SIGNIFICANT CHANGE UP (ref 0–0.5)
EOSINOPHIL NFR BLD AUTO: 1.1 % — SIGNIFICANT CHANGE UP (ref 0–6)
GLUCOSE SERPL-MCNC: 85 MG/DL — SIGNIFICANT CHANGE UP (ref 70–99)
HCT VFR BLD CALC: 35.2 % — SIGNIFICANT CHANGE UP (ref 34.5–45)
HGB BLD-MCNC: 12 G/DL — SIGNIFICANT CHANGE UP (ref 11.5–15.5)
IMM GRANULOCYTES NFR BLD AUTO: 0.2 % — SIGNIFICANT CHANGE UP (ref 0–0.9)
LYMPHOCYTES # BLD AUTO: 3.74 K/UL — HIGH (ref 1–3.3)
LYMPHOCYTES # BLD AUTO: 36.6 % — SIGNIFICANT CHANGE UP (ref 13–44)
MAGNESIUM SERPL-MCNC: 2.1 MG/DL — SIGNIFICANT CHANGE UP (ref 1.6–2.6)
MCHC RBC-ENTMCNC: 30 PG — SIGNIFICANT CHANGE UP (ref 27–34)
MCHC RBC-ENTMCNC: 34.1 G/DL — SIGNIFICANT CHANGE UP (ref 32–36)
MCV RBC AUTO: 88 FL — SIGNIFICANT CHANGE UP (ref 80–100)
MONOCYTES # BLD AUTO: 0.61 K/UL — SIGNIFICANT CHANGE UP (ref 0–0.9)
MONOCYTES NFR BLD AUTO: 6 % — SIGNIFICANT CHANGE UP (ref 2–14)
NEUTROPHILS # BLD AUTO: 5.7 K/UL — SIGNIFICANT CHANGE UP (ref 1.8–7.4)
NEUTROPHILS NFR BLD AUTO: 55.7 % — SIGNIFICANT CHANGE UP (ref 43–77)
NRBC # BLD: 0 /100 WBCS — SIGNIFICANT CHANGE UP (ref 0–0)
PLATELET # BLD AUTO: 312 K/UL — SIGNIFICANT CHANGE UP (ref 150–400)
POTASSIUM SERPL-MCNC: 4.1 MMOL/L — SIGNIFICANT CHANGE UP (ref 3.5–5.3)
POTASSIUM SERPL-SCNC: 4.1 MMOL/L — SIGNIFICANT CHANGE UP (ref 3.5–5.3)
RBC # BLD: 4 M/UL — SIGNIFICANT CHANGE UP (ref 3.8–5.2)
RBC # FLD: 12.5 % — SIGNIFICANT CHANGE UP (ref 10.3–14.5)
SODIUM SERPL-SCNC: 138 MMOL/L — SIGNIFICANT CHANGE UP (ref 135–145)
T4 AB SER-ACNC: 7 UG/DL — SIGNIFICANT CHANGE UP (ref 4.6–12)
TSH SERPL-MCNC: 2.3 UU/ML — SIGNIFICANT CHANGE UP (ref 0.36–3.74)
WBC # BLD: 10.22 K/UL — SIGNIFICANT CHANGE UP (ref 3.8–10.5)
WBC # FLD AUTO: 10.22 K/UL — SIGNIFICANT CHANGE UP (ref 3.8–10.5)

## 2023-09-02 PROCEDURE — G1004: CPT

## 2023-09-02 PROCEDURE — 99284 EMERGENCY DEPT VISIT MOD MDM: CPT

## 2023-09-02 PROCEDURE — 70450 CT HEAD/BRAIN W/O DYE: CPT | Mod: 26,MG

## 2023-09-02 RX ORDER — SODIUM CHLORIDE 9 MG/ML
1000 INJECTION INTRAMUSCULAR; INTRAVENOUS; SUBCUTANEOUS ONCE
Refills: 0 | Status: DISCONTINUED | OUTPATIENT
Start: 2023-09-02 | End: 2023-09-02

## 2023-09-02 NOTE — ED PROVIDER NOTE - PATIENT PORTAL LINK FT
You can access the FollowMyHealth Patient Portal offered by [facility] by registering at the following website: [email or web address]. By joining [employer]’s FollowMyHealth portal, you will also be able to view your health information using other applications (apps) compatible with our system.

## 2023-09-02 NOTE — ED PROVIDER NOTE - NSFOLLOWUPCLINICS_GEN_ALL_ED_FT
[facility] Endocr[facility]  Endocrinology  [address] [place] [address]  Phone: [phone]  Fax:

## 2023-09-02 NOTE — ED ADULT NURSE NOTE - NSFALLUNIVINTERV_ED_ALL_ED
Bed/Stretcher in lowest position, wheels locked, appropriate side rails in place/Call bell, personal items and telephone in reach/Instruct patient to call for assistance before getting out of bed/chair/stretcher/Non-slip footwear applied when patient is off stretcher/[place] to call system/Physically safe environment - no spills, clutter or unnecessary equipment/Purposeful proactive rounding/Room/bathroom lighting operational, light cord in reach

## 2023-09-02 NOTE — ED PROVIDER NOTE - NSPTACCESSSVCSAPPTDETAILS_ED_ALL_ED_FT
needs neurology followup/evaluation for generalized shaking for 1 month. also needs endocrinologist due for followup on her diabetes medications.

## 2023-09-02 NOTE — ED ADULT TRIAGE NOTE - CHIEF COMPLAINT QUOTE
States "I have been having tremors in my head and feet up and down my body for 1 month." Reports symptoms are getting worse. PMH HTN, DM, spinal stenosis.

## 2023-09-02 NOTE — ED PROVIDER NOTE - CLINICAL SUMMARY MEDICAL DECISION MAKING FREE TEXT BOX
64 y/o F hx of DM presents for tremors for past 1 week. states she has full body shaking. no clear tremors visualize don my exam. denies loc. well appearing. CT head given ongoing duration of symptoms - r/o mass. r/o lyte abn. FSG wnl, controlled. states symptoms started after she was started on a new DM medication, but her sugars have been more controlled now. encourage f/u with endocrinologist/primary/neurologist for evaluation and assessment of her medications. 62 y/o F hx of DM presents for tremors for past 1 week. states she has full body shaking. no clear tremors visualize don my exam. denies loc. well appearing. CT head given ongoing duration of symptoms - r/o mass. r/o lyte abn. FSG wnl, controlled. states symptoms started after she was started on a new DM medication, but her sugars have been more controlled now. encourage f/u with endocrinologist/primary/neurologist for evaluation and assessment of her medications. would defer to her regular doctors who prescribes her DM medications for any medication changes as patient is stable w/ no acute findings.    patient has no acute pain , denies shortness of breath, denies chest pain, denies abdominal pain.   labs and CT reviewed. patient stable, observed with no tremor like movements here in the ER.  at bedside. followup emphasized.   Conversation had with patient regarding results of testing. Patient agrees with plan for discharge at this time. Patient agrees to comply with follow up with PCP. Return to ED precautions and discharge instructions given to patient. 62 y/o F hx of DM presents for tremors for past 1 week. states she has full body shaking. no clear tremors visualize don my exam. denies loc. well appearing. CT head given ongoing duration of symptoms - r/o mass. r/o lyte abn. FSG wnl, controlled. states symptoms started after she was started on a new DM medication, but her sugars have been more controlled now. encourage f/u with endocrinologist/primary/neurologist for evaluation and assessment of her medications. would defer to her regular doctors who prescribes her DM medications for any medication changes as patient is stable w/ no acute findings.    patient has no acute pain , denies shortness of breath, denies chest pain, denies abdominal pain.   consider thyroid abnormality as well.  will need neurology to assess, no clear resting or active/essential tremor on my exam. patient lying comfortably and still in the bed w/ A&ox3 mental status.   labs and CT reviewed. patient stable, observed with no tremor like movements here in the ER.  at bedside. followup emphasized.   Conversation had with patient regarding results of testing. Patient agrees with plan for discharge at this time. Patient agrees to comply with follow up with PCP. Return to ED precautions and discharge instructions given to patient.

## 2023-09-02 NOTE — ED PROVIDER NOTE - CARE PROVIDER_API CALL
Malik Mao)  Neurology; Neurophysiology  [address] [facility] [address]  [place] [address]  Phone: [phone]  Fax: [phone]  Follow Up Time: 7-10 Days   Hemigard Postcare Instructions: The HEMIGARD strips are to remain completely dry for at least 5-7 days.

## 2023-09-02 NOTE — ED PROVIDER NOTE - PHYSICAL EXAMINATION
General: Well appearing female in no acute distress  HEENT: Normocephalic, atraumatic. Moist mucous membranes. Oropharynx clear. No lymphadenopathy.  Eyes: No scleral icterus. EOMI. MASTER.  Neck:. Soft and supple. Full ROM without pain. No midline tenderness  Cardiac: Regular rate and regular rhythm. No murmurs, rubs, gallops. Peripheral pulses 2+ and symmetric. No LE edema.  Resp: Lungs CTAB. Speaking in full sentences. No wheezes, rales or rhonchi.  Abd: Soft, non-tender, non-distended. No guarding or rebound. No scars, masses, or lesions.  Back: Spine midline and non-tender. No CVA tenderness.    Skin: No rashes, abrasions, or lacerations.  Neuro: AO x 3. Moves all extremities symmetrically. Motor strength and sensation grossly intact. steady gait, no nystagmus. no tremors appreciated at rest or w/ active movement.

## 2023-09-02 NOTE — ED PROVIDER NOTE - OBJECTIVE STATEMENT
62 y/o F hx of DM presents for tremors for past 1 week. states she has full body shaking. states symptoms started after she was started on a new DM medication, but her sugars have been more controlled now. denies any vision changes. states the shaking is intermittent but daily. denies any acute pain. denies chest pain/sob. denies abdominal pain. denies vision changes. denies headaches. denies worsening shaking w/ movements. denies fever/chills. denies recent illness. denies changes in bowel movements.

## 2023-09-02 NOTE — ED ADULT NURSE NOTE - NSICDXPASTMEDICALHX_GEN_ALL_CORE_FT
PAST MEDICAL HISTORY:  Asthma     Asthma with bronchitis     HTN (hypertension)     Type 2 diabetes mellitus without complication, without long-term current use of insulin

## 2023-09-02 NOTE — ED PROVIDER NOTE - NSFOLLOWUPINSTRUCTIONS_ED_ALL_ED_FT
Followup with neurologist, endocrinologist, primary care doctor in next 1-14 days.    return if symptoms worsen, worsening tremors, chest pain, shortness of breath, abdominal pain, vision changes, headache, numbness or tingling.

## 2024-05-23 NOTE — PROGRESS NOTE ADULT - PROBLEM/PLAN-2
DISPLAY PLAN FREE TEXT
Her/She

## 2024-07-11 NOTE — PATIENT PROFILE ADULT - NSPROPTRIGHTREPPHONE_GEN_A_NUR
Upon review of the In Basket request and the patient's chart, initial outreach has been made via fax to facility. Please see Contacts section for details.     Thank you  Charles Goodwin MA    patient does not remember

## 2024-09-03 ENCOUNTER — APPOINTMENT (OUTPATIENT)
Dept: CARDIOLOGY | Facility: HOSPITAL | Age: 64
End: 2024-09-03

## 2024-10-03 ENCOUNTER — EMERGENCY (EMERGENCY)
Facility: HOSPITAL | Age: 64
LOS: 1 days | Discharge: HOME | End: 2024-10-03
Attending: EMERGENCY MEDICINE | Admitting: EMERGENCY MEDICINE
Payer: MEDICAID

## 2024-10-03 VITALS
HEART RATE: 75 BPM | WEIGHT: 289.03 LBS | TEMPERATURE: 98 F | RESPIRATION RATE: 18 BRPM | DIASTOLIC BLOOD PRESSURE: 79 MMHG | SYSTOLIC BLOOD PRESSURE: 131 MMHG | OXYGEN SATURATION: 97 % | HEIGHT: 62 IN

## 2024-10-03 VITALS
SYSTOLIC BLOOD PRESSURE: 122 MMHG | HEART RATE: 77 BPM | OXYGEN SATURATION: 100 % | DIASTOLIC BLOOD PRESSURE: 58 MMHG | TEMPERATURE: 98 F | RESPIRATION RATE: 17 BRPM

## 2024-10-03 LAB
ALBUMIN SERPL ELPH-MCNC: 3.9 G/DL — SIGNIFICANT CHANGE UP (ref 3.3–5)
ALP SERPL-CCNC: 119 U/L — SIGNIFICANT CHANGE UP (ref 40–120)
ALT FLD-CCNC: 14 U/L — SIGNIFICANT CHANGE UP (ref 4–33)
ANION GAP SERPL CALC-SCNC: 12 MMOL/L — SIGNIFICANT CHANGE UP (ref 7–14)
APTT BLD: 56.4 SEC — HIGH (ref 24.5–35.6)
AST SERPL-CCNC: 16 U/L — SIGNIFICANT CHANGE UP (ref 4–32)
BASOPHILS # BLD AUTO: 0.03 K/UL — SIGNIFICANT CHANGE UP (ref 0–0.2)
BASOPHILS NFR BLD AUTO: 0.4 % — SIGNIFICANT CHANGE UP (ref 0–2)
BILIRUB SERPL-MCNC: 0.4 MG/DL — SIGNIFICANT CHANGE UP (ref 0.2–1.2)
BLOOD GAS VENOUS COMPREHENSIVE RESULT: SIGNIFICANT CHANGE UP
BUN SERPL-MCNC: 14 MG/DL — SIGNIFICANT CHANGE UP (ref 7–23)
CALCIUM SERPL-MCNC: 9.6 MG/DL — SIGNIFICANT CHANGE UP (ref 8.4–10.5)
CHLORIDE SERPL-SCNC: 100 MMOL/L — SIGNIFICANT CHANGE UP (ref 98–107)
CO2 SERPL-SCNC: 25 MMOL/L — SIGNIFICANT CHANGE UP (ref 22–31)
CREAT SERPL-MCNC: 0.43 MG/DL — LOW (ref 0.5–1.3)
EGFR: 109 ML/MIN/1.73M2 — SIGNIFICANT CHANGE UP
EGFR: 109 ML/MIN/1.73M2 — SIGNIFICANT CHANGE UP
EOSINOPHIL # BLD AUTO: 0.16 K/UL — SIGNIFICANT CHANGE UP (ref 0–0.5)
EOSINOPHIL NFR BLD AUTO: 1.9 % — SIGNIFICANT CHANGE UP (ref 0–6)
GLUCOSE SERPL-MCNC: 316 MG/DL — HIGH (ref 70–99)
HCT VFR BLD CALC: 33.6 % — LOW (ref 34.5–45)
HGB BLD-MCNC: 11.4 G/DL — LOW (ref 11.5–15.5)
IANC: 4.91 K/UL — SIGNIFICANT CHANGE UP (ref 1.8–7.4)
IMM GRANULOCYTES NFR BLD AUTO: 0.4 % — SIGNIFICANT CHANGE UP (ref 0–0.9)
INR BLD: 0.98 RATIO — SIGNIFICANT CHANGE UP (ref 0.85–1.16)
LYMPHOCYTES # BLD AUTO: 2.77 K/UL — SIGNIFICANT CHANGE UP (ref 1–3.3)
LYMPHOCYTES # BLD AUTO: 33.1 % — SIGNIFICANT CHANGE UP (ref 13–44)
MCHC RBC-ENTMCNC: 30 PG — SIGNIFICANT CHANGE UP (ref 27–34)
MCHC RBC-ENTMCNC: 33.9 GM/DL — SIGNIFICANT CHANGE UP (ref 32–36)
MCV RBC AUTO: 88.4 FL — SIGNIFICANT CHANGE UP (ref 80–100)
MONOCYTES # BLD AUTO: 0.48 K/UL — SIGNIFICANT CHANGE UP (ref 0–0.9)
MONOCYTES NFR BLD AUTO: 5.7 % — SIGNIFICANT CHANGE UP (ref 2–14)
NEUTROPHILS # BLD AUTO: 4.91 K/UL — SIGNIFICANT CHANGE UP (ref 1.8–7.4)
NEUTROPHILS NFR BLD AUTO: 58.5 % — SIGNIFICANT CHANGE UP (ref 43–77)
NRBC # BLD AUTO: 0 K/UL — SIGNIFICANT CHANGE UP (ref 0–0)
NRBC # BLD: 0 /100 WBCS — SIGNIFICANT CHANGE UP (ref 0–0)
NRBC # FLD: 0 K/UL — SIGNIFICANT CHANGE UP (ref 0–0)
NRBC BLD-RTO: 0 /100 WBCS — SIGNIFICANT CHANGE UP (ref 0–0)
PLATELET # BLD AUTO: 264 K/UL — SIGNIFICANT CHANGE UP (ref 150–400)
POTASSIUM SERPL-MCNC: 3.8 MMOL/L — SIGNIFICANT CHANGE UP (ref 3.5–5.3)
POTASSIUM SERPL-SCNC: 3.8 MMOL/L — SIGNIFICANT CHANGE UP (ref 3.5–5.3)
PROT SERPL-MCNC: 7.2 G/DL — SIGNIFICANT CHANGE UP (ref 6–8.3)
PROTHROM AB SERPL-ACNC: 11.7 SEC — SIGNIFICANT CHANGE UP (ref 9.9–13.4)
RBC # BLD: 3.8 M/UL — SIGNIFICANT CHANGE UP (ref 3.8–5.2)
RBC # FLD: 12 % — SIGNIFICANT CHANGE UP (ref 10.3–14.5)
SODIUM SERPL-SCNC: 137 MMOL/L — SIGNIFICANT CHANGE UP (ref 135–145)
WBC # BLD: 8.38 K/UL — SIGNIFICANT CHANGE UP (ref 3.8–10.5)
WBC # FLD AUTO: 8.38 K/UL — SIGNIFICANT CHANGE UP (ref 3.8–10.5)

## 2024-10-03 PROCEDURE — 93010 ELECTROCARDIOGRAM REPORT: CPT

## 2024-10-03 PROCEDURE — 99284 EMERGENCY DEPT VISIT MOD MDM: CPT

## 2024-10-03 PROCEDURE — 93970 EXTREMITY STUDY: CPT | Mod: 26

## 2024-10-03 RX ORDER — INSULIN LISPRO 100 U/ML
6 INJECTION, SOLUTION INTRAVENOUS; SUBCUTANEOUS ONCE
Refills: 0 | Status: COMPLETED | OUTPATIENT
Start: 2024-10-03 | End: 2024-10-03

## 2024-12-01 ENCOUNTER — INPATIENT (INPATIENT)
Facility: HOSPITAL | Age: 64
LOS: 4 days | Discharge: HOME HEALTH | End: 2024-12-06
Attending: STUDENT IN AN ORGANIZED HEALTH CARE EDUCATION/TRAINING PROGRAM | Admitting: STUDENT IN AN ORGANIZED HEALTH CARE EDUCATION/TRAINING PROGRAM
Payer: MEDICAID

## 2024-12-01 VITALS
OXYGEN SATURATION: 95 % | HEIGHT: 60 IN | RESPIRATION RATE: 24 BRPM | WEIGHT: 287.92 LBS | TEMPERATURE: 98 F | SYSTOLIC BLOOD PRESSURE: 104 MMHG | HEART RATE: 97 BPM | DIASTOLIC BLOOD PRESSURE: 77 MMHG

## 2024-12-01 DIAGNOSIS — A41.9 SEPSIS, UNSPECIFIED ORGANISM: ICD-10-CM

## 2024-12-01 DIAGNOSIS — J45.909 UNSPECIFIED ASTHMA, UNCOMPLICATED: ICD-10-CM

## 2024-12-01 DIAGNOSIS — Z29.9 ENCOUNTER FOR PROPHYLACTIC MEASURES, UNSPECIFIED: ICD-10-CM

## 2024-12-01 DIAGNOSIS — R73.9 HYPERGLYCEMIA, UNSPECIFIED: ICD-10-CM

## 2024-12-01 DIAGNOSIS — E11.65 TYPE 2 DIABETES MELLITUS WITH HYPERGLYCEMIA: ICD-10-CM

## 2024-12-01 LAB
A1C WITH ESTIMATED AVERAGE GLUCOSE RESULT: 13.4 % — HIGH (ref 4–5.6)
ADD ON TEST-SPECIMEN IN LAB: SIGNIFICANT CHANGE UP
ADD ON TEST-SPECIMEN IN LAB: SIGNIFICANT CHANGE UP
ALBUMIN SERPL ELPH-MCNC: 3.9 G/DL — SIGNIFICANT CHANGE UP (ref 3.3–5)
ALP SERPL-CCNC: 124 U/L — HIGH (ref 40–120)
ALT FLD-CCNC: 8 U/L — SIGNIFICANT CHANGE UP (ref 4–33)
ANION GAP SERPL CALC-SCNC: 14 MMOL/L — SIGNIFICANT CHANGE UP (ref 7–14)
APPEARANCE UR: CLEAR — SIGNIFICANT CHANGE UP
AST SERPL-CCNC: 12 U/L — SIGNIFICANT CHANGE UP (ref 4–32)
B-OH-BUTYR SERPL-SCNC: 0.2 MMOL/L — SIGNIFICANT CHANGE UP (ref 0–0.4)
BACTERIA # UR AUTO: ABNORMAL /HPF
BASOPHILS # BLD AUTO: 0.03 K/UL — SIGNIFICANT CHANGE UP (ref 0–0.2)
BASOPHILS NFR BLD AUTO: 0.2 % — SIGNIFICANT CHANGE UP (ref 0–2)
BILIRUB SERPL-MCNC: 0.5 MG/DL — SIGNIFICANT CHANGE UP (ref 0.2–1.2)
BILIRUB UR-MCNC: NEGATIVE — SIGNIFICANT CHANGE UP
BLOOD GAS VENOUS COMPREHENSIVE RESULT: SIGNIFICANT CHANGE UP
BUN SERPL-MCNC: 20 MG/DL — SIGNIFICANT CHANGE UP (ref 7–23)
CALCIUM SERPL-MCNC: 10.2 MG/DL — SIGNIFICANT CHANGE UP (ref 8.4–10.5)
CAST: 1 /LPF — SIGNIFICANT CHANGE UP (ref 0–4)
CHLORIDE SERPL-SCNC: 93 MMOL/L — LOW (ref 98–107)
CO2 SERPL-SCNC: 24 MMOL/L — SIGNIFICANT CHANGE UP (ref 22–31)
COLOR SPEC: YELLOW — SIGNIFICANT CHANGE UP
CREAT SERPL-MCNC: 0.69 MG/DL — SIGNIFICANT CHANGE UP (ref 0.5–1.3)
DIFF PNL FLD: NEGATIVE — SIGNIFICANT CHANGE UP
EGFR: 97 ML/MIN/1.73M2 — SIGNIFICANT CHANGE UP
EOSINOPHIL # BLD AUTO: 0.08 K/UL — SIGNIFICANT CHANGE UP (ref 0–0.5)
EOSINOPHIL NFR BLD AUTO: 0.5 % — SIGNIFICANT CHANGE UP (ref 0–6)
ESTIMATED AVERAGE GLUCOSE: 338 — SIGNIFICANT CHANGE UP
FLUAV AG NPH QL: SIGNIFICANT CHANGE UP
FLUBV AG NPH QL: SIGNIFICANT CHANGE UP
GLUCOSE BLDC GLUCOMTR-MCNC: 280 MG/DL — HIGH (ref 70–99)
GLUCOSE BLDC GLUCOMTR-MCNC: 391 MG/DL — HIGH (ref 70–99)
GLUCOSE BLDC GLUCOMTR-MCNC: 456 MG/DL — CRITICAL HIGH (ref 70–99)
GLUCOSE SERPL-MCNC: 630 MG/DL — CRITICAL HIGH (ref 70–99)
GLUCOSE UR QL: >=1000 MG/DL
HCT VFR BLD CALC: 38.6 % — SIGNIFICANT CHANGE UP (ref 34.5–45)
HGB BLD-MCNC: 13 G/DL — SIGNIFICANT CHANGE UP (ref 11.5–15.5)
IANC: 12.4 K/UL — HIGH (ref 1.8–7.4)
IMM GRANULOCYTES NFR BLD AUTO: 0.6 % — SIGNIFICANT CHANGE UP (ref 0–0.9)
KETONES UR-MCNC: NEGATIVE MG/DL — SIGNIFICANT CHANGE UP
LEUKOCYTE ESTERASE UR-ACNC: NEGATIVE — SIGNIFICANT CHANGE UP
LYMPHOCYTES # BLD AUTO: 19.6 % — SIGNIFICANT CHANGE UP (ref 13–44)
LYMPHOCYTES # BLD AUTO: 3.3 K/UL — SIGNIFICANT CHANGE UP (ref 1–3.3)
MCHC RBC-ENTMCNC: 30.3 PG — SIGNIFICANT CHANGE UP (ref 27–34)
MCHC RBC-ENTMCNC: 33.7 G/DL — SIGNIFICANT CHANGE UP (ref 32–36)
MCV RBC AUTO: 90 FL — SIGNIFICANT CHANGE UP (ref 80–100)
MONOCYTES # BLD AUTO: 0.9 K/UL — SIGNIFICANT CHANGE UP (ref 0–0.9)
MONOCYTES NFR BLD AUTO: 5.4 % — SIGNIFICANT CHANGE UP (ref 2–14)
NEUTROPHILS # BLD AUTO: 12.4 K/UL — HIGH (ref 1.8–7.4)
NEUTROPHILS NFR BLD AUTO: 73.7 % — SIGNIFICANT CHANGE UP (ref 43–77)
NITRITE UR-MCNC: POSITIVE
NRBC # BLD: 0 /100 WBCS — SIGNIFICANT CHANGE UP (ref 0–0)
NRBC # FLD: 0 K/UL — SIGNIFICANT CHANGE UP (ref 0–0)
PH UR: 6 — SIGNIFICANT CHANGE UP (ref 5–8)
PLATELET # BLD AUTO: 381 K/UL — SIGNIFICANT CHANGE UP (ref 150–400)
POTASSIUM SERPL-MCNC: 4.3 MMOL/L — SIGNIFICANT CHANGE UP (ref 3.5–5.3)
POTASSIUM SERPL-SCNC: 4.3 MMOL/L — SIGNIFICANT CHANGE UP (ref 3.5–5.3)
PROT SERPL-MCNC: 8 G/DL — SIGNIFICANT CHANGE UP (ref 6–8.3)
PROT UR-MCNC: NEGATIVE MG/DL — SIGNIFICANT CHANGE UP
RBC # BLD: 4.29 M/UL — SIGNIFICANT CHANGE UP (ref 3.8–5.2)
RBC # FLD: 12.2 % — SIGNIFICANT CHANGE UP (ref 10.3–14.5)
RBC CASTS # UR COMP ASSIST: 0 /HPF — SIGNIFICANT CHANGE UP (ref 0–4)
REVIEW: SIGNIFICANT CHANGE UP
RSV RNA NPH QL NAA+NON-PROBE: SIGNIFICANT CHANGE UP
SARS-COV-2 RNA SPEC QL NAA+PROBE: SIGNIFICANT CHANGE UP
SODIUM SERPL-SCNC: 131 MMOL/L — LOW (ref 135–145)
SP GR SPEC: 1.04 — HIGH (ref 1–1.03)
SQUAMOUS # UR AUTO: 2 /HPF — SIGNIFICANT CHANGE UP (ref 0–5)
UROBILINOGEN FLD QL: 0.2 MG/DL — SIGNIFICANT CHANGE UP (ref 0.2–1)
WBC # BLD: 16.81 K/UL — HIGH (ref 3.8–10.5)
WBC # FLD AUTO: 16.81 K/UL — HIGH (ref 3.8–10.5)
WBC UR QL: 3 /HPF — SIGNIFICANT CHANGE UP (ref 0–5)
YEAST-LIKE CELLS: PRESENT

## 2024-12-01 PROCEDURE — 71046 X-RAY EXAM CHEST 2 VIEWS: CPT | Mod: 26

## 2024-12-01 PROCEDURE — 99223 1ST HOSP IP/OBS HIGH 75: CPT

## 2024-12-01 PROCEDURE — 99285 EMERGENCY DEPT VISIT HI MDM: CPT

## 2024-12-01 RX ORDER — CEFTRIAXONE SODIUM 1 G
1000 VIAL (EA) INJECTION EVERY 24 HOURS
Refills: 0 | Status: DISCONTINUED | OUTPATIENT
Start: 2024-12-01 | End: 2024-12-01

## 2024-12-01 RX ORDER — SODIUM CHLORIDE 9 MG/ML
1000 INJECTION, SOLUTION INTRAMUSCULAR; INTRAVENOUS; SUBCUTANEOUS ONCE
Refills: 0 | Status: COMPLETED | OUTPATIENT
Start: 2024-12-01 | End: 2024-12-01

## 2024-12-01 RX ORDER — SODIUM CHLORIDE 9 MG/ML
1000 INJECTION, SOLUTION INTRAMUSCULAR; INTRAVENOUS; SUBCUTANEOUS ONCE
Refills: 0 | Status: DISCONTINUED | OUTPATIENT
Start: 2024-12-01 | End: 2024-12-01

## 2024-12-01 RX ORDER — PIPERACILLIN SODIUM AND TAZOBACTAM SODIUM 4; .5 G/20ML; G/20ML
3.38 INJECTION, POWDER, LYOPHILIZED, FOR SOLUTION INTRAVENOUS EVERY 8 HOURS
Refills: 0 | Status: DISCONTINUED | OUTPATIENT
Start: 2024-12-02 | End: 2024-12-05

## 2024-12-01 RX ORDER — PIPERACILLIN SODIUM AND TAZOBACTAM SODIUM 4; .5 G/20ML; G/20ML
3.38 INJECTION, POWDER, LYOPHILIZED, FOR SOLUTION INTRAVENOUS ONCE
Refills: 0 | Status: COMPLETED | OUTPATIENT
Start: 2024-12-02 | End: 2024-12-02

## 2024-12-01 RX ORDER — INSULIN GLARGINE 100 [IU]/ML
26 INJECTION, SOLUTION SUBCUTANEOUS AT BEDTIME
Refills: 0 | Status: DISCONTINUED | OUTPATIENT
Start: 2024-12-02 | End: 2024-12-03

## 2024-12-01 RX ORDER — VANCOMYCIN HCL 900 MCG/MG
2000 POWDER (GRAM) MISCELLANEOUS EVERY 12 HOURS
Refills: 0 | Status: DISCONTINUED | OUTPATIENT
Start: 2024-12-01 | End: 2024-12-02

## 2024-12-01 RX ORDER — ACETAMINOPHEN 500MG 500 MG/1
1000 TABLET, COATED ORAL ONCE
Refills: 0 | Status: COMPLETED | OUTPATIENT
Start: 2024-12-01 | End: 2024-12-01

## 2024-12-01 RX ORDER — ACETAMINOPHEN 500MG 500 MG/1
650 TABLET, COATED ORAL EVERY 6 HOURS
Refills: 0 | Status: DISCONTINUED | OUTPATIENT
Start: 2024-12-01 | End: 2024-12-06

## 2024-12-01 RX ORDER — 0.9 % SODIUM CHLORIDE 0.9 %
1000 INTRAVENOUS SOLUTION INTRAVENOUS
Refills: 0 | Status: DISCONTINUED | OUTPATIENT
Start: 2024-12-01 | End: 2024-12-06

## 2024-12-01 RX ORDER — ALBUTEROL 90 MCG
2.5 AEROSOL (GRAM) INHALATION ONCE
Refills: 0 | Status: COMPLETED | OUTPATIENT
Start: 2024-12-01 | End: 2024-12-01

## 2024-12-01 RX ORDER — INSULIN GLARGINE 100 [IU]/ML
26 INJECTION, SOLUTION SUBCUTANEOUS ONCE
Refills: 0 | Status: COMPLETED | OUTPATIENT
Start: 2024-12-01 | End: 2024-12-01

## 2024-12-01 RX ORDER — PIPERACILLIN SODIUM AND TAZOBACTAM SODIUM 4; .5 G/20ML; G/20ML
3.38 INJECTION, POWDER, LYOPHILIZED, FOR SOLUTION INTRAVENOUS ONCE
Refills: 0 | Status: COMPLETED | OUTPATIENT
Start: 2024-12-01 | End: 2024-12-01

## 2024-12-01 RX ORDER — GLUCAGON INJECTION, SOLUTION 0.5 MG/.1ML
1 INJECTION, SOLUTION SUBCUTANEOUS ONCE
Refills: 0 | Status: DISCONTINUED | OUTPATIENT
Start: 2024-12-01 | End: 2024-12-06

## 2024-12-01 RX ORDER — ACETAMINOPHEN, DIPHENHYDRAMINE HCL, PHENYLEPHRINE HCL 325; 25; 5 MG/1; MG/1; MG/1
3 TABLET ORAL AT BEDTIME
Refills: 0 | Status: DISCONTINUED | OUTPATIENT
Start: 2024-12-01 | End: 2024-12-06

## 2024-12-01 RX ADMIN — Medication 100 MILLIGRAM(S): at 21:55

## 2024-12-01 RX ADMIN — PIPERACILLIN SODIUM AND TAZOBACTAM SODIUM 200 GRAM(S): 4; .5 INJECTION, POWDER, LYOPHILIZED, FOR SOLUTION INTRAVENOUS at 23:29

## 2024-12-01 RX ADMIN — Medication 2.5 MILLIGRAM(S): at 14:41

## 2024-12-01 RX ADMIN — Medication 30 UNIT(S): at 14:35

## 2024-12-01 RX ADMIN — SODIUM CHLORIDE 1000 MILLILITER(S): 9 INJECTION, SOLUTION INTRAMUSCULAR; INTRAVENOUS; SUBCUTANEOUS at 21:53

## 2024-12-01 RX ADMIN — SODIUM CHLORIDE 1000 MILLILITER(S): 9 INJECTION, SOLUTION INTRAMUSCULAR; INTRAVENOUS; SUBCUTANEOUS at 13:18

## 2024-12-01 RX ADMIN — Medication 4 MILLIGRAM(S): at 16:15

## 2024-12-01 RX ADMIN — INSULIN GLARGINE 26 UNIT(S): 100 INJECTION, SOLUTION SUBCUTANEOUS at 18:38

## 2024-12-01 RX ADMIN — ACETAMINOPHEN 500MG 400 MILLIGRAM(S): 500 TABLET, COATED ORAL at 21:55

## 2024-12-01 RX ADMIN — Medication 3: at 22:40

## 2024-12-01 NOTE — CHART NOTE - NSCHARTNOTEFT_GEN_A_CORE
BRUNO PARKER  [id number]  [facility] 02    The patient is a 64yFemale with PMH of DM, HTN, HLD, asthma who presented to the hospital with leg pain, noted to be profoundly hyperglycemic  Endocrinology consulted for hyperglycemia    #Uncontrolled Type 2 Diabetes Mellitus  - - home regimen: novolog 30 units TIDAC, Lantus 26 units qhs  - eGFR: 97 mL/min/1.73m2 (12-01-24)  - Weight (kg): 130.6 (12-01-24)  - glucose elevated, no evidence of DKA on labs    PLAN:  - 26 units Lantus ordered STAT, agreed  - Recommend IV fluids, NPO, low dose Admelog correction q4h until glucose is under 300  - one FS glucose is under 300 can start diet, recommend Admelog 24 units TIDAC when eating  - Recommend moderate dose Admelog correction scale TID before meals and separate low dose scale QHS  - Please check FSG before meals and QHS, or q6h while NPO  - Inpatient glucose goals: <140 pre-meal, <180 random  - consistent carb diet when eating    Full consult to follow in the AM.    Albino Carcamo,  PGY-4  Endocrine Fellow  For nonurgent matters, please email [email or web address] or [email or web address]. For urgent matters only, please call answering service at [phone] (weekdays), [phone] (nights/weekends).

## 2024-12-01 NOTE — H&P ADULT - HISTORY OF PRESENT ILLNESS
63 y/o female pmh htn, dm, asthma c/o cough and leg swelling x 1 month, with urinary frequency x3 weeks.    In ED, vitals T 97.5, HR 97, /77, RR 24, O2 sat 95% on RA  Labs significant for: wbc 16.81, glucose 630, a1c 13.4, U/A positive for infection  EKG personally reviewed:  CXR: prelim radiology read: Right basilar hazy opacity  ED management: endo consulted. lispro 30 units x1, IV morphine 4 mg x1, lantus 26 units x1, 1L NS, endo consulted Pt is a 63 y/o female pmh htn, dm, asthma presents w/ complaint of cough x1 month, right foot pain/swelling x3 weeks and urinary frequency x3 weeks. Pt states symptoms of cough started about 1 month ago, she went to urgent care and was prescribed course of steroids and doxycycline which she completed. She states her symptoms of cough never went away nor did she feel better. She states she has redness, swelling of her right foot, she denies any drainage but feels like there is fluid there and feels like it's going to burst. She denies any trauma or injury to her foot but states she suffers for neuropathy. She also has been complaining of increased urinary frequency, stating that since she is coughing so hard she will sometimes urinate on herself. She denies any fevers, chest pain, abd pain, diarrhea, or vomiting. She states she is compliant with her insulin.     In ED, vitals T 97.5, HR 97, /77, RR 24, O2 sat 95% on RA  Labs significant for: wbc 16.81, glucose 630, a1c 13.4, U/A positive for infection  EKG personally reviewed: NSR, 1st degree AV block, no acute ischemic changes  CXR: prelim radiology read: Right basilar hazy opacity  ED management: endo consulted. lispro 30 units x1, IV morphine 4 mg x1, lantus 26 units x1, 1L NS, endo consulted

## 2024-12-01 NOTE — PATIENT PROFILE ADULT - FUNCTIONAL ASSESSMENT - BASIC MOBILITY 6.
2-calculated by average/Not able to assess (calculate score using [facility] averaging method)

## 2024-12-01 NOTE — ED PROVIDER NOTE - CLINICAL SUMMARY MEDICAL DECISION MAKING FREE TEXT BOX
63 y/o female pmh htn, hld, dm, asthma c/o cough and leg swelling/pain x1 month with urinary frequency and vomiting. pt with FS >500 in triage, Pt is well appearing but uncomfortable, mild tachypnea and tachycardia after walking from the restroom, b/l upper lobe wheezing, no murmur, abd soft nt nd, no neuro deficits, b/l LE swelling, non pitting, no calf tenderness, no rash- concern for possible DKA with elevated glucose, pt admits to not taking her insulin this AM, concern for PNA vs viral illness vs ACS vs CHF- will check labs, ekg, cxr, fluids, insulin, likely admit

## 2024-12-01 NOTE — ED ADULT TRIAGE NOTE - CHIEF COMPLAINT QUOTE
patient states" I am coughing since October , my feet are swollen since 2 weeks and I feel SOB " h/o DM, edema to feet. FS high

## 2024-12-01 NOTE — H&P ADULT - ASSESSMENT
65 y/o female pmh htn, dm, asthma c/o cough and leg swelling x 1 month, with urinary frequency x3 weeks. Pt admitted for uncontrolled DM w/ hyperglycemia and UTI.  Pt is a 63 y/o female pmh htn, dm, asthma presents w/ complaint of cough x1 month, right foot pain/swelling x3 weeks and urinary frequency x3 weeks. Pt admitted for uncontrolled DM w/ hyperglycemia and sepsis 2/2 UTI, pna and cellulitis.

## 2024-12-01 NOTE — PATIENT PROFILE ADULT - FALL HARM RISK - HARM RISK INTERVENTIONS
Assistance with ambulation/Assistance OOB with selected safe patient handling equipment/Communicate Risk of Fall with Harm to all staff/Discuss with provider need for PT consult/Monitor gait and stability/Provide patient with walking aids - walker, cane, crutches/Reinforce activity limits and safety measures with patient and family/Tailored Fall Risk Interventions/Use of alarms - bed, chair and/or voice tab/Visual Cue: Yellow wristband and red socks/Bed in lowest position, wheels locked, appropriate side rails in place/Call bell, personal items and telephone in reach/Instruct patient to call for assistance before getting out of bed or chair/Non-slip footwear when patient is out of bed/[place] to call system/Physically safe environment - no spills, clutter or unnecessary equipment/Purposeful Proactive Rounding/Room/bathroom lighting operational, light cord in reach

## 2024-12-01 NOTE — H&P ADULT - PROBLEM SELECTOR PLAN 3
Pt's right foot w/ circumferential area of erythema, warmth, and fluctuance.   - f/u CT to r/o abscess  - c/w IV vanc

## 2024-12-01 NOTE — ED ADULT NURSE NOTE - NSFALLRISKINTERV_ED_ALL_ED

## 2024-12-01 NOTE — H&P ADULT - PROBLEM SELECTOR PLAN 2
likely in setting of uti, right foot cellulitis vs abscess, and possible pna - right lower opacity on CXR, pt did complete course of doxy 3-4 weeks ago.  - start IV vanc and zosyn for now  - f/u ucx, bcx

## 2024-12-01 NOTE — H&P ADULT - PROBLEM SELECTOR PLAN 1
A1C 13.4. No evidence of dka. Seen by endocrine, recs appreciated. Pt states she is on home lantus 28 units QHS and lispro 30 units TID w/ meals.  - glucose <300, will start diet and lantus 26 units qhs, lispro 24 units TID, mod ISS and low ISS at bedtime.  - f/u official endocrine recs  - c/w gabapentin for neuropathy

## 2024-12-01 NOTE — ED PROVIDER NOTE - OBJECTIVE STATEMENT
65 y/o female pmh htn, dm, asthma c/o cough and leg swelling x 1 month, with urinary frequency x3 weeks. Pt went to the urgent care 3 weeks ago and was given abx and steroids with no relief. Pt now c/o worsening lower ext pain/swelling, cough. Pt also admits to several episode of emesis. Pt denies chest pain, sob, diarrhea, dysuria, numbness, tingling, weakness, dizziness, syncope, fever or chills.

## 2024-12-01 NOTE — H&P ADULT - PROBLEM SELECTOR PLAN 5
Pt w/ complaint of cough x1 month, no resolution in symptoms after course of doxycyline 3-4 weeks ago. CXR w/ right basilar opacity  - c/w IV vanc and zosyn  - f/u urine legionella  - mrsa

## 2024-12-01 NOTE — H&P ADULT - NSHPLABSRESULTS_GEN_ALL_CORE
.  LABS:                         13.0   16.81 )-----------( 381      ( 01 Dec 2024 13:34 )             38.6         131[L]  |  93[L]  |  20  ----------------------------<  630[HH]  4.3   |  24  |  0.69    Ca    10.2      01 Dec 2024 13:11    TPro  8.0  /  Alb  3.9  /  TBili  0.5  /  DBili  x   /  AST  12  /  ALT  8   /  AlkPhos  124[H]  12      Urinalysis Basic - ( 01 Dec 2024 18:30 )    Color: Yellow / Appearance: Clear / S.039 / pH: x  Gluc: x / Ketone: Negative mg/dL  / Bili: Negative / Urobili: 0.2 mg/dL   Blood: x / Protein: Negative mg/dL / Nitrite: Positive   Leuk Esterase: Negative / RBC: 0 /HPF / WBC 3 /HPF   Sq Epi: x / Non Sq Epi: 2 /HPF / Bacteria: Many /HPF                RADIOLOGY, EKG & ADDITIONAL TESTS: Reviewed.

## 2024-12-01 NOTE — ED ADULT NURSE REASSESSMENT NOTE - NS ED NURSE REASSESS COMMENT FT1
report received from BUBBA Golden . Pt A&Ox4, resting in stretcher. pt endorsing pain to RLE, ACP made aware. Pt denies chest pain, headache, dizziness, SOB, N/V/D, chills verbalized. respirations even and unlabored. safety maintained, call bell in reach

## 2024-12-01 NOTE — ED ADULT NURSE REASSESSMENT NOTE - NS ED NURSE REASSESS COMMENT FT1
pt remainns alert,oriented x3. pharmacy called for insulin. meds given as ordered. iv infusing as ordered. will continue to monitor

## 2024-12-01 NOTE — H&P ADULT - NSHPREVIEWOFSYSTEMS_GEN_ALL_CORE
REVIEW OF SYSTEMS:    CONSTITUTIONAL: No weakness, fevers or chills  EYES/ENT: No visual changes;  No vertigo or throat pain   NECK: No pain or stiffness  RESPIRATORY: No cough, wheezing, hemoptysis; No shortness of breath  CARDIOVASCULAR: No chest pain or palpitations  GASTROINTESTINAL: No abdominal or epigastric pain. No nausea, vomiting, or hematemesis; No diarrhea or constipation. No melena or hematochezia.  GENITOURINARY: No dysuria, frequency or hematuria  NEUROLOGICAL: No numbness or weakness  SKIN: No itching, rashes REVIEW OF SYSTEMS:    CONSTITUTIONAL: No weakness, fevers or chills  EYES/ENT: No visual changes;  No vertigo or throat pain   NECK: No pain or stiffness  RESPIRATORY: + cough, No wheezing, hemoptysis; No shortness of breath  CARDIOVASCULAR: No chest pain or palpitations  GASTROINTESTINAL: No abdominal or epigastric pain. No nausea, vomiting, or hematemesis; No diarrhea or constipation. No melena or hematochezia.  GENITOURINARY: + dysuria, frequency. No hematuria  MSK: + right foot pain and swelling  NEUROLOGICAL: No numbness or weakness  SKIN: No itching, rashes

## 2024-12-01 NOTE — H&P ADULT - TIME BILLING
I have spent a total of 80 minutes time spent to prepare to see the patient, obtaining and reviewing history, physical examination, explaining the diagnosis, prognosis and treatment plan with the patient/family/caregiver. I also have spent the time ordering studies and testing, interpreting results, medicine reconciliation, and documentation as above.

## 2024-12-01 NOTE — ED PROVIDER NOTE - CARE PLAN
Principal Discharge DX:	Hyperglycemia  Secondary Diagnosis:	Cough  Secondary Diagnosis:	Leg pain   1

## 2024-12-01 NOTE — ED PROVIDER NOTE - ATTENDING APP SHARED VISIT CONTRIBUTION OF CARE
Attending note:   After face to face evaluation of this patient, I concur with above noted hx, pe, and care plan for this patient.  Castillo: 64-year-old female with hypertension type 2 diabetes and asthma.  Patient presents to the ED with 1 month of cough and leg swelling.  Patient also has complaints of urinary frequency and vomiting few episodes.  Patient was seen at urgent care 3 weeks ago and given antibiotics and steroids with no relief.  Patient denies any fevers or chills and has no PND orthopnea.  Patient does however note leg swelling has been worsening over the last few days to the point that has become unbearable to walk.  On exam patient appears comfortable at rest but with minimal exertion patient becomes extremely tachypneic and tachycardic.  Patient has notable wheezing in the upper lung fields and 2+ pitting edema.  However rest of physical exam is unremarkable as noted above.  Patient is also noted to have extremely elevated fingersticks especially in triage and states that they have been creeping up recently.  Patient likely has poor glycemic control.  Differential diagnosis is not limited to but includes pneumonia, CHF, exacerbation asthma exacerbation.  Will also check for UTI.  Given significant dyspnea with minimal exertion will likely require admission.  May require diuresis if CHF.  If asthma will reassess after nebulizer treatments.

## 2024-12-01 NOTE — H&P ADULT - NSHPPHYSICALEXAM_GEN_ALL_CORE
VITAL SIGNS:  T(C): 37.9 (12-01-24 @ 20:59), Max: 37.9 (12-01-24 @ 20:59)  T(F): 100.2 (12-01-24 @ 20:59), Max: 100.2 (12-01-24 @ 20:59)  HR: 87 (12-01-24 @ 20:59) (87 - 97)  BP: 146/73 (12-01-24 @ 20:59) (104/77 - 146/73)  BP(mean): --  RR: 17 (12-01-24 @ 20:59) (17 - 24)  SpO2: 98% (12-01-24 @ 20:59) (95% - 99%)  Wt(kg): --    PHYSICAL EXAM:    Constitutional: resting comfortably in bed; NAD  Head: NC/AT  Eyes: PERRL, EOMI, anicteric sclera  ENT: no nasal discharge; uvula midline, no oropharyngeal erythema or exudates; MMM  Neck: supple  Respiratory: CTA B/L; no W/R/R, no retractions  Cardiac: +S1/S2; RRR; no M/R/G  Gastrointestinal: abdomen soft, NT/ND; no rebound or guarding; +BSx4  Back: spine midline, no bony tenderness  Extremities: WWP, no clubbing or cyanosis; no peripheral edema  Musculoskeletal: NROM x4; no joint swelling, tenderness or erythema  Vascular: distal pulses intact  Dermatologic: skin warm, dry and intact; no rashes  Lymphatic: no submandibular or cervical LAD  Neurologic: AAOx3; moves all 4 extremities  Psychiatric: affect and characteristics of appearance, verbalizations, behaviors are appropriate VITAL SIGNS:  T(C): 37.9 (12-01-24 @ 20:59), Max: 37.9 (12-01-24 @ 20:59)  T(F): 100.2 (12-01-24 @ 20:59), Max: 100.2 (12-01-24 @ 20:59)  HR: 87 (12-01-24 @ 20:59) (87 - 97)  BP: 146/73 (12-01-24 @ 20:59) (104/77 - 146/73)  BP(mean): --  RR: 17 (12-01-24 @ 20:59) (17 - 24)  SpO2: 98% (12-01-24 @ 20:59) (95% - 99%)  Wt(kg): --    PHYSICAL EXAM:    Constitutional: obese female, resting comfortably in bed; NAD  Head: NC/AT  Eyes: PERRL, EOMI, anicteric sclera  ENT: no nasal discharge; uvula midline, no oropharyngeal erythema or exudates; MMM  Neck: supple  Respiratory: CTA B/L; no W/R/R, no retractions  Cardiac: +S1/S2; RRR; no M/R/G  Gastrointestinal: abdomen soft, NT/ND; no rebound or guarding; +BSx4  Back: spine midline, no bony tenderness  Extremities: WWP, no clubbing or cyanosis; no peripheral edema  Musculoskeletal: no joint swelling, tenderness or erythema  Vascular: distal pulses intact  Dermatologic: skin warm, dry and intact; Dorsum of right foot with 4 cm circular area of fluctuance w/ surrounding erythema and warmth.  Lymphatic: no submandibular or cervical LAD  Neurologic: AAOx3; moves all 4 extremities  Psychiatric: affect and characteristics of appearance, verbalizations, behaviors are appropriate

## 2024-12-01 NOTE — ED ADULT NURSE NOTE - OBJECTIVE STATEMENT
pt reporting to the ED for cough X 1 month, bilateral lower extrmity swelling and pain X weeks. FS high in triage. Pt is AOX4. Pt denies chest pain. Reports exertional SOB. . PT respirations even an unlabored. placed on CM, NSR, spo2 maintaining on room air. Pt reports subjective fever, no temp obtained at home. seen at  and RVP negative. repots vomiting and nausea. . Pt denies abdominal pain, dysuria, hematuria. 20 g iv placed in L ac.

## 2024-12-01 NOTE — ED PROVIDER NOTE - NS ED ATTENDING STATEMENT MOD
From: Janey Sanchez  To: Sukhjinder Thomson MD  Sent: 6/13/2021 3:54 PM EDT  Subject: Prescription Question    Hi. Would you be able to call in a 90 supply of my humalog insulin in the vials for my pump?      Thank you,    Janey Sanchez  
This was a shared visit with the ESE. I reviewed and verified the documentation.

## 2024-12-02 ENCOUNTER — RESULT REVIEW (OUTPATIENT)
Age: 64
End: 2024-12-02

## 2024-12-02 ENCOUNTER — TRANSCRIPTION ENCOUNTER (OUTPATIENT)
Age: 64
End: 2024-12-02

## 2024-12-02 DIAGNOSIS — J18.9 PNEUMONIA, UNSPECIFIED ORGANISM: ICD-10-CM

## 2024-12-02 DIAGNOSIS — A41.9 SEPSIS, UNSPECIFIED ORGANISM: ICD-10-CM

## 2024-12-02 DIAGNOSIS — L03.115 CELLULITIS OF RIGHT LOWER LIMB: ICD-10-CM

## 2024-12-02 DIAGNOSIS — E11.65 TYPE 2 DIABETES MELLITUS WITH HYPERGLYCEMIA: ICD-10-CM

## 2024-12-02 DIAGNOSIS — I10 ESSENTIAL (PRIMARY) HYPERTENSION: ICD-10-CM

## 2024-12-02 DIAGNOSIS — E78.49 OTHER HYPERLIPIDEMIA: ICD-10-CM

## 2024-12-02 DIAGNOSIS — Z79.899 OTHER LONG TERM (CURRENT) DRUG THERAPY: ICD-10-CM

## 2024-12-02 DIAGNOSIS — N39.0 URINARY TRACT INFECTION, SITE NOT SPECIFIED: ICD-10-CM

## 2024-12-02 LAB
ADD ON TEST-SPECIMEN IN LAB: SIGNIFICANT CHANGE UP
ADD ON TEST-SPECIMEN IN LAB: SIGNIFICANT CHANGE UP
ANION GAP SERPL CALC-SCNC: 10 MMOL/L — SIGNIFICANT CHANGE UP (ref 7–14)
BASOPHILS # BLD AUTO: 0.02 K/UL — SIGNIFICANT CHANGE UP (ref 0–0.2)
BASOPHILS NFR BLD AUTO: 0.1 % — SIGNIFICANT CHANGE UP (ref 0–2)
BUN SERPL-MCNC: 11 MG/DL — SIGNIFICANT CHANGE UP (ref 7–23)
CALCIUM SERPL-MCNC: 8.9 MG/DL — SIGNIFICANT CHANGE UP (ref 8.4–10.5)
CHLORIDE SERPL-SCNC: 102 MMOL/L — SIGNIFICANT CHANGE UP (ref 98–107)
CO2 SERPL-SCNC: 26 MMOL/L — SIGNIFICANT CHANGE UP (ref 22–31)
CREAT SERPL-MCNC: 0.34 MG/DL — LOW (ref 0.5–1.3)
CRP SERPL-MCNC: 109.6 MG/L — HIGH
CRP SERPL-MCNC: 94.2 MG/L — HIGH
EGFR: 115 ML/MIN/1.73M2 — SIGNIFICANT CHANGE UP
EOSINOPHIL # BLD AUTO: 0.11 K/UL — SIGNIFICANT CHANGE UP (ref 0–0.5)
EOSINOPHIL NFR BLD AUTO: 0.8 % — SIGNIFICANT CHANGE UP (ref 0–6)
ERYTHROCYTE [SEDIMENTATION RATE] IN BLOOD: 101 MM/HR — HIGH (ref 4–25)
GLUCOSE BLDC GLUCOMTR-MCNC: 117 MG/DL — HIGH (ref 70–99)
GLUCOSE BLDC GLUCOMTR-MCNC: 121 MG/DL — HIGH (ref 70–99)
GLUCOSE BLDC GLUCOMTR-MCNC: 181 MG/DL — HIGH (ref 70–99)
GLUCOSE BLDC GLUCOMTR-MCNC: 220 MG/DL — HIGH (ref 70–99)
GLUCOSE SERPL-MCNC: 176 MG/DL — HIGH (ref 70–99)
GRAM STN FLD: SIGNIFICANT CHANGE UP
HCT VFR BLD CALC: 31.5 % — LOW (ref 34.5–45)
HGB BLD-MCNC: 10.3 G/DL — LOW (ref 11.5–15.5)
IANC: 10.6 K/UL — HIGH (ref 1.8–7.4)
IMM GRANULOCYTES NFR BLD AUTO: 0.5 % — SIGNIFICANT CHANGE UP (ref 0–0.9)
LYMPHOCYTES # BLD AUTO: 18.1 % — SIGNIFICANT CHANGE UP (ref 13–44)
LYMPHOCYTES # BLD AUTO: 2.59 K/UL — SIGNIFICANT CHANGE UP (ref 1–3.3)
MAGNESIUM SERPL-MCNC: 1.8 MG/DL — SIGNIFICANT CHANGE UP (ref 1.6–2.6)
MCHC RBC-ENTMCNC: 30.1 PG — SIGNIFICANT CHANGE UP (ref 27–34)
MCHC RBC-ENTMCNC: 32.7 G/DL — SIGNIFICANT CHANGE UP (ref 32–36)
MCV RBC AUTO: 92.1 FL — SIGNIFICANT CHANGE UP (ref 80–100)
MONOCYTES # BLD AUTO: 0.92 K/UL — HIGH (ref 0–0.9)
MONOCYTES NFR BLD AUTO: 6.4 % — SIGNIFICANT CHANGE UP (ref 2–14)
MRSA PCR RESULT.: SIGNIFICANT CHANGE UP
NEUTROPHILS # BLD AUTO: 10.6 K/UL — HIGH (ref 1.8–7.4)
NEUTROPHILS NFR BLD AUTO: 74.1 % — SIGNIFICANT CHANGE UP (ref 43–77)
NRBC # BLD: 0 /100 WBCS — SIGNIFICANT CHANGE UP (ref 0–0)
NRBC # FLD: 0 K/UL — SIGNIFICANT CHANGE UP (ref 0–0)
PHOSPHATE SERPL-MCNC: 2.7 MG/DL — SIGNIFICANT CHANGE UP (ref 2.5–4.5)
PLATELET # BLD AUTO: 315 K/UL — SIGNIFICANT CHANGE UP (ref 150–400)
POTASSIUM SERPL-MCNC: 3.6 MMOL/L — SIGNIFICANT CHANGE UP (ref 3.5–5.3)
POTASSIUM SERPL-SCNC: 3.6 MMOL/L — SIGNIFICANT CHANGE UP (ref 3.5–5.3)
RBC # BLD: 3.42 M/UL — LOW (ref 3.8–5.2)
RBC # FLD: 12.6 % — SIGNIFICANT CHANGE UP (ref 10.3–14.5)
S AUREUS DNA NOSE QL NAA+PROBE: SIGNIFICANT CHANGE UP
SODIUM SERPL-SCNC: 138 MMOL/L — SIGNIFICANT CHANGE UP (ref 135–145)
SPECIMEN SOURCE: SIGNIFICANT CHANGE UP
URATE SERPL-MCNC: 2.2 MG/DL — LOW (ref 2.5–7)
WBC # BLD: 14.31 K/UL — HIGH (ref 3.8–10.5)
WBC # FLD AUTO: 14.31 K/UL — HIGH (ref 3.8–10.5)

## 2024-12-02 PROCEDURE — 93923 UPR/LXTR ART STDY 3+ LVLS: CPT | Mod: 26

## 2024-12-02 PROCEDURE — 99254 IP/OBS CNSLTJ NEW/EST MOD 60: CPT | Mod: GC

## 2024-12-02 PROCEDURE — 99233 SBSQ HOSP IP/OBS HIGH 50: CPT

## 2024-12-02 PROCEDURE — 93922 UPR/L XTREMITY ART 2 LEVELS: CPT | Mod: 26,59

## 2024-12-02 PROCEDURE — 99255 IP/OBS CONSLTJ NEW/EST HI 80: CPT

## 2024-12-02 RX ORDER — ROSUVASTATIN CALCIUM 5 MG/1
0 TABLET, FILM COATED ORAL
Refills: 0 | DISCHARGE

## 2024-12-02 RX ORDER — COLCHICINE 0.6 MG
1.2 TABLET ORAL ONCE
Refills: 0 | Status: COMPLETED | OUTPATIENT
Start: 2024-12-02 | End: 2024-12-02

## 2024-12-02 RX ORDER — GABAPENTIN 300 MG/1
600 CAPSULE ORAL THREE TIMES A DAY
Refills: 0 | Status: DISCONTINUED | OUTPATIENT
Start: 2024-12-02 | End: 2024-12-06

## 2024-12-02 RX ORDER — KETOROLAC TROMETHAMINE 30 MG/ML
15 INJECTION INTRAMUSCULAR; INTRAVENOUS ONCE
Refills: 0 | Status: DISCONTINUED | OUTPATIENT
Start: 2024-12-02 | End: 2024-12-02

## 2024-12-02 RX ORDER — GABAPENTIN 300 MG/1
1 CAPSULE ORAL
Refills: 0 | DISCHARGE

## 2024-12-02 RX ORDER — VANCOMYCIN HCL 900 MCG/MG
1250 POWDER (GRAM) MISCELLANEOUS EVERY 12 HOURS
Refills: 0 | Status: DISCONTINUED | OUTPATIENT
Start: 2024-12-02 | End: 2024-12-03

## 2024-12-02 RX ORDER — BENZONATATE 100 MG/1
100 CAPSULE ORAL EVERY 8 HOURS
Refills: 0 | Status: DISCONTINUED | OUTPATIENT
Start: 2024-12-02 | End: 2024-12-06

## 2024-12-02 RX ORDER — ENOXAPARIN SODIUM 30 MG/.3ML
40 INJECTION SUBCUTANEOUS EVERY 12 HOURS
Refills: 0 | Status: DISCONTINUED | OUTPATIENT
Start: 2024-12-02 | End: 2024-12-06

## 2024-12-02 RX ADMIN — Medication 166.67 MILLIGRAM(S): at 12:18

## 2024-12-02 RX ADMIN — Medication 24 UNIT(S): at 08:44

## 2024-12-02 RX ADMIN — Medication 1.2 MILLIGRAM(S): at 18:10

## 2024-12-02 RX ADMIN — Medication 4 MILLIGRAM(S): at 07:04

## 2024-12-02 RX ADMIN — Medication 24 UNIT(S): at 12:14

## 2024-12-02 RX ADMIN — ENOXAPARIN SODIUM 40 MILLIGRAM(S): 30 INJECTION SUBCUTANEOUS at 18:19

## 2024-12-02 RX ADMIN — Medication 4 MILLIGRAM(S): at 01:40

## 2024-12-02 RX ADMIN — Medication 166.67 MILLIGRAM(S): at 01:56

## 2024-12-02 RX ADMIN — INSULIN GLARGINE 26 UNIT(S): 100 INJECTION, SOLUTION SUBCUTANEOUS at 23:00

## 2024-12-02 RX ADMIN — Medication 2: at 08:42

## 2024-12-02 RX ADMIN — ACETAMINOPHEN 500MG 650 MILLIGRAM(S): 500 TABLET, COATED ORAL at 11:29

## 2024-12-02 RX ADMIN — KETOROLAC TROMETHAMINE 15 MILLIGRAM(S): 30 INJECTION INTRAMUSCULAR; INTRAVENOUS at 13:11

## 2024-12-02 RX ADMIN — KETOROLAC TROMETHAMINE 15 MILLIGRAM(S): 30 INJECTION INTRAMUSCULAR; INTRAVENOUS at 12:11

## 2024-12-02 RX ADMIN — Medication 2 MILLIGRAM(S): at 18:10

## 2024-12-02 RX ADMIN — Medication 4 MILLIGRAM(S): at 00:41

## 2024-12-02 RX ADMIN — ENOXAPARIN SODIUM 40 MILLIGRAM(S): 30 INJECTION SUBCUTANEOUS at 05:25

## 2024-12-02 RX ADMIN — GABAPENTIN 600 MILLIGRAM(S): 300 CAPSULE ORAL at 14:47

## 2024-12-02 RX ADMIN — Medication 24 UNIT(S): at 18:08

## 2024-12-02 RX ADMIN — Medication 2 MILLIGRAM(S): at 18:54

## 2024-12-02 RX ADMIN — ACETAMINOPHEN 500MG 650 MILLIGRAM(S): 500 TABLET, COATED ORAL at 23:30

## 2024-12-02 RX ADMIN — ACETAMINOPHEN 500MG 650 MILLIGRAM(S): 500 TABLET, COATED ORAL at 22:56

## 2024-12-02 RX ADMIN — GABAPENTIN 600 MILLIGRAM(S): 300 CAPSULE ORAL at 22:56

## 2024-12-02 RX ADMIN — Medication 4 MILLIGRAM(S): at 23:21

## 2024-12-02 RX ADMIN — Medication 4 MILLIGRAM(S): at 06:07

## 2024-12-02 RX ADMIN — PIPERACILLIN SODIUM AND TAZOBACTAM SODIUM 25 GRAM(S): 4; .5 INJECTION, POWDER, LYOPHILIZED, FOR SOLUTION INTRAVENOUS at 16:43

## 2024-12-02 RX ADMIN — Medication 166.67 MILLIGRAM(S): at 23:19

## 2024-12-02 RX ADMIN — GABAPENTIN 600 MILLIGRAM(S): 300 CAPSULE ORAL at 05:25

## 2024-12-02 RX ADMIN — PIPERACILLIN SODIUM AND TAZOBACTAM SODIUM 25 GRAM(S): 4; .5 INJECTION, POWDER, LYOPHILIZED, FOR SOLUTION INTRAVENOUS at 04:54

## 2024-12-02 RX ADMIN — ACETAMINOPHEN 500MG 650 MILLIGRAM(S): 500 TABLET, COATED ORAL at 12:29

## 2024-12-02 RX ADMIN — PIPERACILLIN SODIUM AND TAZOBACTAM SODIUM 25 GRAM(S): 4; .5 INJECTION, POWDER, LYOPHILIZED, FOR SOLUTION INTRAVENOUS at 22:56

## 2024-12-02 RX ADMIN — PIPERACILLIN SODIUM AND TAZOBACTAM SODIUM 25 GRAM(S): 4; .5 INJECTION, POWDER, LYOPHILIZED, FOR SOLUTION INTRAVENOUS at 11:04

## 2024-12-02 NOTE — DISCHARGE NOTE PROVIDER - DETAILS OF MALNUTRITION DIAGNOSIS/DIAGNOSES
This patient has been assessed with a concern for Malnutrition and was treated during this hospitalization for the following Nutrition diagnosis/diagnoses:     -  12/03/2024: Morbid obesity (BMI > 40)

## 2024-12-02 NOTE — CONSULT NOTE ADULT - ASSESSMENT
64F s/p b/s right foot incision and drainage (DOS 12/1/24)   - Patient seen and evaluated   - Temp 100.4, WBC 14.31  - Right foot dorsal erythema with fluctuance. No open wounds bilaterally.   - After obtaining informed consent, 5cc 1:1 lidocaine 1% plain and 0.5% marcaine plain local block given prior to right foot incision and drainage down to subQ and not beyond, 10cc tophaceous gouty material/ purulence expressed. Patient tolerated procedure well.   - Ordered right foot xray  - Right foot wound cultured  - Recommend ID consult   - Recommend Rheum consult  - Demarcated erythema  - Recommend continue antibiotics  - Ordered Uric acid, ESR/CRP   - Pod plan local wound care pending right foot xray  - Wound care instruction and follow up information in discharge note provider   - Discussed with attending    64F s/p b/s right foot incision and drainage (DOS 12/1/24)   - Patient seen and evaluated   - Temp 100.4, WBC 14.31  - Right foot dorsal erythema with fluctuance. No open wounds bilaterally.   - After obtaining informed consent, 5cc 1:1 lidocaine 1% plain and 0.5% marcaine plain local block given prior to right foot incision and drainage down to subQ and not beyond, 10cc tophi/ purulence expressed. Patient tolerated procedure well.   - Ordered right foot xray  - Right foot wound cultured  - Recommend ID consult   - Recommend Rheum consult  - Demarcated erythema  - Recommend continue antibiotics  - Ordered Uric acid, ESR/CRP   - Pod plan local wound care pending right foot xray  - Wound care instruction and follow up information in discharge note provider   - Discussed with attending

## 2024-12-02 NOTE — DISCHARGE NOTE PROVIDER - HOSPITAL COURSE
Patient is a 63yo F with PMH significant for HTN, DM, and asthma who presented with cough of 1 month duration, right foot pain/swelling of 3 weeks, and urinary frequency of 3 weeks. She was admitted with sepsis from multiple sources (PNA, UTI, and right foot cellulitis), treated empirically with vancomycin/zosyn. Podiatry evaluated for the right foot dorsal cellulitis, with bedside I+D expressing pus and tophaceous material. Rheumatology and ID were consulted.     Endocrinology was consulted for her uncontrolled T2DM requiring long-term current use of insulin.    Patient is a 63yo F with PMH significant for HTN, DM, and asthma who presented with cough of 1 month duration, right foot pain/swelling of 3 weeks, and urinary frequency of 3 weeks. She was admitted with sepsis from multiple sources (PNA, UTI, and right foot cellulitis), treated empirically with vancomycin/zosyn. Podiatry evaluated for the right foot dorsal cellulitis, with bedside I+D expressing pus. Wound culture grew group B strep. ID followed for antibiotic adjustment.    Endocrinology was consulted for her uncontrolled T2DM requiring long-term current use of insulin, which was titrated upward. She was recommended to see an ophthalmologist after discharge and recommended to initiate a GLP-1 agonist.    On discharge, she will need to follow up with the podiatrist, endocrinologist, and her primary care physician.

## 2024-12-02 NOTE — CONSULT NOTE ADULT - ASSESSMENT
Pt is a 65 y/o female PMH of htn, uncontrolled Type 2 DM, asthma presents w/ complaint of cough x1 month, right foot pain/swelling x3 weeks and urinary frequency x3 weeks and found to have hyperglycemia and sepsis.       High level medical complexity with high level decision making.      1. Type 2 DM- uncontrolled with Hba1c 13.4%  -Blood glucose target is 100 to 180  -Encouraged a carbohydrate consistent diet and avoiding regular sodas  -Will continue Lantus 26 units SQ QHS  -Will continue Admelog 24 units QAC  -Will continue moderate correction scale QAC and QHS  -For discharge: she would like to follow up with our [facility]  -For discharge: will discharge on basal bolus regimen and consider GLP-1 RA  -Will need outpatient follow up with ophthalmology and urine alb/cr ratio     2. HTN-  -BP goal is < 130/80  -Management as per primary team      3. Hyperlipidemia-  -Recommend statin      Will follow closely    Frankie Crain DO  Attending Division of Endocrinology  [phone]

## 2024-12-02 NOTE — CONSULT NOTE ADULT - SUBJECTIVE AND OBJECTIVE BOX
Patient is a 64y old  Female who presents with a chief complaint of hyperglycemia, cough, leg pain (02 Dec 2024 13:38)    HPI:  Pt is a 63 y/o female pmh htn, dm, asthma presents w/ complaint of cough x1 month, right foot pain/swelling x3 weeks and urinary frequency x3 weeks. Pt states symptoms of cough started about 1 month ago, she went to urgent care and was prescribed course of steroids and doxycycline which she completed. She states her symptoms of cough never went away nor did she feel better. She states she has redness, swelling of her right foot, she denies any drainage but feels like there is fluid there and feels like it's going to burst. She denies any trauma or injury to her foot but states she suffers for neuropathy. She also has been complaining of increased urinary frequency, stating that since she is coughing so hard she will sometimes urinate on herself. She denies any fevers, chest pain, abd pain, diarrhea, or vomiting. She states she is compliant with her insulin. In ED, vitals T 97.5, HR 97, /77, RR 24, O2 sat 95% on RA. Pt now s/p I&D of the R. foot by podiatry         PAST MEDICAL & SURGICAL HISTORY:  Type 2 diabetes mellitus without complication, without long-term current use of insulin      HTN (hypertension)      Asthma      Asthma with bronchitis          Allergies  No Known Allergies    ANTIMICROBIALS (past 90 days)  MEDICATIONS  (STANDING):  cefTRIAXone   IVPB   100 mL/Hr IV Intermittent (12-01-24 @ 21:55)    piperacillin/tazobactam IVPB.   200 mL/Hr IV Intermittent (12-01-24 @ 23:29)    piperacillin/tazobactam IVPB.-   25 mL/Hr IV Intermittent (12-02-24 @ 04:54)    piperacillin/tazobactam IVPB.-   25 mL/Hr IV Intermittent (12-02-24 @ 11:04)    vancomycin  IVPB   166.67 mL/Hr IV Intermittent (12-02-24 @ 12:18)   166.67 mL/Hr IV Intermittent (12-02-24 @ 01:56)        piperacillin/tazobactam IVPB.- 3.375 once  piperacillin/tazobactam IVPB.. 3.375 every 8 hours  vancomycin  IVPB 1250 every 12 hours    MEDICATIONS  (STANDING):  acetaminophen     Tablet .. 650 every 6 hours PRN  benzonatate 100 every 8 hours PRN  colchicine 1.2 once  dextrose 50% Injectable 25 once  dextrose 50% Injectable 12.5 once  dextrose 50% Injectable 25 once  dextrose Oral Gel 15 once  enoxaparin Injectable 40 every 12 hours  gabapentin 600 three times a day  glucagon  Injectable 1 once  insulin glargine Injectable (LANTUS) 26 at bedtime  insulin lispro (ADMELOG) corrective regimen sliding scale  three times a day before meals  insulin lispro (ADMELOG) corrective regimen sliding scale  at bedtime  insulin lispro Injectable (ADMELOG) 24 three times a day before meals  melatonin 3 at bedtime PRN  morphine  - Injectable 2 every 6 hours PRN  morphine  - Injectable 4 every 6 hours PRN    SOCIAL HISTORY:       FAMILY HISTORY:    REVIEW OF SYSTEMS  [  ] ROS unobtainable because:    [  ] All other systems negative except as noted below:	    Constitutional:  [ ] fever [ ] chills  [ ] weight loss  [ ] weakness  Skin:  [ ] rash [ ] phlebitis	  Eyes: [ ] icterus [ ] pain  [ ] discharge	  ENMT: [ ] sore throat  [ ] thrush [ ] ulcers [ ] exudates  Respiratory: [ ] dyspnea [ ] hemoptysis [ ] cough [ ] sputum	  Cardiovascular:  [ ] chest pain [ ] palpitations [ ] edema	  Gastrointestinal:  [ ] nausea [ ] vomiting [ ] diarrhea [ ] constipation [ ] pain	  Genitourinary:  [ ] dysuria [ ] frequency [ ] hematuria [ ] discharge [ ] flank pain  [ ] incontinence  Musculoskeletal:  [ ] myalgias [ ] arthralgias [ ] arthritis  [ ] back pain  Neurological:  [ ] headache [ ] seizures  [ ] confusion/altered mental status  Psychiatric:  [ ] anxiety [ ] depression	  Hematology/Lymphatics:  [ ] lymphadenopathy  Endocrine:  [ ] adrenal [ ] thyroid  Allergic/Immunologic:	 [ ] transplant [ ] seasonal    Vital Signs Last 24 Hrs  T(F): 98 (12-02-24 @ 12:29), Max: 100.4 (12-02-24 @ 10:30)  Vital Signs Last 24 Hrs  HR: 90 (12-02-24 @ 10:30) (63 - 90)  BP: 117/89 (12-02-24 @ 10:30) (103/56 - 146/73)  RR: 18 (12-02-24 @ 10:30)  SpO2: 96% (12-02-24 @ 10:30) (96% - 98%)  Wt(kg): --    PHYSICAL EXAM:  Constitutional: non-toxic, no distress  HEAD/EYES: anicteric, no conjunctival injection  ENT:  supple, no thrush  Cardiovascular:   normal S1, S2, no murmur, no edema  Respiratory:  clear BS bilaterally, no wheezes, no rales  GI:  soft, non-tender, normal bowel sounds  :  no padilla, no CVA tenderness  Musculoskeletal:  no synovitis, normal ROM  Neurologic: awake and alert, normal strength, no focal findings  Skin:  no rash, no erythema, no phlebitis  Heme/Onc: no lymphadenopathy   Psychiatric:  awake, alert, appropriate mood                            10.3   14.31 )-----------( 315      ( 02 Dec 2024 07:19 )             31.5   12-02    138  |  102  |  11  ----------------------------<  176[H]  3.6   |  26  |  0.34[L]    Ca    8.9      02 Dec 2024 07:19  Phos  2.7     12-02  Mg     1.80     12-02    TPro  8.0  /  Alb  3.9  /  TBili  0.5  /  DBili  x   /  AST  12  /  ALT  8   /  AlkPhos  124[H]  12-01    Urinalysis Basic - ( 02 Dec 2024 07:19 )    Color: x / Appearance: x / SG: x / pH: x  Gluc: 176 mg/dL / Ketone: x  / Bili: x / Urobili: x   Blood: x / Protein: x / Nitrite: x   Leuk Esterase: x / RBC: x / WBC x   Sq Epi: x / Non Sq Epi: x / Bacteria: x    MICROBIOLOGY:              RADIOLOGY:  imaging below personally reviewed and agree with findings Patient is a 64y old  Female who presents with a chief complaint of hyperglycemia, cough, leg pain (02 Dec 2024 13:38)    HPI:  Pt is a 63 y/o female pmh htn, dm, asthma presents w/ complaint of cough x1 month, right foot pain/swelling x3 weeks and urinary frequency x3 weeks. Pt states symptoms of cough started about 1 month ago, she went to urgent care and was prescribed course of steroids and doxycycline which she completed. She states her symptoms of cough never went away nor did she feel better. She states she has redness, swelling of her right foot, she denies any drainage but feels like there is fluid there and feels like it's going to burst. She denies any trauma or injury to her foot but states she suffers for neuropathy. She also has been complaining of increased urinary frequency, stating that since she is coughing so hard she will sometimes urinate on herself. She denies any fevers, chest pain, abd pain, diarrhea, or vomiting. She states she is compliant with her insulin. In ED, vitals T 97.5, HR 97, /77, RR 24, O2 sat 95% on RA. Pt now s/p I&D of the R. foot by podiatry         PAST MEDICAL & SURGICAL HISTORY:  Type 2 diabetes mellitus without complication, without long-term current use of insulin      HTN (hypertension)      Asthma      Asthma with bronchitis          Allergies  No Known Allergies    ANTIMICROBIALS (past 90 days)  MEDICATIONS  (STANDING):  cefTRIAXone   IVPB   100 mL/Hr IV Intermittent (12-01-24 @ 21:55)    piperacillin/tazobactam IVPB.   200 mL/Hr IV Intermittent (12-01-24 @ 23:29)    piperacillin/tazobactam IVPB.-   25 mL/Hr IV Intermittent (12-02-24 @ 04:54)    piperacillin/tazobactam IVPB.-   25 mL/Hr IV Intermittent (12-02-24 @ 11:04)    vancomycin  IVPB   166.67 mL/Hr IV Intermittent (12-02-24 @ 12:18)   166.67 mL/Hr IV Intermittent (12-02-24 @ 01:56)        piperacillin/tazobactam IVPB.- 3.375 once  piperacillin/tazobactam IVPB.. 3.375 every 8 hours  vancomycin  IVPB 1250 every 12 hours    MEDICATIONS  (STANDING):  acetaminophen     Tablet .. 650 every 6 hours PRN  benzonatate 100 every 8 hours PRN  colchicine 1.2 once  dextrose 50% Injectable 25 once  dextrose 50% Injectable 12.5 once  dextrose 50% Injectable 25 once  dextrose Oral Gel 15 once  enoxaparin Injectable 40 every 12 hours  gabapentin 600 three times a day  glucagon  Injectable 1 once  insulin glargine Injectable (LANTUS) 26 at bedtime  insulin lispro (ADMELOG) corrective regimen sliding scale  three times a day before meals  insulin lispro (ADMELOG) corrective regimen sliding scale  at bedtime  insulin lispro Injectable (ADMELOG) 24 three times a day before meals  melatonin 3 at bedtime PRN  morphine  - Injectable 2 every 6 hours PRN  morphine  - Injectable 4 every 6 hours PRN    SOCIAL HISTORY: Denies any smoking or alcohol use. Pt does not work and is on disability.     FAMILY HISTORY: No pertinent family hx      REVIEW OF SYSTEMS:    CONSTITUTIONAL: No weakness. + fevers  EYES:  No visual changes, no eye pain   ENT: No vertigo or throat pain   NECK: No pain or stiffness  RESPIRATORY: No cough, wheezing, hemoptysis; No shortness of breath  CARDIOVASCULAR: No chest pain or palpitations  GASTROINTESTINAL: No abdominal or epigastric pain. No nausea, vomiting, or hematemesis; No diarrhea or constipation. No melena or hematochezia.  GENITOURINARY: No dysuria. + frequency  NEUROLOGICAL: No numbness or weakness  SKIN: R foot swelling with erythema   Psych: no anxiety or depression     Vital Signs Last 24 Hrs  T(F): 98 (12-02-24 @ 12:29), Max: 100.4 (12-02-24 @ 10:30)  Vital Signs Last 24 Hrs  HR: 90 (12-02-24 @ 10:30) (63 - 90)  BP: 117/89 (12-02-24 @ 10:30) (103/56 - 146/73)  RR: 18 (12-02-24 @ 10:30)  SpO2: 96% (12-02-24 @ 10:30) (96% - 98%)  Wt(kg): --    PHYSICAL EXAM:  Constitutional: non-toxic, no distress  HEAD/EYES: anicteric, no conjunctival injection  ENT:  supple, no thrush  Cardiovascular:   normal S1, S2, no murmur, no edema  Respiratory:  clear BS bilaterally, no wheezes, no rales  GI:  soft, non-tender, normal bowel sounds  :  no padilla, no CVA tenderness  Musculoskeletal:  R foot wrapped, dressing c/d/i   Neurologic: awake and alert  Skin: no phlebitis  Psychiatric:  awake, alert, appropriate mood                            10.3   14.31 )-----------( 315      ( 02 Dec 2024 07:19 )             31.5   12-02    138  |  102  |  11  ----------------------------<  176[H]  3.6   |  26  |  0.34[L]    Ca    8.9      02 Dec 2024 07:19  Phos  2.7     12-02  Mg     1.80     12-02    TPro  8.0  /  Alb  3.9  /  TBili  0.5  /  DBili  x   /  AST  12  /  ALT  8   /  AlkPhos  124[H]  12-01    Urinalysis Basic - ( 02 Dec 2024 07:19 )    Color: x / Appearance: x / SG: x / pH: x  Gluc: 176 mg/dL / Ketone: x  / Bili: x / Urobili: x   Blood: x / Protein: x / Nitrite: x   Leuk Esterase: x / RBC: x / WBC x   Sq Epi: x / Non Sq Epi: x / Bacteria: x    MICROBIOLOGY:              RADIOLOGY:  imaging below personally reviewed and agree with findings Patient is a 64y old  Female who presents with a chief complaint of hyperglycemia, cough, leg pain (02 Dec 2024 13:38)    HPI:  Pt is a 63 y/o female pmh htn, dm, asthma presents w/ complaint of cough x1 month, right foot pain/swelling x3 weeks and urinary frequency x3 weeks. Pt states symptoms of cough started about 1 month ago, she went to urgent care and was prescribed course of steroids and doxycycline which she completed. She states her symptoms of cough never went away nor did she feel better. She states she has redness, swelling of her right foot, she denies any drainage but feels like there is fluid there and feels like it's going to burst. She denies any trauma or injury to her foot but states she suffers for neuropathy. She also has been complaining of increased urinary frequency, stating that since she is coughing so hard she will sometimes urinate on herself. She denies any fevers, chest pain, abd pain, diarrhea, or vomiting. She states she is compliant with her insulin. In ED, vitals T 97.5, HR 97, /77, RR 24, O2 sat 95% on RA. Pt now s/p I&D of the R. foot by podiatry         PAST MEDICAL & SURGICAL HISTORY:  Type 2 diabetes mellitus without complication, without long-term current use of insulin      HTN (hypertension)      Asthma      Asthma with bronchitis          Allergies  No Known Allergies        ANTIMICROBIALS (past 90 days)  MEDICATIONS  (STANDING):  cefTRIAXone   IVPB   100 mL/Hr IV Intermittent (12-01-24 @ 21:55)    piperacillin/tazobactam IVPB.   200 mL/Hr IV Intermittent (12-01-24 @ 23:29)    piperacillin/tazobactam IVPB.-   25 mL/Hr IV Intermittent (12-02-24 @ 04:54)    piperacillin/tazobactam IVPB.-   25 mL/Hr IV Intermittent (12-02-24 @ 11:04)    vancomycin  IVPB   166.67 mL/Hr IV Intermittent (12-02-24 @ 12:18)   166.67 mL/Hr IV Intermittent (12-02-24 @ 01:56)        piperacillin/tazobactam IVPB.- 3.375 once  piperacillin/tazobactam IVPB.. 3.375 every 8 hours  vancomycin  IVPB 1250 every 12 hours    MEDICATIONS  (STANDING):  acetaminophen     Tablet .. 650 every 6 hours PRN  benzonatate 100 every 8 hours PRN  colchicine 1.2 once  dextrose 50% Injectable 25 once  dextrose 50% Injectable 12.5 once  dextrose 50% Injectable 25 once  dextrose Oral Gel 15 once  enoxaparin Injectable 40 every 12 hours  gabapentin 600 three times a day  glucagon  Injectable 1 once  insulin glargine Injectable (LANTUS) 26 at bedtime  insulin lispro (ADMELOG) corrective regimen sliding scale  three times a day before meals  insulin lispro (ADMELOG) corrective regimen sliding scale  at bedtime  insulin lispro Injectable (ADMELOG) 24 three times a day before meals  melatonin 3 at bedtime PRN  morphine  - Injectable 2 every 6 hours PRN  morphine  - Injectable 4 every 6 hours PRN        SOCIAL HISTORY: Denies any smoking or alcohol use. Pt does not work and is on disability.         FAMILY HISTORY:   + DM           REVIEW OF SYSTEMS:  CONSTITUTIONAL: No weakness. + fevers  EYES:  No eye pain   ENT: No throat pain   NECK: No pain or stiffness  RESPIRATORY: + cough, No shortness of breath  CARDIOVASCULAR: No chest pain  GASTROINTESTINAL: No abdominal or epigastric pain. No nausea, vomiting,  GENITOURINARY: No dysuria. + frequency  NEUROLOGICAL: No numbness or weakness  SKIN: R foot swelling with erythema   Psych: no anxiety or depression         Vital Signs Last 24 Hrs  T(F): 98 (12-02-24 @ 12:29), Max: 100.4 (12-02-24 @ 10:30)  Vital Signs Last 24 Hrs  HR: 90 (12-02-24 @ 10:30) (63 - 90)  BP: 117/89 (12-02-24 @ 10:30) (103/56 - 146/73)  RR: 18 (12-02-24 @ 10:30)  SpO2: 96% (12-02-24 @ 10:30) (96% - 98%)  Wt(kg): --    PHYSICAL EXAM:  Constitutional: non-toxic, no distress  HEAD/EYES: anicteric, no conjunctival injection  ENT:  supple, no thrush  Cardiovascular:   normal S1, S2, normal   Respiratory:  clear BS bilaterally,   GI:  soft, non-tender, normal bowel sounds  :  no padilla,   Musculoskeletal:  R foot wrapped, dressing c/d/i   Neurologic: awake and alert  Skin: no phlebitis  Psychiatric:  awake, alert, appropriate mood  Extremities: Warmth, swelling of Rt  foot and lower part of leg.                             10.3   14.31 )-----------( 315      ( 02 Dec 2024 07:19 )             31.5   12-02    138  |  102  |  11  ----------------------------<  176[H]  3.6   |  26  |  0.34[L]    Ca    8.9      02 Dec 2024 07:19  Phos  2.7     12-02  Mg     1.80     12-02    TPro  8.0  /  Alb  3.9  /  TBili  0.5  /  DBili  x   /  AST  12  /  ALT  8   /  AlkPhos  124[H]  12-01    Urinalysis Basic - ( 02 Dec 2024 07:19 )    Color: x / Appearance: x / SG: x / pH: x  Gluc: 176 mg/dL / Ketone: x  / Bili: x / Urobili: x   Blood: x / Protein: x / Nitrite: x   Leuk Esterase: x / RBC: x / WBC x   Sq Epi: x / Non Sq Epi: x / Bacteria: x    MICROBIOLOGY:        RADIOLOGY:  imaging below personally reviewed and agree with findings    < from: Xray Chest 2 Views PA/Lat (12.01.24 @ 15:17) >  IMPRESSION:  Hazy opacity in the right lower lung.

## 2024-12-02 NOTE — CONSULT NOTE ADULT - SUBJECTIVE AND OBJECTIVE BOX
HPI:  Pt is a 63 y/o female pmh htn, dm, asthma presents w/ complaint of cough x1 month, right foot pain/swelling x3 weeks and urinary frequency x3 weeks. Pt states symptoms of cough started about 1 month ago, she went to urgent care and was prescribed course of steroids and doxycycline which she completed. She states her symptoms of cough never went away nor did she feel better. She states she has redness, swelling of her right foot, she denies any drainage but feels like there is fluid there and feels like it's going to burst. She denies any trauma or injury to her foot but states she suffers for neuropathy. She also has been complaining of increased urinary frequency, stating that since she is coughing so hard she will sometimes urinate on herself. She denies any fevers, chest pain, abd pain, diarrhea, or vomiting. She states she is compliant with her insulin.     In ED, vitals T 97.5, HR 97, /77, RR 24, O2 sat 95% on RA  Labs significant for: wbc 16.81, glucose 630, a1c 13.4, U/A positive for infection  EKG personally reviewed: NSR, 1st degree AV block, no acute ischemic changes  CXR: prelim radiology read: Right basilar hazy opacity  ED management: endo consulted. lispro 30 units x1, IV morphine 4 mg x1, lantus 26 units x1, 1L NS, endo consulted (01 Dec 2024 21:06)    Endocrine History:    64 y.o. female with h/o Type 2DM diagnosed 8 years ago and follows with her PCP. She reports difficulty located an endocrinologist given her insurance.    She is past due for ophthalmology and not aware of retinopathy.  She does have neuropathy and does see podiatry  Not aware of kidney disease    At home:  Lantus 28 units SQ QHS  Novolog 30 units QAC    Inpatient  Lantus 26 units QHS  Admelog 24 units QAC      Diet:  Breakfast: skips  Lunch/Dinner: protein like fish, plantain  Drinks: Regular soda    She does have CGMS at home but not using because it kept falling off    She does SMBG at times with  to 400s and variable  Denies hypoglycemia    At present she reports cough, urinary incontinence and right foot swelling and pain.        PAST MEDICAL & SURGICAL HISTORY:  Type 2 diabetes mellitus without complication, without long-term current use of insulin      HTN (hypertension)      Asthma      Asthma with bronchitis          FAMILY HISTORY:  dad- Type 2 DM  mother  of cirrhosis    Social History:  no smoking or ETOH use    Outpatient Medications:  gabapentin 600 mg oral tablet: 1 tab(s) orally 3 times a day (02 Dec 2024 00:32)  Lantus Solostar Pen 100 units/mL subcutaneous solution: 28 unit(s) subcutaneous once a day (at bedtime) (02 Dec 2024 00:17)  NovoLOG FlexPen 100 units/mL injectable solution: 30 unit(s) injectable 3 times a day (before meals) (02 Dec 2024 00:18)  rosuvastatin:  (02 Dec 2024 00:17)    MEDICATIONS  (STANDING):  dextrose 5%. 1000 milliLiter(s) (50 mL/Hr) IV Continuous <Continuous>  dextrose 5%. 1000 milliLiter(s) (100 mL/Hr) IV Continuous <Continuous>  dextrose 50% Injectable 25 Gram(s) IV Push once  dextrose 50% Injectable 12.5 Gram(s) IV Push once  dextrose 50% Injectable 25 Gram(s) IV Push once  dextrose Oral Gel 15 Gram(s) Oral once  enoxaparin Injectable 40 milliGRAM(s) SubCutaneous every 12 hours  gabapentin 600 milliGRAM(s) Oral three times a day  glucagon  Injectable 1 milliGRAM(s) IntraMuscular once  insulin glargine Injectable (LANTUS) 26 Unit(s) SubCutaneous at bedtime  insulin lispro (ADMELOG) corrective regimen sliding scale   SubCutaneous three times a day before meals  insulin lispro (ADMELOG) corrective regimen sliding scale   SubCutaneous at bedtime  insulin lispro Injectable (ADMELOG) 24 Unit(s) SubCutaneous three times a day before meals  piperacillin/tazobactam IVPB.- 3.375 Gram(s) IV Intermittent once  piperacillin/tazobactam IVPB.. 3.375 Gram(s) IV Intermittent every 8 hours  vancomycin  IVPB 1250 milliGRAM(s) IV Intermittent every 12 hours    MEDICATIONS  (PRN):  acetaminophen     Tablet .. 650 milliGRAM(s) Oral every 6 hours PRN Temp greater or equal to 38C (100.4F), Mild Pain (1 - 3)  benzonatate 100 milliGRAM(s) Oral every 8 hours PRN Cough  melatonin 3 milliGRAM(s) Oral at bedtime PRN Insomnia  morphine  - Injectable 2 milliGRAM(s) IV Push every 6 hours PRN Moderate Pain (4 - 6)  morphine  - Injectable 4 milliGRAM(s) IV Push every 6 hours PRN Severe Pain (7 - 10)      Allergies    No Known Allergies    Intolerances      Review of Systems:  Constitutional: reports fever  Eyes: No blurry vision  Neuro: No tremors  HEENT: No pain  Cardiovascular: No chest pain, palpitations  Respiratory: reports cough  GI: No nausea, vomiting, abdominal pain  : reports burning and stress incontinence  Skin: no rash  Psych: no depression  Endocrine: no polyuria, polydipsia  Hem/lymph: right foot swelling  Osteoporosis: no fractures    ALL OTHER SYSTEMS REVIEWED AND NEGATIVE      PHYSICAL EXAM:  VITALS: T(C): 36.7 (24 @ 12:29)  T(F): 98 (24 @ 12:29), Max: 100.4 (24 @ 10:30)  HR: 90 (24 @ 10:30) (63 - 93)  BP: 117/89 (24 @ 10:30) (103/56 - 146/73)  RR:  (17 - 18)  SpO2:  (96% - 98%)  Wt(kg): --  GENERAL: NAD  EYES: No proptosis, no lid lag, anicteric  HEENT:  Atraumatic, Normocephalic  THYROID: Normal size, no palpable nodules  RESPIRATORY: Clear to auscultation bilaterally  CARDIOVASCULAR: Regular rate and rhythm; right peripheral edema  GI: Soft, nontender, non distended, normal bowel sounds  SKIN: warm right foot  MUSCULOSKELETAL: Full range of motion, normal strength  NEURO: extraocular movements intact, no tremor  PSYCH:  normal affect, normal mood      POCT Blood Glucose.: 121 mg/dL (24 @ 12:00)  POCT Blood Glucose.: 181 mg/dL (24 @ 08:34)  POCT Blood Glucose.: 280 mg/dL (24 @ 22:34)  POCT Blood Glucose.: 391 mg/dL (24 @ 20:27)  POCT Blood Glucose.: 456 mg/dL (24 @ 18:21)  POCT Blood Glucose.: 481 mg/dL (24 @ 16:01)  POCT Blood Glucose.: 586 mg/dL (12-01-24 @ 12:19)  POCT Blood Glucose.: >600 mg/dL (24 @ 12:18)                            10.3   14.31 )-----------( 315      ( 02 Dec 2024 07:19 )             31.5           138  |  102  |  11  ----------------------------<  176[H]  3.6   |  26  |  0.34[L]    eGFR: 115    Ca    8.9        Mg     1.80       Phos  2.7         TPro  8.0  /  Alb  3.9  /  TBili  0.5  /  DBili  x   /  AST  12  /  ALT  8   /  AlkPhos  124[H]        Thyroid Function Tests:              Radiology:

## 2024-12-02 NOTE — DISCHARGE NOTE PROVIDER - CARE PROVIDER_API CALL
Taz Sanders  Podiatric Medicine and Surgery  [address], [address]  [place] [address]  Phone: [phone]  Fax: [phone]  Follow Up Time:

## 2024-12-02 NOTE — DISCHARGE NOTE PROVIDER - ATTENDING DISCHARGE PHYSICAL EXAMINATION:
PHYSICAL EXAM:  Vital Signs Last 24 Hrs  T(C): 36.8 (06 Dec 2024 10:05), Max: 36.9 (05 Dec 2024 17:54)  T(F): 98.3 (06 Dec 2024 10:05), Max: 98.5 (05 Dec 2024 17:54)  HR: 88 (06 Dec 2024 10:05) (71 - 97)  BP: 134/71 (06 Dec 2024 10:05) (120/73 - 146/77)  BP(mean): --  RR: 18 (06 Dec 2024 10:05) (18 - 18)  SpO2: 99% (06 Dec 2024 10:05) (97% - 100%)    Parameters below as of 06 Dec 2024 08:42  Patient On (Oxygen Delivery Method): room air      CONSTITUTIONAL: NAD, well-groomed  EYES: PERRLA; conjunctiva and sclera clear  ENMT: Moist oral mucosa, no pharyngeal injection or exudates  NECK: Supple, no palpable masses; no thyromegaly  RESPIRATORY: Normal respiratory effort; lungs are clear to auscultation bilaterally  CARDIOVASCULAR: Regular rate and rhythm, normal S1 and S2, no murmur/rub/gallop; No lower extremity edema; Peripheral pulses are 2+ bilaterally  ABDOMEN: Nontender to palpation, normoactive bowel sounds, no rebound/guarding; No hepatosplenomegaly  MUSCULOSKELETAL: no clubbing or cyanosis of digits; no joint swelling or tenderness to palpation  PSYCH: A+O to person, place, and time; affect appropriate  NEUROLOGY: CN 2-12 are intact and symmetric; no gross sensory deficits   SKIN: No rashes; right dorsal foot cellulitis with compression dressing

## 2024-12-02 NOTE — DISCHARGE NOTE PROVIDER - NSDCMRMEDTOKEN_GEN_ALL_CORE_FT
gabapentin 600 mg oral tablet: 1 tab(s) orally 3 times a day  Lantus Solostar Pen 100 units/mL subcutaneous solution: 28 unit(s) subcutaneous once a day (at bedtime)  losartan 25 mg oral tablet: 1 tab(s) orally once a day  NovoLOG FlexPen 100 units/mL injectable solution: 30 unit(s) injectable 3 times a day (before meals)  rosuvastatin:    gabapentin 600 mg oral tablet: 1 tab(s) orally 3 times a day  Lantus Solostar Pen 100 units/mL subcutaneous solution: 28 unit(s) subcutaneous once a day (at bedtime)  losartan 25 mg oral tablet: 1 tab(s) orally once a day  NovoLOG FlexPen 100 units/mL injectable solution: 30 unit(s) injectable 3 times a day (before meals)  Ozempic 2 mg/3 mL (0.25 mg or 0.5 mg dose) subcutaneous solution: 0.25 milligram(s) subcutaneously once a week  rosuvastatin:    acetaminophen 325 mg oral tablet: 2 tab(s) orally every 6 hours As needed Temp greater or equal to 38C (100.4F), Mild Pain (1 - 3)  amoxicillin-clavulanate 875 mg-125 mg oral tablet: 1 tab(s) orally every 12 hours  aspirin 81 mg oral tablet, chewable: 1 tab(s) orally once a day  gabapentin 600 mg oral tablet: 1 tab(s) orally 3 times a day  lactobacillus acidophilus oral capsule: 1 cap(s) orally once a day  Lantus Solostar Pen 100 units/mL subcutaneous solution: 36 unit(s) subcutaneous once a day (at bedtime)  NovoLOG FlexPen 100 units/mL injectable solution: 28 unit(s) injectable 3 times a day (before meals)  Ozempic 2 mg/3 mL (0.25 mg or 0.5 mg dose) subcutaneous solution: 0.25 milligram(s) subcutaneously once a week  rosuvastatin:

## 2024-12-02 NOTE — DISCHARGE NOTE PROVIDER - NSDCCPTREATMENT_GEN_ALL_CORE_FT
PRINCIPAL PROCEDURE  Procedure: MRI foot right  Findings and Treatment: TECHNIQUE: Multiplanar, multisequential MR imaging was performed.  FINDINGS: There is a dorsal cutaneous wound at the level of the second   metatarsal with adjacent skin thickening and subcutaneous fat   infiltration and edema signal, consistent with cellulitis. The wound is   suspected to communicate with a subcentimeter collection in the dorsal   subcutaneous tissues at this level. There are no areas of marrow signal   alteration that raise suspicion for osteomyelitis. There are mild   osteoarthritic changes at the midfoot and tarsometatarsal articulations,   most pronounced at the second tarsometatarsal articulation. There is   diffuse fatty atrophy and high T2 signal of musculature, consistent with   denervation.  IMPRESSION: Dorsal cutaneous wound with adjacentcellulitis. No   osteomyelitis.        SECONDARY PROCEDURE  Procedure: XR foot right, 3 views  Findings and Treatment: Portable frontal and lateral right foot from 12/5/2024at 12:15. Reviewed   in conjunction with previous day right foot MRI.  IMPRESSION:  Diffuse soft tissue swelling could correlate with cellulitis. No tracking   soft tissue gas collections, radiopaque foreign bodies, or gross   radiographic evidencefor osteomyelitis.  No fractures or dislocations.  Tarsometatarsal alignment maintained without evidence for a Lisfranc   injury. Congenitally fused 5th DIP joint. Hypertrophic degenerative   spurring along dorsal hindfoot and midfoot articular margins. Preserved   remaining joint spaces and no joint margin erosions.  Thick plantar and small posterior calcaneal enthesophytes.  Generalized osteopenia otherwise no discrete lytic or blastic lesions.

## 2024-12-02 NOTE — DISCHARGE NOTE PROVIDER - NSDCCPCAREPLAN_GEN_ALL_CORE_FT
PRINCIPAL DISCHARGE DIAGNOSIS  Diagnosis: Sepsis  Assessment and Plan of Treatment: You were admitted with a severe infection due to a skin infection of your right foot (cellulitis), pneumonia, and urinary tract infection. You were started on empiric antibiotic coverage.      SECONDARY DISCHARGE DIAGNOSES  Diagnosis: Cough  Assessment and Plan of Treatment:     Diagnosis: Hyperglycemia  Assessment and Plan of Treatment:     Diagnosis: Leg pain  Assessment and Plan of Treatment:      PRINCIPAL DISCHARGE DIAGNOSIS  Diagnosis: Sepsis  Assessment and Plan of Treatment: You were admitted with a severe infection due to a skin infection of your right foot (cellulitis), pneumonia, and urinary tract infection. You were started on empiric antibiotic coverage. Your wound culture grew a bacteria called Group B streptococcus.      SECONDARY DISCHARGE DIAGNOSES  Diagnosis: Hyperglycemia  Assessment and Plan of Treatment: You were found to have uncontrolled type 2 diabetes and were seen by the endocrinologist. Your medications were adjusted.  You should follow up with an endocrinologist and see an ophthalmologist to check for diabetic retinopathy.     PRINCIPAL DISCHARGE DIAGNOSIS  Diagnosis: Sepsis  Assessment and Plan of Treatment: You were admitted with a severe infection due to a skin infection of your right foot (cellulitis), pneumonia, and urinary tract infection. You were started on empiric antibiotic coverage. Your wound culture grew a bacteria called Group B streptococcus.      SECONDARY DISCHARGE DIAGNOSES  Diagnosis: Hyperglycemia  Assessment and Plan of Treatment: You were found to have uncontrolled type 2 diabetes and were seen by the endocrinologist. Your medications were adjusted.  You should follow up with an endocrinologist and see an ophthalmologist to check for diabetic retinopathy.  Discharge recommendations:   - Lantus 26 units at bedtime   - Novolog 28 units 3x daily before meals   - Ozempic 0.25mg once weekly; if tolerating on 5th week plan to increase to 0.5mg once weekly --> Will need to follow up with endocrinology for new prescription when 0.25mg dose runs out

## 2024-12-02 NOTE — CONSULT NOTE ADULT - ASSESSMENT
65 y/o female pmh htn, dm, asthma presents w/ complaint of cough x1 month, right foot pain/swelling x3 weeks and urinary frequency x3 weeks. Now s/p I&D of the R foot with podiatry     Assessment  #Sepsis  -Pt with cough x1 month, right foot pain/swelling x3 weeks and urinary frequency x3 weeks  -Pt prescribed course of steroids and doxycycline which she completed  -Pt febrile with elevated WBC  -Now s/p I&D of the R foot with podiatry    Recommendation:   63 y/o female pmh htn, dm, asthma presents w/ complaint of cough x1 month, right foot pain/swelling x3 weeks and urinary frequency x3 weeks. Now s/p I&D of the R foot with podiatry     Assessment  #Sepsis  -Pt with cough x1 month, right foot pain/swelling x3 weeks and urinary frequency x3 weeks but no dysuria   -Pt prescribed course of steroids and doxycycline which she completed  -Pt febrile with elevated WBC  -On podiatry exam: Right foot dorsal erythema with fluctuance. No open wounds bilaterally.  -Now s/p I&D of the R foot with podiatry with gouty material/ purulence expressed.    Recommendation:   65 y/o female pmh htn, dm, asthma presents w/ complaint of cough x1 month, right foot pain/swelling x3 weeks and urinary frequency x3 weeks. Now s/p I&D of the R foot with podiatry     Assessment  #Sepsis  -Pt with cough x1 month, right foot pain/swelling x3 weeks and urinary frequency x3 weeks but no dysuria   -Pt prescribed course of steroids and doxycycline which she completed  -Pt febrile with elevated WBC  -On podiatry exam: Right foot dorsal erythema with fluctuance. No open wounds bilaterally.  -Now s/p I&D of the R foot with podiatry with gouty material/ purulence expressed.    Recommendation:  -Cont Vanc and Zosyn for now. If MRSA swab neg, can d/c Vanc  -F/U I&D cx and BCX   -F/U X-ray foot and MR Foot  -Cont to monitor fever and WBC curve    Alexy Roberts  ID Fellow

## 2024-12-02 NOTE — DISCHARGE NOTE PROVIDER - NSDCFUADDAPPT_GEN_ALL_CORE_FT
Podiatry Discharge Instructions:  Follow up: Please follow up with Dr. Gilbert within 1 week of discharge from the hospital, please call [phone] for appointment and discuss that you recently were seen in the hospital.  Wound Care: Please apply iodoform packing to right foot wound followed by 4x4 gauze, ABD pad, and divine daily, thank you!   Weight bearing: Please weight bear as tolerated in a surgical shoe to right heel.  Antibiotics: Please continue as instructed. Podiatry Discharge Instructions:  Follow up: Please follow up with Dr. Sanders within 1 week of discharge from the hospital, please call [phone] for appointment and discuss that you recently were seen in the hospital.  Wound Care: Please apply iodoform packing to right foot wound followed by 4x4 gauze, ABD pad, and divine daily, thank you!   Weight bearing: Please weight bear as tolerated in a surgical shoe to right heel.  Antibiotics: Please continue as instructed. Follow up with PCP and endocrinology. Please follow up at [facility] medicine Specialties at [place] [address] [place] [address]; [phone]. The office was notified you will need follow up. If you do not receive a call to make an appt, please call the office to confirm and arrange appt. You will also need to follow up with an ophthalmologist. If needed, you may follow up with [facility].     Podiatry Discharge Instructions:  Follow up: Please follow up with Dr. Sanders within 1 week of discharge from the hospital, please call [phone] for appointment and discuss that you recently were seen in the hospital.  Wound Care: Please apply iodoform packing to right foot wound followed by 4x4 gauze, ABD pad, and divine daily, thank you!   Weight bearing: Please weight bear as tolerated in a surgical shoe to right heel.  Antibiotics: Please continue as instructed.

## 2024-12-02 NOTE — CONSULT NOTE ADULT - SUBJECTIVE AND OBJECTIVE BOX
Patient is a 64y old  Female who presents with a chief complaint of hyperglycemia, cough, leg pain (02 Dec 2024 12:18)      HPI:  Pt is a 63 y/o female pmh htn, dm, asthma presents w/ complaint of cough x1 month, right foot pain/swelling x3 weeks and urinary frequency x3 weeks. Pt states symptoms of cough started about 1 month ago, she went to urgent care and was prescribed course of steroids and doxycycline which she completed. She states her symptoms of cough never went away nor did she feel better. She states she has redness, swelling of her right foot, she denies any drainage but feels like there is fluid there and feels like it's going to burst. She denies any trauma or injury to her foot but states she suffers for neuropathy. She also has been complaining of increased urinary frequency, stating that since she is coughing so hard she will sometimes urinate on herself. She denies any fevers, chest pain, abd pain, diarrhea, or vomiting. She states she is compliant with her insulin.     In ED, vitals T 97.5, HR 97, /77, RR 24, O2 sat 95% on RA  Labs significant for: wbc 16.81, glucose 630, a1c 13.4, U/A positive for infection  EKG personally reviewed: NSR, 1st degree AV block, no acute ischemic changes  CXR: prelim radiology read: Right basilar hazy opacity  ED management: endo consulted. lispro 30 units x1, IV morphine 4 mg x1, lantus 26 units x1, 1L NS, endo consulted (01 Dec 2024 21:06)      PAST MEDICAL & SURGICAL HISTORY:  Type 2 diabetes mellitus without complication, without long-term current use of insulin      HTN (hypertension)      Asthma      Asthma with bronchitis          MEDICATIONS  (STANDING):  dextrose 5%. 1000 milliLiter(s) (50 mL/Hr) IV Continuous <Continuous>  dextrose 5%. 1000 milliLiter(s) (100 mL/Hr) IV Continuous <Continuous>  dextrose 50% Injectable 25 Gram(s) IV Push once  dextrose 50% Injectable 12.5 Gram(s) IV Push once  dextrose 50% Injectable 25 Gram(s) IV Push once  dextrose Oral Gel 15 Gram(s) Oral once  enoxaparin Injectable 40 milliGRAM(s) SubCutaneous every 12 hours  gabapentin 600 milliGRAM(s) Oral three times a day  glucagon  Injectable 1 milliGRAM(s) IntraMuscular once  insulin glargine Injectable (LANTUS) 26 Unit(s) SubCutaneous at bedtime  insulin lispro (ADMELOG) corrective regimen sliding scale   SubCutaneous three times a day before meals  insulin lispro (ADMELOG) corrective regimen sliding scale   SubCutaneous at bedtime  insulin lispro Injectable (ADMELOG) 24 Unit(s) SubCutaneous three times a day before meals  piperacillin/tazobactam IVPB.- 3.375 Gram(s) IV Intermittent once  piperacillin/tazobactam IVPB.. 3.375 Gram(s) IV Intermittent every 8 hours  vancomycin  IVPB 1250 milliGRAM(s) IV Intermittent every 12 hours    MEDICATIONS  (PRN):  acetaminophen     Tablet .. 650 milliGRAM(s) Oral every 6 hours PRN Temp greater or equal to 38C (100.4F), Mild Pain (1 - 3)  benzonatate 100 milliGRAM(s) Oral every 8 hours PRN Cough  melatonin 3 milliGRAM(s) Oral at bedtime PRN Insomnia  morphine  - Injectable 2 milliGRAM(s) IV Push every 6 hours PRN Moderate Pain (4 - 6)  morphine  - Injectable 4 milliGRAM(s) IV Push every 6 hours PRN Severe Pain (7 - 10)      Allergies    No Known Allergies    Intolerances        VITALS:    Vital Signs Last 24 Hrs  T(C): 38 (02 Dec 2024 10:30), Max: 38 (02 Dec 2024 10:30)  T(F): 100.4 (02 Dec 2024 10:30), Max: 100.4 (02 Dec 2024 10:30)  HR: 90 (02 Dec 2024 10:30) (63 - 93)  BP: 117/89 (02 Dec 2024 10:30) (103/56 - 146/73)  BP(mean): --  RR: 18 (02 Dec 2024 10:30) (17 - 20)  SpO2: 96% (02 Dec 2024 10:30) (96% - 99%)    Parameters below as of 02 Dec 2024 05:21  Patient On (Oxygen Delivery Method): room air        LABS:                          10.3   14.31 )-----------( 315      ( 02 Dec 2024 07:19 )             31.5       12-02    138  |  102  |  11  ----------------------------<  176[H]  3.6   |  26  |  0.34[L]    Ca    8.9      02 Dec 2024 07:19  Phos  2.7     12-02  Mg     1.80     12-02    TPro  8.0  /  Alb  3.9  /  TBili  0.5  /  DBili  x   /  AST  12  /  ALT  8   /  AlkPhos  124[H]  12-01      CAPILLARY BLOOD GLUCOSE      POCT Blood Glucose.: 121 mg/dL (02 Dec 2024 12:00)  POCT Blood Glucose.: 181 mg/dL (02 Dec 2024 08:34)  POCT Blood Glucose.: 280 mg/dL (01 Dec 2024 22:34)  POCT Blood Glucose.: 391 mg/dL (01 Dec 2024 20:27)  POCT Blood Glucose.: 456 mg/dL (01 Dec 2024 18:21)  POCT Blood Glucose.: 481 mg/dL (01 Dec 2024 16:01)          LOWER EXTREMITY PHYSICAL EXAM:    Vascular: DP 1/4, B/L PT 0/4 B/L, CFT <3 seconds B/L, Temperature gradient warm to cool, B/L.   Neuro: Epicritic sensation diminished to the level of digits, B/L.  Musculoskeletal/Ortho: pain upon palpation of right foot   Skin: Right foot dorsal erythema with fluctuance. No open wounds bilaterally.       RADIOLOGY & ADDITIONAL STUDIES:

## 2024-12-02 NOTE — DISCHARGE NOTE PROVIDER - NSFOLLOWUPCLINICS_GEN_ALL_ED_FT
[facility] Ophthalmology  Ophthalmology  [address] [place] [address], [address]  [place] [address]  Phone: [phone]  Fax:     [facility] Medicine Specialties at [place]  Internal Medicine  [address] [place] [address]  Phone: [phone]  Fax: [phone]

## 2024-12-03 LAB
ANION GAP SERPL CALC-SCNC: 9 MMOL/L — SIGNIFICANT CHANGE UP (ref 7–14)
BASOPHILS # BLD AUTO: 0.03 K/UL — SIGNIFICANT CHANGE UP (ref 0–0.2)
BASOPHILS NFR BLD AUTO: 0.3 % — SIGNIFICANT CHANGE UP (ref 0–2)
BUN SERPL-MCNC: 10 MG/DL — SIGNIFICANT CHANGE UP (ref 7–23)
CALCIUM SERPL-MCNC: 9 MG/DL — SIGNIFICANT CHANGE UP (ref 8.4–10.5)
CHLORIDE SERPL-SCNC: 99 MMOL/L — SIGNIFICANT CHANGE UP (ref 98–107)
CO2 SERPL-SCNC: 25 MMOL/L — SIGNIFICANT CHANGE UP (ref 22–31)
CREAT SERPL-MCNC: 0.36 MG/DL — LOW (ref 0.5–1.3)
EGFR: 113 ML/MIN/1.73M2 — SIGNIFICANT CHANGE UP
EOSINOPHIL # BLD AUTO: 0.14 K/UL — SIGNIFICANT CHANGE UP (ref 0–0.5)
EOSINOPHIL NFR BLD AUTO: 1.2 % — SIGNIFICANT CHANGE UP (ref 0–6)
GLUCOSE BLDC GLUCOMTR-MCNC: 143 MG/DL — HIGH (ref 70–99)
GLUCOSE BLDC GLUCOMTR-MCNC: 164 MG/DL — HIGH (ref 70–99)
GLUCOSE BLDC GLUCOMTR-MCNC: 301 MG/DL — HIGH (ref 70–99)
GLUCOSE BLDC GLUCOMTR-MCNC: 305 MG/DL — HIGH (ref 70–99)
GLUCOSE SERPL-MCNC: 308 MG/DL — HIGH (ref 70–99)
GRAM STN FLD: ABNORMAL
HCT VFR BLD CALC: 34.2 % — LOW (ref 34.5–45)
HGB BLD-MCNC: 10.8 G/DL — LOW (ref 11.5–15.5)
IANC: 7.33 K/UL — SIGNIFICANT CHANGE UP (ref 1.8–7.4)
IMM GRANULOCYTES NFR BLD AUTO: 0.3 % — SIGNIFICANT CHANGE UP (ref 0–0.9)
LYMPHOCYTES # BLD AUTO: 27.4 % — SIGNIFICANT CHANGE UP (ref 13–44)
LYMPHOCYTES # BLD AUTO: 3.17 K/UL — SIGNIFICANT CHANGE UP (ref 1–3.3)
MAGNESIUM SERPL-MCNC: 1.9 MG/DL — SIGNIFICANT CHANGE UP (ref 1.6–2.6)
MCHC RBC-ENTMCNC: 29.3 PG — SIGNIFICANT CHANGE UP (ref 27–34)
MCHC RBC-ENTMCNC: 31.6 G/DL — LOW (ref 32–36)
MCV RBC AUTO: 92.7 FL — SIGNIFICANT CHANGE UP (ref 80–100)
MONOCYTES # BLD AUTO: 0.85 K/UL — SIGNIFICANT CHANGE UP (ref 0–0.9)
MONOCYTES NFR BLD AUTO: 7.4 % — SIGNIFICANT CHANGE UP (ref 2–14)
NEUTROPHILS # BLD AUTO: 7.33 K/UL — SIGNIFICANT CHANGE UP (ref 1.8–7.4)
NEUTROPHILS NFR BLD AUTO: 63.4 % — SIGNIFICANT CHANGE UP (ref 43–77)
NRBC # BLD: 0 /100 WBCS — SIGNIFICANT CHANGE UP (ref 0–0)
NRBC # FLD: 0 K/UL — SIGNIFICANT CHANGE UP (ref 0–0)
PHOSPHATE SERPL-MCNC: 2.7 MG/DL — SIGNIFICANT CHANGE UP (ref 2.5–4.5)
PLATELET # BLD AUTO: 318 K/UL — SIGNIFICANT CHANGE UP (ref 150–400)
POTASSIUM SERPL-MCNC: 4.4 MMOL/L — SIGNIFICANT CHANGE UP (ref 3.5–5.3)
POTASSIUM SERPL-SCNC: 4.4 MMOL/L — SIGNIFICANT CHANGE UP (ref 3.5–5.3)
RBC # BLD: 3.69 M/UL — LOW (ref 3.8–5.2)
RBC # FLD: 12.6 % — SIGNIFICANT CHANGE UP (ref 10.3–14.5)
SODIUM SERPL-SCNC: 133 MMOL/L — LOW (ref 135–145)
WBC # BLD: 11.55 K/UL — HIGH (ref 3.8–10.5)
WBC # FLD AUTO: 11.55 K/UL — HIGH (ref 3.8–10.5)

## 2024-12-03 PROCEDURE — 99232 SBSQ HOSP IP/OBS MODERATE 35: CPT

## 2024-12-03 PROCEDURE — 99253 IP/OBS CNSLTJ NEW/EST LOW 45: CPT | Mod: GC

## 2024-12-03 PROCEDURE — 99233 SBSQ HOSP IP/OBS HIGH 50: CPT

## 2024-12-03 RX ORDER — ROSUVASTATIN CALCIUM 5 MG/1
20 TABLET, FILM COATED ORAL AT BEDTIME
Refills: 0 | Status: DISCONTINUED | OUTPATIENT
Start: 2024-12-03 | End: 2024-12-06

## 2024-12-03 RX ORDER — ORAL SEMAGLUTIDE 7 MG/1
0.5 TABLET ORAL
Qty: 4 | Refills: 0
Start: 2024-12-03 | End: 2025-01-01

## 2024-12-03 RX ORDER — IPRATROPIUM BROMIDE AND ALBUTEROL SULFATE 2.5; .5 MG/3ML; MG/3ML
3 SOLUTION RESPIRATORY (INHALATION) EVERY 6 HOURS
Refills: 0 | Status: DISCONTINUED | OUTPATIENT
Start: 2024-12-03 | End: 2024-12-06

## 2024-12-03 RX ORDER — INSULIN GLARGINE 100 [IU]/ML
31 INJECTION, SOLUTION SUBCUTANEOUS AT BEDTIME
Refills: 0 | Status: DISCONTINUED | OUTPATIENT
Start: 2024-12-03 | End: 2024-12-05

## 2024-12-03 RX ADMIN — GABAPENTIN 600 MILLIGRAM(S): 300 CAPSULE ORAL at 06:30

## 2024-12-03 RX ADMIN — PIPERACILLIN SODIUM AND TAZOBACTAM SODIUM 25 GRAM(S): 4; .5 INJECTION, POWDER, LYOPHILIZED, FOR SOLUTION INTRAVENOUS at 21:24

## 2024-12-03 RX ADMIN — Medication 4 MILLIGRAM(S): at 16:01

## 2024-12-03 RX ADMIN — Medication 4 MILLIGRAM(S): at 17:01

## 2024-12-03 RX ADMIN — Medication 4 MILLIGRAM(S): at 07:34

## 2024-12-03 RX ADMIN — PIPERACILLIN SODIUM AND TAZOBACTAM SODIUM 25 GRAM(S): 4; .5 INJECTION, POWDER, LYOPHILIZED, FOR SOLUTION INTRAVENOUS at 14:04

## 2024-12-03 RX ADMIN — INSULIN GLARGINE 31 UNIT(S): 100 INJECTION, SOLUTION SUBCUTANEOUS at 21:23

## 2024-12-03 RX ADMIN — Medication 28 UNIT(S): at 18:02

## 2024-12-03 RX ADMIN — GABAPENTIN 600 MILLIGRAM(S): 300 CAPSULE ORAL at 21:23

## 2024-12-03 RX ADMIN — Medication 8: at 09:09

## 2024-12-03 RX ADMIN — Medication 8: at 12:28

## 2024-12-03 RX ADMIN — ROSUVASTATIN CALCIUM 20 MILLIGRAM(S): 5 TABLET, FILM COATED ORAL at 21:23

## 2024-12-03 RX ADMIN — Medication 24 UNIT(S): at 09:08

## 2024-12-03 RX ADMIN — BENZONATATE 100 MILLIGRAM(S): 100 CAPSULE ORAL at 21:23

## 2024-12-03 RX ADMIN — Medication 4 MILLIGRAM(S): at 06:42

## 2024-12-03 RX ADMIN — IPRATROPIUM BROMIDE AND ALBUTEROL SULFATE 3 MILLILITER(S): 2.5; .5 SOLUTION RESPIRATORY (INHALATION) at 16:26

## 2024-12-03 RX ADMIN — GABAPENTIN 600 MILLIGRAM(S): 300 CAPSULE ORAL at 14:04

## 2024-12-03 RX ADMIN — PIPERACILLIN SODIUM AND TAZOBACTAM SODIUM 25 GRAM(S): 4; .5 INJECTION, POWDER, LYOPHILIZED, FOR SOLUTION INTRAVENOUS at 06:42

## 2024-12-03 RX ADMIN — ENOXAPARIN SODIUM 40 MILLIGRAM(S): 30 INJECTION SUBCUTANEOUS at 18:03

## 2024-12-03 RX ADMIN — Medication 4 MILLIGRAM(S): at 00:30

## 2024-12-03 RX ADMIN — ENOXAPARIN SODIUM 40 MILLIGRAM(S): 30 INJECTION SUBCUTANEOUS at 06:30

## 2024-12-03 RX ADMIN — Medication 81 MILLIGRAM(S): at 16:00

## 2024-12-03 RX ADMIN — IPRATROPIUM BROMIDE AND ALBUTEROL SULFATE 3 MILLILITER(S): 2.5; .5 SOLUTION RESPIRATORY (INHALATION) at 22:07

## 2024-12-03 RX ADMIN — Medication 24 UNIT(S): at 12:27

## 2024-12-03 NOTE — DIETITIAN INITIAL EVALUATION ADULT - NS FNS DIET ORDER
Diet, Regular:   Consistent Carbohydrate {No Snacks} (CSTCHO)  DASH/TLC {Sodium & Cholesterol Restricted} (DASH) (12-01-24 @ 23:40) [Active]

## 2024-12-03 NOTE — DIETITIAN INITIAL EVALUATION ADULT - OTHER CALCULATIONS
Defer fluid needs to MD/Team.   Ideal Body Weight: 125lbs / 56.6kg +/-10%. IBW used to calculate nutritional estimated needs.   Wt hx as per [facility] HIE:130.6kg (dosing wt 12/2), 131.1kg (10/4), 135.2kg (9/2/23). Wt appears relatively stable.

## 2024-12-03 NOTE — DIETITIAN INITIAL EVALUATION ADULT - ORAL INTAKE PTA/DIET HISTORY
Pt lives at home with home health aide. No difficulty obtaining groceries. No specific diet followed PTA. No supplements/Vitamins taken PTA. Pt skips breakfast and drinks regular soda.  Pt stated height 65 inches.

## 2024-12-03 NOTE — DIETITIAN INITIAL EVALUATION ADULT - PERTINENT MEDS FT
MEDICATIONS  (STANDING):  albuterol/ipratropium for Nebulization 3 milliLiter(s) Nebulizer every 6 hours  dextrose 5%. 1000 milliLiter(s) (50 mL/Hr) IV Continuous <Continuous>  dextrose 5%. 1000 milliLiter(s) (100 mL/Hr) IV Continuous <Continuous>  dextrose 50% Injectable 25 Gram(s) IV Push once  dextrose 50% Injectable 12.5 Gram(s) IV Push once  dextrose 50% Injectable 25 Gram(s) IV Push once  dextrose Oral Gel 15 Gram(s) Oral once  enoxaparin Injectable 40 milliGRAM(s) SubCutaneous every 12 hours  gabapentin 600 milliGRAM(s) Oral three times a day  glucagon  Injectable 1 milliGRAM(s) IntraMuscular once  insulin glargine Injectable (LANTUS) 26 Unit(s) SubCutaneous at bedtime  insulin lispro (ADMELOG) corrective regimen sliding scale   SubCutaneous three times a day before meals  insulin lispro (ADMELOG) corrective regimen sliding scale   SubCutaneous at bedtime  insulin lispro Injectable (ADMELOG) 24 Unit(s) SubCutaneous three times a day before meals  piperacillin/tazobactam IVPB.. 3.375 Gram(s) IV Intermittent every 8 hours    MEDICATIONS  (PRN):  acetaminophen     Tablet .. 650 milliGRAM(s) Oral every 6 hours PRN Temp greater or equal to 38C (100.4F), Mild Pain (1 - 3)  benzonatate 100 milliGRAM(s) Oral every 8 hours PRN Cough  melatonin 3 milliGRAM(s) Oral at bedtime PRN Insomnia  morphine  - Injectable 2 milliGRAM(s) IV Push every 6 hours PRN Moderate Pain (4 - 6)  morphine  - Injectable 4 milliGRAM(s) IV Push every 6 hours PRN Severe Pain (7 - 10)

## 2024-12-03 NOTE — DIETITIAN INITIAL EVALUATION ADULT - NUTRITION DIAGNOSIS
Nutrition Note



RD called and spoke w/ NP (Marisol Burdick) via phone call and relayed TF rec. She 
agreed w/ RD; RD to implement order via TO/RB per NP. yes...

## 2024-12-03 NOTE — CONSULT NOTE ADULT - REASON FOR ADMISSION
hyperglycemia, cough, leg pain

## 2024-12-03 NOTE — DIETITIAN INITIAL EVALUATION ADULT - REASON FOR ADMISSION
Per chart, Pt is 65 y/o female pmh htn, dm, asthma presents w/ complaint of cough x1 month, right foot pain/swelling x3 weeks and urinary frequency x3 weeks. Pt admitted for uncontrolled DM w/ hyperglycemia and sepsis 2/2 UTI, pna and cellulitis.

## 2024-12-03 NOTE — PHYSICAL THERAPY INITIAL EVALUATION ADULT - ACTIVE RANGE OF MOTION EXAMINATION, REHAB EVAL
matilde. upper extremity Active ROM was WNL (within normal limits)/bilateral lower extremity Active ROM was WNL (within normal limits)

## 2024-12-03 NOTE — DIETITIAN INITIAL EVALUATION ADULT - PERTINENT LABORATORY DATA
12-03    133[L]  |  99  |  10  ----------------------------<  308[H]  4.4   |  25  |  0.36[L]    Ca    9.0      03 Dec 2024 07:00  Phos  2.7     12-03  Mg     1.90     12-03    TPro  8.0  /  Alb  3.9  /  TBili  0.5  /  DBili  x   /  AST  12  /  ALT  8   /  AlkPhos  124[H]  12-01  POCT Blood Glucose.: 301 mg/dL (12-03-24 @ 11:46)  A1C with Estimated Average Glucose Result: 13.4 % (12-01-24 @ 13:11)

## 2024-12-03 NOTE — CONSULT NOTE ADULT - ASSESSMENT
Pt is a 63 y/o female pmh htn, dm, asthma currently admitted and being treated for sepsis secondary to pneumonia and purulent cellulitis of left foot s/p I and D. Rheumatology consulted for expressed gouty material per podiatry.    # Right foot pain   #Elevated inflammatory markers.   -Uric acid 2.2,   -Xray with right sided Pneumonia  - Pending imaging of right foot    Recommendation :  -Recommend sending the sample obtained by podiatry for crystal analysis  - Initially fluctuant swelling that was drained with purulent material. In setting of active infection with pneumonia and purulent cellulitis, without clear evidence of gout. This mostly appears to be an  infection and should continue treatment with abx.  -Obtain Right foot MRI   Note incomplete. Please Wait          Pt is a 65 y/o female pmh htn, dm, asthma currently admitted and being treated for sepsis secondary to pneumonia and purulent cellulitis of left foot s/p I and D. Rheumatology consulted for expressed gouty material per podiatry.    # Right foot pain   #Elevated inflammatory markers.   -Uric acid 2.2,   -Xray with right sided Pneumonia  - Images before I &D and video of I &D obtained from podiatry and reviewed , appears to have significant purulent drainage    Recommendation :  - Initially fluctuant swelling that was drained with purulent material. In setting of active infection with pneumonia and purulent cellulitis, without clear evidence of gout. This mostly appears to be an infection and should continue treatment with abx. Not likely gout.       Rheumatology signing off

## 2024-12-03 NOTE — PHYSICAL THERAPY INITIAL EVALUATION ADULT - PERTINENT HX OF CURRENT PROBLEM, REHAB EVAL
Patient is a 64 year old Female, PMH stated below, presents with hyperglycemia and sepsis secondary to UTI, PNA and cellulitis.

## 2024-12-03 NOTE — PHYSICAL THERAPY INITIAL EVALUATION ADULT - STANDING BALANCE: STATIC
Ventricular Rate : 71   Atrial Rate : 71   P-R Interval : 160   QRS Duration : 112   Q-T Interval : 428   QTC Calculation(Bezet) : 465   P Axis : 50   R Axis : -57   T Axis : 44   Diagnosis : Normal sinus rhythm~Incomplete right bundle branch block~Left anterior fascicular block~Minimal voltage criteria for LVH, may be normal variant~Inferior infarct (cited on or before 23-MAR-2016)~****Abnormal ECG****~When compared with ECG of 20-JUN-2017 11:53,~Incomplete right bundle branch block has replaced Right bundle branch block~Confirmed by ARIA BLOUNT, AZUL ([id number]) on 12/22/2017 3:00:54 PM     
fair minus

## 2024-12-03 NOTE — CONSULT NOTE ADULT - ATTENDING COMMENTS
63 y/o female pmh htn, dm, asthma presents w/ complaint of cough x1 month, right foot pain/swelling x3 weeks and urinary frequency x3 weeks. Now s/p I&D of the R foot with podiatry     Assessment  #Sepsis, fever, leucocytosis, tachycardia     -Pt with cough x1 month, right foot pain/swelling x3 weeks and urinary frequency x3 weeks but no dysuria   -Pt prescribed course of steroids and doxycycline which she completed  -Pt febrile with elevated WBC  -On podiatry exam: Right foot dorsal erythema with fluctuance. No open wounds bilaterally.  -Now s/p I&D of the R foot with podiatry with gouty material/ purulence expressed.        Recommendation:  -Cont Vanc and Zosyn for now. If MRSA swab neg, can d/c Vanc  - monitor vancomycin trough and creatinine to avoid nephrotoxicity and ensure efficacy   -F/U I&D cx   - f/u BCX   -F/U X-ray foot and MR Foot  -podiatry on board.  - glycemic control for appropriate wound healing  -Cont to monitor fever and WBC curve    Plan discussed with consulting team.     Carlie Jay  Please contact through MS Teams   If no response or past 5 pm/weekend call [phone].
per above

## 2024-12-03 NOTE — DIETITIAN INITIAL EVALUATION ADULT - OTHER INFO
Pt seen at bedside. Pt reported good PO intake in house. Per RN flowsheet, Pt with % noted. No food preferences vocalized at this time. Patient denies difficulty chewing or swallowing at present. Pt denies any nausea, vomiting, diarrhea at this time. Unknown last BM. Not noted to be on a bowel regimen. Continue to monitor BMs and document in RN flowsheet. Labs noted for hyperglycemia, hyponatremia, and elevated HgA1c. Pt educated on Consistent Carbohydrate diet and understanding. Endocrinology following for insulin adjustment. RD remains available upon request and will follow up per protocol.

## 2024-12-03 NOTE — DIETITIAN INITIAL EVALUATION ADULT - NSFNSPHYEXAMSKINFT_GEN_A_CORE
Addended by: CLAUDIA SILVA on: 2/26/2020 12:48 PM     Modules accepted: Orders     As per RN flowsheet no PI/DTI at this time.

## 2024-12-03 NOTE — PHYSICAL THERAPY INITIAL EVALUATION ADULT - GENERAL OBSERVATIONS, REHAB EVAL
Pt encountered in semi-supine position in NAD, all lines intact, a&ox4, SPO2 99%, and RN Mustapha aware.

## 2024-12-03 NOTE — CONSULT NOTE ADULT - SUBJECTIVE AND OBJECTIVE BOX
***consult has been received. note is in progress and incomplete without attending attestation***        BRUNO PARKER  [id number]    HISTORY OF PRESENT ILLNESS:  Pt is a 63 y/o female pmh htn, dm, asthma presents w/ complaint of cough x1 month, right foot pain/swelling x3 weeks and urinary frequency x3 weeks. Treated outpatient with doxy and steroids without relief , presenting to ED with above mentioned persisting symptoms  Patient is currently diagnosed with sepsis  pneumonia and cellulitis s/p I&D by podiatry who expressed purulent/ gouty material from her foot. She is currently being treated with broad spectrum abx.  She states she has redness, swelling of her right foot, she denies any drainage but feels like there is fluid there and feels like it's going to burst. She denies any trauma or injury to her foot but states she suffers for neuropathy.    Per podiatry : per exam Right foot dorsal erythema with fluctuance. They performed I and D expressed purulent and gouty looking material.     Rheum ROS    Denies fevers/chills/weight loss/night sweats/alopecia/sinus disease/asthma history/oral ulcers/dysphagia/vision changes/Raynauds/VTE/miscarraiges/sicca symptoms/urinary changes/edema/SOB/joint pain/swelling/jaw claudication/rash/photosensitivity/morning stiffness    Endorses fevers/chills/weight loss/night sweats/alopecia/sinus disease/asthma history/oral ulcers/dysphagia/vision changes/Raynauds/VTE/miscarraiges/sicca symptoms/urinary changes/edema/SOB/joint pain/swelling/jaw claudication/rash/photosensitivity/morning stiffness      MEDICATIONS  (STANDING):  dextrose 5%. 1000 milliLiter(s) (50 mL/Hr) IV Continuous <Continuous>  dextrose 5%. 1000 milliLiter(s) (100 mL/Hr) IV Continuous <Continuous>  dextrose 50% Injectable 25 Gram(s) IV Push once  dextrose 50% Injectable 12.5 Gram(s) IV Push once  dextrose 50% Injectable 25 Gram(s) IV Push once  dextrose Oral Gel 15 Gram(s) Oral once  enoxaparin Injectable 40 milliGRAM(s) SubCutaneous every 12 hours  gabapentin 600 milliGRAM(s) Oral three times a day  glucagon  Injectable 1 milliGRAM(s) IntraMuscular once  insulin glargine Injectable (LANTUS) 26 Unit(s) SubCutaneous at bedtime  insulin lispro (ADMELOG) corrective regimen sliding scale   SubCutaneous three times a day before meals  insulin lispro (ADMELOG) corrective regimen sliding scale   SubCutaneous at bedtime  insulin lispro Injectable (ADMELOG) 24 Unit(s) SubCutaneous three times a day before meals  piperacillin/tazobactam IVPB.. 3.375 Gram(s) IV Intermittent every 8 hours    MEDICATIONS  (PRN):  acetaminophen     Tablet .. 650 milliGRAM(s) Oral every 6 hours PRN Temp greater or equal to 38C (100.4F), Mild Pain (1 - 3)  benzonatate 100 milliGRAM(s) Oral every 8 hours PRN Cough  melatonin 3 milliGRAM(s) Oral at bedtime PRN Insomnia  morphine  - Injectable 2 milliGRAM(s) IV Push every 6 hours PRN Moderate Pain (4 - 6)  morphine  - Injectable 4 milliGRAM(s) IV Push every 6 hours PRN Severe Pain (7 - 10)      Allergies    No Known Allergies    Intolerances        PERTINENT MEDICATION HISTORY:    SOCIAL HISTORY:  OCCUPATION:  TRAVEL HISTORY:    FAMILY HISTORY:      Vital Signs Last 24 Hrs  T(C): 36.4 (03 Dec 2024 05:00), Max: 38.2 (02 Dec 2024 22:37)  T(F): 97.5 (03 Dec 2024 05:00), Max: 100.8 (02 Dec 2024 22:37)  HR: 72 (03 Dec 2024 05:00) (72 - 94)  BP: 130/69 (03 Dec 2024 05:00) (115/62 - 144/66)  BP(mean): --  RR: 17 (03 Dec 2024 05:00) (16 - 18)  SpO2: 99% (03 Dec 2024 05:00) (96% - 100%)    Parameters below as of 03 Dec 2024 05:00  Patient On (Oxygen Delivery Method): room air        Physical Exam:  General: No apparent distress  HEENT: EOMI, MMM  CVS: +S1/S2, RRR, no murmurs/rubs/gallops  Resp: CTA b/l. No crackles/wheezing  GI: Soft, NT/ND +BS  MSK: no swelling/warmth/erythema of the joints of the UE/LE  Neuro: AAOx3  Skin: no visible rashes    LABS:                        10.8   11.55 )-----------( 318      ( 03 Dec 2024 07:00 )             34.2     12-03    133[L]  |  99  |  10  ----------------------------<  308[H]  4.4   |  25  |  0.36[L]    Ca    9.0      03 Dec 2024 07:00  Phos  2.7     12-03  Mg     1.90     12-03    TPro  8.0  /  Alb  3.9  /  TBili  0.5  /  DBili  x   /  AST  12  /  ALT  8   /  AlkPhos  124[H]  12-01      Urinalysis Basic - ( 03 Dec 2024 07:00 )    Color: x / Appearance: x / SG: x / pH: x  Gluc: 308 mg/dL / Ketone: x  / Bili: x / Urobili: x   Blood: x / Protein: x / Nitrite: x   Leuk Esterase: x / RBC: x / WBC x   Sq Epi: x / Non Sq Epi: x / Bacteria: x          Culture - Abscess with Gram Stain (collected 12-02-24 @ 15:52)  Source: .Abscess  Gram Stain (12-02-24 @ 23:58):    Rare polymorphonuclear leukocytes seen per low power field    No organisms seen per oil power field        Rheumatology Work Up    Sedimentation Rate, Erythrocyte: 101 mm/hr *H* [4 - 25] (12-02-24 @ 15:25)  C-Reactive Protein: 109.6 mg/L *H* (12-02-24 @ 15:25)  C-Reactive Protein: 94.2 mg/L *H* (12-02-24 @ 07:19)  Creatine Kinase, Serum: 91 U/L [26 - 192] (09-02-23 @ 15:46)            RADIOLOGY & ADDITIONAL STUDIES:         BRUNO PARKER  [id number]    HISTORY OF PRESENT ILLNESS:  Pt is a 65 y/o female pmh htn, dm, asthma presents w/ complaint of cough x1 month, right foot pain/swelling x3 weeks and urinary frequency x3 weeks. Treated outpatient with doxy and steroids without relief , presenting to ED with above mentioned persisting symptoms  Patient is currently diagnosed with sepsis  pneumonia and cellulitis s/p I&D by podiatry who expressed purulent/ gouty material from her foot. She is currently being treated with broad spectrum abx.  She states she has redness, swelling of her right foot, she denies any drainage but feels like there is fluid there and feels like it's going to burst. She denies any trauma or injury to her foot but states she suffers for neuropathy.    Per podiatry : per exam Right foot dorsal erythema with fluctuance. They performed I and D expressed purulent and gouty looking material.    Rheum hx :  Patient confirms above history. Patient developed foot pain after completion of her steroid and doxy course for her respiratory symptoms prescribed by her urgent car.   She recalls that over three weeks , her right foot pain has remained persistent and has been worsening progressively, without any improvement. She underwent I &D yesterday with drainage of pus, with initial whitish material.   Patient reported that she has right foot pain in her 40s , as a menstruating woman and was seen by her PCP and was told probably has gout, was treated with tylenil, she never had recurrence of the episode.     Rheum ROS    Denies fevers/chills/weight loss/night sweats/alopecia/sinus disease/asthma history/oral ulcers/dysphagia/vision changes/Raynauds/VTE/miscarraiges/sicca symptoms/urinary changes/edema/SOB/joint pain/swelling/jaw claudication/rash/photosensitivity/morning stiffness    Endorses fevers/chills/weight loss/night sweats/alopecia/sinus disease/asthma history/oral ulcers/dysphagia/vision changes/Raynauds/VTE/miscarraiges/sicca symptoms/urinary changes/edema/SOB/joint pain/swelling/jaw claudication/rash/photosensitivity/morning stiffness      MEDICATIONS  (STANDING):  dextrose 5%. 1000 milliLiter(s) (50 mL/Hr) IV Continuous <Continuous>  dextrose 5%. 1000 milliLiter(s) (100 mL/Hr) IV Continuous <Continuous>  dextrose 50% Injectable 25 Gram(s) IV Push once  dextrose 50% Injectable 12.5 Gram(s) IV Push once  dextrose 50% Injectable 25 Gram(s) IV Push once  dextrose Oral Gel 15 Gram(s) Oral once  enoxaparin Injectable 40 milliGRAM(s) SubCutaneous every 12 hours  gabapentin 600 milliGRAM(s) Oral three times a day  glucagon  Injectable 1 milliGRAM(s) IntraMuscular once  insulin glargine Injectable (LANTUS) 26 Unit(s) SubCutaneous at bedtime  insulin lispro (ADMELOG) corrective regimen sliding scale   SubCutaneous three times a day before meals  insulin lispro (ADMELOG) corrective regimen sliding scale   SubCutaneous at bedtime  insulin lispro Injectable (ADMELOG) 24 Unit(s) SubCutaneous three times a day before meals  piperacillin/tazobactam IVPB.. 3.375 Gram(s) IV Intermittent every 8 hours    MEDICATIONS  (PRN):  acetaminophen     Tablet .. 650 milliGRAM(s) Oral every 6 hours PRN Temp greater or equal to 38C (100.4F), Mild Pain (1 - 3)  benzonatate 100 milliGRAM(s) Oral every 8 hours PRN Cough  melatonin 3 milliGRAM(s) Oral at bedtime PRN Insomnia  morphine  - Injectable 2 milliGRAM(s) IV Push every 6 hours PRN Moderate Pain (4 - 6)  morphine  - Injectable 4 milliGRAM(s) IV Push every 6 hours PRN Severe Pain (7 - 10)      Allergies    No Known Allergies    Intolerances        PERTINENT MEDICATION HISTORY:    SOCIAL HISTORY:  OCCUPATION:  TRAVEL HISTORY:    FAMILY HISTORY:      Vital Signs Last 24 Hrs  T(C): 36.4 (03 Dec 2024 05:00), Max: 38.2 (02 Dec 2024 22:37)  T(F): 97.5 (03 Dec 2024 05:00), Max: 100.8 (02 Dec 2024 22:37)  HR: 72 (03 Dec 2024 05:00) (72 - 94)  BP: 130/69 (03 Dec 2024 05:00) (115/62 - 144/66)  BP(mean): --  RR: 17 (03 Dec 2024 05:00) (16 - 18)  SpO2: 99% (03 Dec 2024 05:00) (96% - 100%)    Parameters below as of 03 Dec 2024 05:00  Patient On (Oxygen Delivery Method): room air        Physical Exam:  General: No apparent distress  HEENT: EOMI, MMM  CVS: +S1/S2, RRR, no murmurs/rubs/gallops  Resp: CTA b/l. No crackles/wheezing  GI: Soft, NT/ND +BS  MSK: no swelling/warmth/erythema of the joints of the UE/LE  Neuro: AAOx3  Skin: no visible rashes    LABS:                        10.8   11.55 )-----------( 318      ( 03 Dec 2024 07:00 )             34.2     12-03    133[L]  |  99  |  10  ----------------------------<  308[H]  4.4   |  25  |  0.36[L]    Ca    9.0      03 Dec 2024 07:00  Phos  2.7     12-03  Mg     1.90     12-03    TPro  8.0  /  Alb  3.9  /  TBili  0.5  /  DBili  x   /  AST  12  /  ALT  8   /  AlkPhos  124[H]  12-01      Urinalysis Basic - ( 03 Dec 2024 07:00 )    Color: x / Appearance: x / SG: x / pH: x  Gluc: 308 mg/dL / Ketone: x  / Bili: x / Urobili: x   Blood: x / Protein: x / Nitrite: x   Leuk Esterase: x / RBC: x / WBC x   Sq Epi: x / Non Sq Epi: x / Bacteria: x          Culture - Abscess with Gram Stain (collected 12-02-24 @ 15:52)  Source: .Abscess  Gram Stain (12-02-24 @ 23:58):    Rare polymorphonuclear leukocytes seen per low power field    No organisms seen per oil power field        Rheumatology Work Up    Sedimentation Rate, Erythrocyte: 101 mm/hr *H* [4 - 25] (12-02-24 @ 15:25)  C-Reactive Protein: 109.6 mg/L *H* (12-02-24 @ 15:25)  C-Reactive Protein: 94.2 mg/L *H* (12-02-24 @ 07:19)  Creatine Kinase, Serum: 91 U/L [26 - 192] (09-02-23 @ 15:46)            RADIOLOGY & ADDITIONAL STUDIES:         BRUNO PARKER  [id number]    HISTORY OF PRESENT ILLNESS:  Pt is a 65 y/o female pmh htn, dm, asthma presents w/ complaint of cough x1 month, right foot pain/swelling x3 weeks and urinary frequency x3 weeks. Treated outpatient with doxy and steroids without relief , presenting to ED with above mentioned persisting symptoms  Patient is currently diagnosed with sepsis  pneumonia and cellulitis s/p I&D by podiatry who expressed purulent/ gouty material from her foot. She is currently being treated with broad spectrum abx.  She states she has redness, swelling of her right foot, she denies any drainage but feels like there is fluid there and feels like it's going to burst. She denies any trauma or injury to her foot but states she suffers for neuropathy.    Per podiatry : per exam Right foot dorsal erythema with fluctuance. They performed I and D expressed purulent and gouty looking material.    Rheum hx :  Patient confirms above history. Patient developed foot pain after completion of her steroid and doxy course for her respiratory symptoms prescribed by her urgent car.   She recalls that over three weeks , her right foot pain has remained persistent and has been worsening progressively, without any improvement. She underwent I &D yesterday with drainage of pus, with initial whitish material.   Patient reported that she has right foot pain in her 40s , as a menstruating woman and was seen by her PCP and was told probably has gout, was treated with tylenil, she never had recurrence of the episode.     Rheum ROS  Denies previous polyarticular pain, tophus, CKD, elevated uric acid, recurrent foot pain, trauma,. AM stiffness      MEDICATIONS  (STANDING):  dextrose 5%. 1000 milliLiter(s) (50 mL/Hr) IV Continuous <Continuous>  dextrose 5%. 1000 milliLiter(s) (100 mL/Hr) IV Continuous <Continuous>  dextrose 50% Injectable 25 Gram(s) IV Push once  dextrose 50% Injectable 12.5 Gram(s) IV Push once  dextrose 50% Injectable 25 Gram(s) IV Push once  dextrose Oral Gel 15 Gram(s) Oral once  enoxaparin Injectable 40 milliGRAM(s) SubCutaneous every 12 hours  gabapentin 600 milliGRAM(s) Oral three times a day  glucagon  Injectable 1 milliGRAM(s) IntraMuscular once  insulin glargine Injectable (LANTUS) 26 Unit(s) SubCutaneous at bedtime  insulin lispro (ADMELOG) corrective regimen sliding scale   SubCutaneous three times a day before meals  insulin lispro (ADMELOG) corrective regimen sliding scale   SubCutaneous at bedtime  insulin lispro Injectable (ADMELOG) 24 Unit(s) SubCutaneous three times a day before meals  piperacillin/tazobactam IVPB.. 3.375 Gram(s) IV Intermittent every 8 hours    MEDICATIONS  (PRN):  acetaminophen     Tablet .. 650 milliGRAM(s) Oral every 6 hours PRN Temp greater or equal to 38C (100.4F), Mild Pain (1 - 3)  benzonatate 100 milliGRAM(s) Oral every 8 hours PRN Cough  melatonin 3 milliGRAM(s) Oral at bedtime PRN Insomnia  morphine  - Injectable 2 milliGRAM(s) IV Push every 6 hours PRN Moderate Pain (4 - 6)  morphine  - Injectable 4 milliGRAM(s) IV Push every 6 hours PRN Severe Pain (7 - 10)      Allergies    No Known Allergies    Intolerances      Vital Signs Last 24 Hrs  T(C): 36.4 (03 Dec 2024 05:00), Max: 38.2 (02 Dec 2024 22:37)  T(F): 97.5 (03 Dec 2024 05:00), Max: 100.8 (02 Dec 2024 22:37)  HR: 72 (03 Dec 2024 05:00) (72 - 94)  BP: 130/69 (03 Dec 2024 05:00) (115/62 - 144/66)  BP(mean): --  RR: 17 (03 Dec 2024 05:00) (16 - 18)  SpO2: 99% (03 Dec 2024 05:00) (96% - 100%)    Parameters below as of 03 Dec 2024 05:00  Patient On (Oxygen Delivery Method): room air        Physical Exam:  General: No apparent distress  HEENT: EOMI, MMM  CVS: +S1/S2, RRR, no murmurs/rubs/gallops  Resp: CTA b/l. No crackles/wheezing  GI: Soft, NT/ND +BS  MSK: right foot image obtained and reviewed from podiatry .  Neuro: AAOx3  Skin: no visible rashes    LABS:                        10.8   11.55 )-----------( 318      ( 03 Dec 2024 07:00 )             34.2     12-03    133[L]  |  99  |  10  ----------------------------<  308[H]  4.4   |  25  |  0.36[L]    Ca    9.0      03 Dec 2024 07:00  Phos  2.7     12-03  Mg     1.90     12-03    TPro  8.0  /  Alb  3.9  /  TBili  0.5  /  DBili  x   /  AST  12  /  ALT  8   /  AlkPhos  124[H]  12-01      Urinalysis Basic - ( 03 Dec 2024 07:00 )    Color: x / Appearance: x / SG: x / pH: x  Gluc: 308 mg/dL / Ketone: x  / Bili: x / Urobili: x   Blood: x / Protein: x / Nitrite: x   Leuk Esterase: x / RBC: x / WBC x   Sq Epi: x / Non Sq Epi: x / Bacteria: x          Culture - Abscess with Gram Stain (collected 12-02-24 @ 15:52)  Source: .Abscess  Gram Stain (12-02-24 @ 23:58):    Rare polymorphonuclear leukocytes seen per low power field    No organisms seen per oil power field        Rheumatology Work Up    Sedimentation Rate, Erythrocyte: 101 mm/hr *H* [4 - 25] (12-02-24 @ 15:25)  C-Reactive Protein: 109.6 mg/L *H* (12-02-24 @ 15:25)  C-Reactive Protein: 94.2 mg/L *H* (12-02-24 @ 07:19)  Creatine Kinase, Serum: 91 U/L [26 - 192] (09-02-23 @ 15:46)      RADIOLOGY & ADDITIONAL STUDIES:         BRUNO PARKER  [id number]    HISTORY OF PRESENT ILLNESS:  Pt is a 63 y/o female pmh htn, dm, asthma presents w/ complaint of cough x1 month, right foot pain/swelling x3 weeks and urinary frequency x3 weeks. Treated outpatient with doxy and steroids without relief , presenting to ED with above mentioned persisting symptoms  Patient is currently diagnosed with sepsis  pneumonia and cellulitis s/p I&D by podiatry who expressed purulent/ gouty material from her foot. She is currently being treated with broad spectrum abx.  She states she has redness, swelling of her right foot, she denies any drainage but feels like there is fluid there and feels like it's going to burst. She denies any trauma or injury to her foot but states she suffers for neuropathy.    Per podiatry : per exam Right foot dorsal erythema with fluctuance. They performed I and D expressed purulent and gouty looking material.    Rheum hx :  Patient confirms above history. Patient developed foot pain after completion of her steroid and doxy course for her respiratory symptoms prescribed by her urgent car.   She recalls that over three weeks , her right foot pain has remained persistent and has been worsening progressively, without any improvement. She underwent I &D yesterday with drainage of pus, with initial whitish material.   Patient reported that she has right foot pain in her 40s , as a menstruating woman and was seen by her PCP and was told probably has gout, was treated with tylenil, she never had recurrence of the episode.     Rheum ROS  Denies previous polyarticular pain, tophus, CKD, elevated uric acid, recurrent foot pain, trauma,. AM stiffness      MEDICATIONS  (STANDING):  dextrose 5%. 1000 milliLiter(s) (50 mL/Hr) IV Continuous <Continuous>  dextrose 5%. 1000 milliLiter(s) (100 mL/Hr) IV Continuous <Continuous>  dextrose 50% Injectable 25 Gram(s) IV Push once  dextrose 50% Injectable 12.5 Gram(s) IV Push once  dextrose 50% Injectable 25 Gram(s) IV Push once  dextrose Oral Gel 15 Gram(s) Oral once  enoxaparin Injectable 40 milliGRAM(s) SubCutaneous every 12 hours  gabapentin 600 milliGRAM(s) Oral three times a day  glucagon  Injectable 1 milliGRAM(s) IntraMuscular once  insulin glargine Injectable (LANTUS) 26 Unit(s) SubCutaneous at bedtime  insulin lispro (ADMELOG) corrective regimen sliding scale   SubCutaneous three times a day before meals  insulin lispro (ADMELOG) corrective regimen sliding scale   SubCutaneous at bedtime  insulin lispro Injectable (ADMELOG) 24 Unit(s) SubCutaneous three times a day before meals  piperacillin/tazobactam IVPB.. 3.375 Gram(s) IV Intermittent every 8 hours    MEDICATIONS  (PRN):  acetaminophen     Tablet .. 650 milliGRAM(s) Oral every 6 hours PRN Temp greater or equal to 38C (100.4F), Mild Pain (1 - 3)  benzonatate 100 milliGRAM(s) Oral every 8 hours PRN Cough  melatonin 3 milliGRAM(s) Oral at bedtime PRN Insomnia  morphine  - Injectable 2 milliGRAM(s) IV Push every 6 hours PRN Moderate Pain (4 - 6)  morphine  - Injectable 4 milliGRAM(s) IV Push every 6 hours PRN Severe Pain (7 - 10)      Allergies    No Known Allergies    Intolerances      Vital Signs Last 24 Hrs  T(C): 36.4 (03 Dec 2024 05:00), Max: 38.2 (02 Dec 2024 22:37)  T(F): 97.5 (03 Dec 2024 05:00), Max: 100.8 (02 Dec 2024 22:37)  HR: 72 (03 Dec 2024 05:00) (72 - 94)  BP: 130/69 (03 Dec 2024 05:00) (115/62 - 144/66)  BP(mean): --  RR: 17 (03 Dec 2024 05:00) (16 - 18)  SpO2: 99% (03 Dec 2024 05:00) (96% - 100%)    Parameters below as of 03 Dec 2024 05:00  Patient On (Oxygen Delivery Method): room air        Physical Exam:  General: No apparent distress  HEENT: EOMI, MMM  CVS: +S1/S2, RRR, no murmurs/rubs/gallops  Resp: CTA b/l. No crackles/wheezing  GI: Soft, NT/ND +BS  MSK: right foot image obtained and reviewed from podiatry- erythematous rash w purulence once incised  Neuro: AAOx3  Skin: no visible rashes    LABS:                        10.8   11.55 )-----------( 318      ( 03 Dec 2024 07:00 )             34.2     12-03    133[L]  |  99  |  10  ----------------------------<  308[H]  4.4   |  25  |  0.36[L]    Ca    9.0      03 Dec 2024 07:00  Phos  2.7     12-03  Mg     1.90     12-03    TPro  8.0  /  Alb  3.9  /  TBili  0.5  /  DBili  x   /  AST  12  /  ALT  8   /  AlkPhos  124[H]  12-01      Urinalysis Basic - ( 03 Dec 2024 07:00 )    Color: x / Appearance: x / SG: x / pH: x  Gluc: 308 mg/dL / Ketone: x  / Bili: x / Urobili: x   Blood: x / Protein: x / Nitrite: x   Leuk Esterase: x / RBC: x / WBC x   Sq Epi: x / Non Sq Epi: x / Bacteria: x          Culture - Abscess with Gram Stain (collected 12-02-24 @ 15:52)  Source: .Abscess  Gram Stain (12-02-24 @ 23:58):    Rare polymorphonuclear leukocytes seen per low power field    No organisms seen per oil power field        Rheumatology Work Up    Sedimentation Rate, Erythrocyte: 101 mm/hr *H* [4 - 25] (12-02-24 @ 15:25)  C-Reactive Protein: 109.6 mg/L *H* (12-02-24 @ 15:25)  C-Reactive Protein: 94.2 mg/L *H* (12-02-24 @ 07:19)  Creatine Kinase, Serum: 91 U/L [26 - 192] (09-02-23 @ 15:46)      RADIOLOGY & ADDITIONAL STUDIES:

## 2024-12-04 LAB
CHOLEST SERPL-MCNC: 154 MG/DL — SIGNIFICANT CHANGE UP
GLUCOSE BLDC GLUCOMTR-MCNC: 230 MG/DL — HIGH (ref 70–99)
GLUCOSE BLDC GLUCOMTR-MCNC: 262 MG/DL — HIGH (ref 70–99)
GLUCOSE BLDC GLUCOMTR-MCNC: 287 MG/DL — HIGH (ref 70–99)
HDLC SERPL-MCNC: 52 MG/DL — SIGNIFICANT CHANGE UP
LIPID PNL WITH DIRECT LDL SERPL: 86 MG/DL — SIGNIFICANT CHANGE UP
NON HDL CHOLESTEROL: 102 MG/DL — SIGNIFICANT CHANGE UP
TRIGL SERPL-MCNC: 87 MG/DL — SIGNIFICANT CHANGE UP

## 2024-12-04 PROCEDURE — 73718 MRI LOWER EXTREMITY W/O DYE: CPT | Mod: 26,RT

## 2024-12-04 PROCEDURE — 99232 SBSQ HOSP IP/OBS MODERATE 35: CPT

## 2024-12-04 RX ORDER — ORAL SEMAGLUTIDE 7 MG/1
0.25 TABLET ORAL
Qty: 4 | Refills: 0
Start: 2024-12-04 | End: 2025-01-02

## 2024-12-04 RX ORDER — POLYETHYLENE GLYCOL 3350 17 G/17G
17 POWDER, FOR SOLUTION ORAL DAILY
Refills: 0 | Status: DISCONTINUED | OUTPATIENT
Start: 2024-12-04 | End: 2024-12-06

## 2024-12-04 RX ORDER — SENNOSIDES 8.6 MG
2 TABLET ORAL AT BEDTIME
Refills: 0 | Status: DISCONTINUED | OUTPATIENT
Start: 2024-12-04 | End: 2024-12-06

## 2024-12-04 RX ADMIN — Medication 4 MILLIGRAM(S): at 15:41

## 2024-12-04 RX ADMIN — Medication 4 MILLIGRAM(S): at 22:21

## 2024-12-04 RX ADMIN — INSULIN GLARGINE 31 UNIT(S): 100 INJECTION, SOLUTION SUBCUTANEOUS at 21:45

## 2024-12-04 RX ADMIN — GABAPENTIN 600 MILLIGRAM(S): 300 CAPSULE ORAL at 13:51

## 2024-12-04 RX ADMIN — Medication 4 MILLIGRAM(S): at 14:55

## 2024-12-04 RX ADMIN — IPRATROPIUM BROMIDE AND ALBUTEROL SULFATE 3 MILLILITER(S): 2.5; .5 SOLUTION RESPIRATORY (INHALATION) at 14:52

## 2024-12-04 RX ADMIN — ENOXAPARIN SODIUM 40 MILLIGRAM(S): 30 INJECTION SUBCUTANEOUS at 05:48

## 2024-12-04 RX ADMIN — Medication 28 UNIT(S): at 11:49

## 2024-12-04 RX ADMIN — Medication 4 MILLIGRAM(S): at 01:04

## 2024-12-04 RX ADMIN — IPRATROPIUM BROMIDE AND ALBUTEROL SULFATE 3 MILLILITER(S): 2.5; .5 SOLUTION RESPIRATORY (INHALATION) at 03:25

## 2024-12-04 RX ADMIN — GABAPENTIN 600 MILLIGRAM(S): 300 CAPSULE ORAL at 05:43

## 2024-12-04 RX ADMIN — IPRATROPIUM BROMIDE AND ALBUTEROL SULFATE 3 MILLILITER(S): 2.5; .5 SOLUTION RESPIRATORY (INHALATION) at 20:56

## 2024-12-04 RX ADMIN — Medication 6: at 17:26

## 2024-12-04 RX ADMIN — Medication 6: at 11:49

## 2024-12-04 RX ADMIN — ROSUVASTATIN CALCIUM 20 MILLIGRAM(S): 5 TABLET, FILM COATED ORAL at 21:54

## 2024-12-04 RX ADMIN — ENOXAPARIN SODIUM 40 MILLIGRAM(S): 30 INJECTION SUBCUTANEOUS at 17:28

## 2024-12-04 RX ADMIN — Medication 4 MILLIGRAM(S): at 01:34

## 2024-12-04 RX ADMIN — PIPERACILLIN SODIUM AND TAZOBACTAM SODIUM 25 GRAM(S): 4; .5 INJECTION, POWDER, LYOPHILIZED, FOR SOLUTION INTRAVENOUS at 16:36

## 2024-12-04 RX ADMIN — PIPERACILLIN SODIUM AND TAZOBACTAM SODIUM 25 GRAM(S): 4; .5 INJECTION, POWDER, LYOPHILIZED, FOR SOLUTION INTRAVENOUS at 05:45

## 2024-12-04 RX ADMIN — Medication 81 MILLIGRAM(S): at 13:51

## 2024-12-04 RX ADMIN — Medication 28 UNIT(S): at 17:26

## 2024-12-04 RX ADMIN — GABAPENTIN 600 MILLIGRAM(S): 300 CAPSULE ORAL at 23:09

## 2024-12-04 RX ADMIN — Medication 4 MILLIGRAM(S): at 21:51

## 2024-12-04 RX ADMIN — PIPERACILLIN SODIUM AND TAZOBACTAM SODIUM 25 GRAM(S): 4; .5 INJECTION, POWDER, LYOPHILIZED, FOR SOLUTION INTRAVENOUS at 21:57

## 2024-12-04 NOTE — PHARMACOTHERAPY INTERVENTION NOTE - COMMENTS
Pt educated on A1c, goal A1c, basal/bolus insulin pen administration, Ozempic pen administration, hypoglycemia and treatment, healthy plate, sources of carbohydrates, goal blood glucose, and when to check BG, pt was able to turn to correct insulin/Ozempic dose but does have difficulty seeing (brings pen up to her eye to see) and verbalized understanding. Counseled patient on how to use Ozempic, how to prime the pen (first time only), instructions to take 0.25 mg once a week x 4 weeks and then on week 5 if tolerating, increase to 0.5 mg once a week (which is effective dose for diabetes), potential side effects, proper storage, where to inject, and sharps disposal. States she was on Mounjaro for 1-2 years and then developed breast pain (has not had mammogram). Counseled pt on where to inject insulin (abdomen, outer thighs - only uses her thighs), rotating injection site to prevent lipodystrophy currently not really rotating site), proper insulin storage, and sharps disposal. Patient is willing to try Dexcom again (says it fell off after one hour) - discussed ways to help it stay on longer (use the outer patch, wipe with Skin Tac wipes first), she is willing to try it again. Pt encouraged for outpt f/u with medicine (Dr. Demetrius Huang) and endocrine (says [facility] at [place] may be too far, advised her to call her insurance for local endocrinologist in-network).    Pt educated on A1c, goal A1c, basal/bolus insulin pen administration, Ozempic pen administration, hypoglycemia and treatment, healthy plate, sources of carbohydrates, goal blood glucose, and when to check BG, pt was able to turn to correct insulin/Ozempic dose but does have difficulty seeing (brings pen up to her eye to see) and verbalized understanding. Counseled patient on how to use Ozempic, how to prime the pen (first time only), instructions to take 0.25 mg once a week x 4 weeks and then on week 5 if tolerating, increase to 0.5 mg once a week (which is effective dose for diabetes), potential side effects, proper storage, where to inject, and sharps disposal. States she was on Mounjaro for 1-2 years and then developed breast pain (has not had mammogram). Counseled pt on where to inject insulin (abdomen, outer thighs - only uses her thighs), rotating injection site to prevent lipodystrophy currently not really rotating site), proper insulin storage, and sharps disposal. Patient is willing to try Dexcom again (says it fell off after one hour) - discussed ways to help it stay on longer (use the outer patch, wipe with Skin Tac wipes first), she is willing to try it again. Pt encouraged for outpt f/u with medicine (Dr. Demetrius Huang) and endocrine (says [facility] at [place] may be too far, advised her to call her insurance for local endocrinologist in-network).       12/6/24: Reviewed Ozempic pen with good return demonstration - she has trouble seeing but able to perform return demonstration when the dose window is very close to her eye. She verbalized understanding with the titration, side effects, when to call her primary care provider, and storage. Had her show me the inpatient doses of insulin - she overturned by one unit for the basal insulin but corrected with my guidance. She should have someone double check her or use reading glasses (hers broke). Patient has a lot of Dexcom supplies at home but no one can bring them to the hospital for teaching prior to discharge (her phone broke so cannot teach with sample). She is interested in going to [facility] at [place] for the nutritionist to  and set up the Dexcom for her (if not, to bring to her primary care provider for help applying and setting it up). Suspect the Dexcom was falling off due to improper technique.  Reviewed goal BG, when to check fingersticks, hypoglycemia and treatment, and when to contact her provider.    Pt educated on A1c, goal A1c, basal/bolus insulin pen administration, Ozempic pen administration, hypoglycemia and treatment, healthy plate, sources of carbohydrates, goal blood glucose, and when to check BG, pt was able to turn to correct insulin/Ozempic dose but does have difficulty seeing (brings pen up to her eye to see) and verbalized understanding. Counseled patient on how to use Ozempic, how to prime the pen (first time only), instructions to take 0.25 mg once a week x 4 weeks and then on week 5 if tolerating, increase to 0.5 mg once a week (which is effective dose for diabetes), potential side effects, proper storage, where to inject, and sharps disposal. States she was on Mounjaro for 1-2 years and then developed breast pain (has not had mammogram). Counseled pt on where to inject insulin (abdomen, outer thighs - only uses her thighs), rotating injection site to prevent lipodystrophy currently not really rotating site), proper insulin storage, and sharps disposal. Patient is willing to try Dexcom again (says it fell off after one hour) - discussed ways to help it stay on longer (use the outer patch, wipe with Skin Tac wipes first), she is willing to try it again. Pt encouraged for outpt f/u with medicine (Dr. Demetrius Huang) and endocrine (says [facility] at [place] may be too far, advised her to call her insurance for local endocrinologist in-network).       12/6/24: Reviewed Ozempic pen with good return demonstration - she has trouble seeing but able to perform return demonstration when the dose window is very close to her eye. She verbalized understanding with the titration, side effects, when to call her primary care provider, and storage. Had her show me the inpatient doses of insulin - she overturned by one unit for the basal insulin but corrected with my guidance. Reviewed when to administer and when to hold pre-meal insulin. She should have someone double check her or use reading glasses (hers broke). Patient has a lot of Dexcom supplies at home but no one can bring them to the hospital for teaching prior to discharge (her phone broke so cannot teach with sample). She is interested in going to [facility] at [place] for the nutritionist to  and set up the Dexcom for her (if not, to bring to her primary care provider for help applying and setting it up). Suspect the Dexcom was falling off due to improper technique.  Reviewed goal BG, when to check fingersticks, hypoglycemia and treatment, and when to contact her provider.

## 2024-12-05 LAB
ANION GAP SERPL CALC-SCNC: 10 MMOL/L — SIGNIFICANT CHANGE UP (ref 7–14)
BUN SERPL-MCNC: 9 MG/DL — SIGNIFICANT CHANGE UP (ref 7–23)
CALCIUM SERPL-MCNC: 9.1 MG/DL — SIGNIFICANT CHANGE UP (ref 8.4–10.5)
CHLORIDE SERPL-SCNC: 103 MMOL/L — SIGNIFICANT CHANGE UP (ref 98–107)
CO2 SERPL-SCNC: 26 MMOL/L — SIGNIFICANT CHANGE UP (ref 22–31)
CREAT SERPL-MCNC: 0.37 MG/DL — LOW (ref 0.5–1.3)
EGFR: 113 ML/MIN/1.73M2 — SIGNIFICANT CHANGE UP
GLUCOSE BLDC GLUCOMTR-MCNC: 115 MG/DL — HIGH (ref 70–99)
GLUCOSE BLDC GLUCOMTR-MCNC: 136 MG/DL — HIGH (ref 70–99)
GLUCOSE BLDC GLUCOMTR-MCNC: 263 MG/DL — HIGH (ref 70–99)
GLUCOSE BLDC GLUCOMTR-MCNC: 288 MG/DL — HIGH (ref 70–99)
GLUCOSE SERPL-MCNC: 225 MG/DL — HIGH (ref 70–99)
HCT VFR BLD CALC: 31 % — LOW (ref 34.5–45)
HGB BLD-MCNC: 10.1 G/DL — LOW (ref 11.5–15.5)
MAGNESIUM SERPL-MCNC: 2 MG/DL — SIGNIFICANT CHANGE UP (ref 1.6–2.6)
MCHC RBC-ENTMCNC: 30.1 PG — SIGNIFICANT CHANGE UP (ref 27–34)
MCHC RBC-ENTMCNC: 32.6 G/DL — SIGNIFICANT CHANGE UP (ref 32–36)
MCV RBC AUTO: 92.3 FL — SIGNIFICANT CHANGE UP (ref 80–100)
NRBC # BLD: 0 /100 WBCS — SIGNIFICANT CHANGE UP (ref 0–0)
NRBC # FLD: 0 K/UL — SIGNIFICANT CHANGE UP (ref 0–0)
PHOSPHATE SERPL-MCNC: 2.9 MG/DL — SIGNIFICANT CHANGE UP (ref 2.5–4.5)
PLATELET # BLD AUTO: 319 K/UL — SIGNIFICANT CHANGE UP (ref 150–400)
POTASSIUM SERPL-MCNC: 3.9 MMOL/L — SIGNIFICANT CHANGE UP (ref 3.5–5.3)
POTASSIUM SERPL-SCNC: 3.9 MMOL/L — SIGNIFICANT CHANGE UP (ref 3.5–5.3)
RBC # BLD: 3.36 M/UL — LOW (ref 3.8–5.2)
RBC # FLD: 12.4 % — SIGNIFICANT CHANGE UP (ref 10.3–14.5)
SODIUM SERPL-SCNC: 139 MMOL/L — SIGNIFICANT CHANGE UP (ref 135–145)
WBC # BLD: 6.99 K/UL — SIGNIFICANT CHANGE UP (ref 3.8–10.5)
WBC # FLD AUTO: 6.99 K/UL — SIGNIFICANT CHANGE UP (ref 3.8–10.5)

## 2024-12-05 PROCEDURE — 99232 SBSQ HOSP IP/OBS MODERATE 35: CPT

## 2024-12-05 PROCEDURE — 73630 X-RAY EXAM OF FOOT: CPT | Mod: 26,RT

## 2024-12-05 RX ORDER — ORAL SEMAGLUTIDE 7 MG/1
0.25 TABLET ORAL
Qty: 4 | Refills: 0
Start: 2024-12-05 | End: 2025-01-03

## 2024-12-05 RX ORDER — INSULIN GLARGINE 100 [IU]/ML
36 INJECTION, SOLUTION SUBCUTANEOUS AT BEDTIME
Refills: 0 | Status: DISCONTINUED | OUTPATIENT
Start: 2024-12-05 | End: 2024-12-06

## 2024-12-05 RX ORDER — AMPICILLIN AND SULBACTAM 1; .5 G/1; G/1
3 INJECTION, POWDER, FOR SOLUTION INTRAVENOUS EVERY 6 HOURS
Refills: 0 | Status: DISCONTINUED | OUTPATIENT
Start: 2024-12-05 | End: 2024-12-06

## 2024-12-05 RX ORDER — OXYCODONE HYDROCHLORIDE 30 MG/1
2.5 TABLET ORAL EVERY 6 HOURS
Refills: 0 | Status: DISCONTINUED | OUTPATIENT
Start: 2024-12-05 | End: 2024-12-06

## 2024-12-05 RX ORDER — AMPICILLIN AND SULBACTAM 1; .5 G/1; G/1
INJECTION, POWDER, FOR SOLUTION INTRAVENOUS
Refills: 0 | Status: DISCONTINUED | OUTPATIENT
Start: 2024-12-05 | End: 2024-12-06

## 2024-12-05 RX ORDER — OXYCODONE HYDROCHLORIDE 30 MG/1
5 TABLET ORAL EVERY 6 HOURS
Refills: 0 | Status: DISCONTINUED | OUTPATIENT
Start: 2024-12-05 | End: 2024-12-06

## 2024-12-05 RX ORDER — AMPICILLIN AND SULBACTAM 1; .5 G/1; G/1
3 INJECTION, POWDER, FOR SOLUTION INTRAVENOUS ONCE
Refills: 0 | Status: COMPLETED | OUTPATIENT
Start: 2024-12-05 | End: 2024-12-05

## 2024-12-05 RX ADMIN — Medication 6: at 12:46

## 2024-12-05 RX ADMIN — ENOXAPARIN SODIUM 40 MILLIGRAM(S): 30 INJECTION SUBCUTANEOUS at 05:57

## 2024-12-05 RX ADMIN — INSULIN GLARGINE 36 UNIT(S): 100 INJECTION, SOLUTION SUBCUTANEOUS at 23:15

## 2024-12-05 RX ADMIN — GABAPENTIN 600 MILLIGRAM(S): 300 CAPSULE ORAL at 22:12

## 2024-12-05 RX ADMIN — Medication 32 UNIT(S): at 12:47

## 2024-12-05 RX ADMIN — Medication 28 UNIT(S): at 08:29

## 2024-12-05 RX ADMIN — Medication 1 TABLET(S): at 08:27

## 2024-12-05 RX ADMIN — Medication 81 MILLIGRAM(S): at 12:45

## 2024-12-05 RX ADMIN — GABAPENTIN 600 MILLIGRAM(S): 300 CAPSULE ORAL at 05:55

## 2024-12-05 RX ADMIN — IPRATROPIUM BROMIDE AND ALBUTEROL SULFATE 3 MILLILITER(S): 2.5; .5 SOLUTION RESPIRATORY (INHALATION) at 21:54

## 2024-12-05 RX ADMIN — Medication 1 TABLET(S): at 18:03

## 2024-12-05 RX ADMIN — ROSUVASTATIN CALCIUM 20 MILLIGRAM(S): 5 TABLET, FILM COATED ORAL at 22:12

## 2024-12-05 RX ADMIN — AMPICILLIN AND SULBACTAM 200 GRAM(S): 1; .5 INJECTION, POWDER, FOR SOLUTION INTRAVENOUS at 12:46

## 2024-12-05 RX ADMIN — Medication 4 MILLIGRAM(S): at 08:31

## 2024-12-05 RX ADMIN — AMPICILLIN AND SULBACTAM 200 GRAM(S): 1; .5 INJECTION, POWDER, FOR SOLUTION INTRAVENOUS at 06:28

## 2024-12-05 RX ADMIN — IPRATROPIUM BROMIDE AND ALBUTEROL SULFATE 3 MILLILITER(S): 2.5; .5 SOLUTION RESPIRATORY (INHALATION) at 03:42

## 2024-12-05 RX ADMIN — IPRATROPIUM BROMIDE AND ALBUTEROL SULFATE 3 MILLILITER(S): 2.5; .5 SOLUTION RESPIRATORY (INHALATION) at 09:26

## 2024-12-05 RX ADMIN — GABAPENTIN 600 MILLIGRAM(S): 300 CAPSULE ORAL at 15:27

## 2024-12-05 RX ADMIN — Medication 2 MILLIGRAM(S): at 00:56

## 2024-12-05 RX ADMIN — PIPERACILLIN SODIUM AND TAZOBACTAM SODIUM 25 GRAM(S): 4; .5 INJECTION, POWDER, LYOPHILIZED, FOR SOLUTION INTRAVENOUS at 05:53

## 2024-12-05 RX ADMIN — AMPICILLIN AND SULBACTAM 200 GRAM(S): 1; .5 INJECTION, POWDER, FOR SOLUTION INTRAVENOUS at 23:13

## 2024-12-05 RX ADMIN — Medication 30 UNIT(S): at 18:02

## 2024-12-05 RX ADMIN — Medication 6: at 08:27

## 2024-12-05 RX ADMIN — ENOXAPARIN SODIUM 40 MILLIGRAM(S): 30 INJECTION SUBCUTANEOUS at 18:06

## 2024-12-05 RX ADMIN — AMPICILLIN AND SULBACTAM 200 GRAM(S): 1; .5 INJECTION, POWDER, FOR SOLUTION INTRAVENOUS at 18:06

## 2024-12-05 RX ADMIN — Medication 2 MILLIGRAM(S): at 01:26

## 2024-12-05 RX ADMIN — IPRATROPIUM BROMIDE AND ALBUTEROL SULFATE 3 MILLILITER(S): 2.5; .5 SOLUTION RESPIRATORY (INHALATION) at 16:33

## 2024-12-05 NOTE — PROGRESS NOTE ADULT - MUSCULOSKELETAL
strength 5/5 bilateral upper extremities
ROM intact/strength 5/5 bilateral upper extremities
ROM intact/strength 5/5 bilateral upper extremities/strength 5/5 bilateral lower extremities

## 2024-12-06 ENCOUNTER — TRANSCRIPTION ENCOUNTER (OUTPATIENT)
Age: 64
End: 2024-12-06

## 2024-12-06 VITALS — OXYGEN SATURATION: 96 %

## 2024-12-06 LAB
ANION GAP SERPL CALC-SCNC: 11 MMOL/L — SIGNIFICANT CHANGE UP (ref 7–14)
BUN SERPL-MCNC: 9 MG/DL — SIGNIFICANT CHANGE UP (ref 7–23)
CALCIUM SERPL-MCNC: 9.1 MG/DL — SIGNIFICANT CHANGE UP (ref 8.4–10.5)
CHLORIDE SERPL-SCNC: 104 MMOL/L — SIGNIFICANT CHANGE UP (ref 98–107)
CO2 SERPL-SCNC: 23 MMOL/L — SIGNIFICANT CHANGE UP (ref 22–31)
CREAT SERPL-MCNC: 0.31 MG/DL — LOW (ref 0.5–1.3)
EGFR: 117 ML/MIN/1.73M2 — SIGNIFICANT CHANGE UP
GLUCOSE BLDC GLUCOMTR-MCNC: 224 MG/DL — HIGH (ref 70–99)
GLUCOSE BLDC GLUCOMTR-MCNC: 264 MG/DL — HIGH (ref 70–99)
GLUCOSE SERPL-MCNC: 239 MG/DL — HIGH (ref 70–99)
HCT VFR BLD CALC: 30.4 % — LOW (ref 34.5–45)
HGB BLD-MCNC: 10 G/DL — LOW (ref 11.5–15.5)
MAGNESIUM SERPL-MCNC: 1.9 MG/DL — SIGNIFICANT CHANGE UP (ref 1.6–2.6)
MCHC RBC-ENTMCNC: 29.9 PG — SIGNIFICANT CHANGE UP (ref 27–34)
MCHC RBC-ENTMCNC: 32.9 G/DL — SIGNIFICANT CHANGE UP (ref 32–36)
MCV RBC AUTO: 90.7 FL — SIGNIFICANT CHANGE UP (ref 80–100)
NRBC # BLD: 0 /100 WBCS — SIGNIFICANT CHANGE UP (ref 0–0)
NRBC # FLD: 0 K/UL — SIGNIFICANT CHANGE UP (ref 0–0)
PHOSPHATE SERPL-MCNC: 3.1 MG/DL — SIGNIFICANT CHANGE UP (ref 2.5–4.5)
PLATELET # BLD AUTO: 353 K/UL — SIGNIFICANT CHANGE UP (ref 150–400)
POTASSIUM SERPL-MCNC: 3.9 MMOL/L — SIGNIFICANT CHANGE UP (ref 3.5–5.3)
POTASSIUM SERPL-SCNC: 3.9 MMOL/L — SIGNIFICANT CHANGE UP (ref 3.5–5.3)
RBC # BLD: 3.35 M/UL — LOW (ref 3.8–5.2)
RBC # FLD: 12.5 % — SIGNIFICANT CHANGE UP (ref 10.3–14.5)
SODIUM SERPL-SCNC: 138 MMOL/L — SIGNIFICANT CHANGE UP (ref 135–145)
WBC # BLD: 6.28 K/UL — SIGNIFICANT CHANGE UP (ref 3.8–10.5)
WBC # FLD AUTO: 6.28 K/UL — SIGNIFICANT CHANGE UP (ref 3.8–10.5)

## 2024-12-06 PROCEDURE — 99232 SBSQ HOSP IP/OBS MODERATE 35: CPT

## 2024-12-06 PROCEDURE — 99239 HOSP IP/OBS DSCHRG MGMT >30: CPT

## 2024-12-06 RX ORDER — INSULIN GLARGINE 100 [IU]/ML
36 INJECTION, SOLUTION SUBCUTANEOUS
Qty: 10.8 | Refills: 0
Start: 2024-12-06 | End: 2025-01-04

## 2024-12-06 RX ORDER — ACETAMINOPHEN 500MG 500 MG/1
2 TABLET, COATED ORAL
Qty: 0 | Refills: 0 | DISCHARGE
Start: 2024-12-06

## 2024-12-06 RX ORDER — AMOXICILLIN/POTASSIUM CLAV 250-125 MG
1 TABLET ORAL
Qty: 14 | Refills: 0
Start: 2024-12-06 | End: 2024-12-12

## 2024-12-06 RX ORDER — ACETAMINOPHEN 500MG 500 MG/1
1000 TABLET, COATED ORAL ONCE
Refills: 0 | Status: COMPLETED | OUTPATIENT
Start: 2024-12-06 | End: 2024-12-06

## 2024-12-06 RX ORDER — INSULIN GLARGINE 100 [IU]/ML
28 INJECTION, SOLUTION SUBCUTANEOUS
Refills: 0 | DISCHARGE

## 2024-12-06 RX ORDER — INSULIN ASPART 100 [IU]/ML
28 INJECTION, SOLUTION INTRAVENOUS; SUBCUTANEOUS
Qty: 9 | Refills: 0
Start: 2024-12-06 | End: 2025-01-04

## 2024-12-06 RX ORDER — INSULIN ASPART 100 [IU]/ML
30 INJECTION, SOLUTION INTRAVENOUS; SUBCUTANEOUS
Refills: 0 | DISCHARGE

## 2024-12-06 RX ADMIN — Medication 4: at 12:33

## 2024-12-06 RX ADMIN — ACETAMINOPHEN 500MG 650 MILLIGRAM(S): 500 TABLET, COATED ORAL at 04:43

## 2024-12-06 RX ADMIN — Medication 32 UNIT(S): at 08:48

## 2024-12-06 RX ADMIN — ACETAMINOPHEN 500MG 650 MILLIGRAM(S): 500 TABLET, COATED ORAL at 13:43

## 2024-12-06 RX ADMIN — Medication 81 MILLIGRAM(S): at 12:36

## 2024-12-06 RX ADMIN — IPRATROPIUM BROMIDE AND ALBUTEROL SULFATE 3 MILLILITER(S): 2.5; .5 SOLUTION RESPIRATORY (INHALATION) at 15:23

## 2024-12-06 RX ADMIN — ACETAMINOPHEN 500MG 400 MILLIGRAM(S): 500 TABLET, COATED ORAL at 00:21

## 2024-12-06 RX ADMIN — ENOXAPARIN SODIUM 40 MILLIGRAM(S): 30 INJECTION SUBCUTANEOUS at 05:41

## 2024-12-06 RX ADMIN — Medication 6: at 08:49

## 2024-12-06 RX ADMIN — AMPICILLIN AND SULBACTAM 200 GRAM(S): 1; .5 INJECTION, POWDER, FOR SOLUTION INTRAVENOUS at 05:37

## 2024-12-06 RX ADMIN — AMPICILLIN AND SULBACTAM 200 GRAM(S): 1; .5 INJECTION, POWDER, FOR SOLUTION INTRAVENOUS at 12:35

## 2024-12-06 RX ADMIN — Medication 1 TABLET(S): at 08:54

## 2024-12-06 RX ADMIN — IPRATROPIUM BROMIDE AND ALBUTEROL SULFATE 3 MILLILITER(S): 2.5; .5 SOLUTION RESPIRATORY (INHALATION) at 08:29

## 2024-12-06 RX ADMIN — GABAPENTIN 600 MILLIGRAM(S): 300 CAPSULE ORAL at 14:21

## 2024-12-06 RX ADMIN — ACETAMINOPHEN 500MG 1000 MILLIGRAM(S): 500 TABLET, COATED ORAL at 00:51

## 2024-12-06 RX ADMIN — GABAPENTIN 600 MILLIGRAM(S): 300 CAPSULE ORAL at 05:42

## 2024-12-06 RX ADMIN — IPRATROPIUM BROMIDE AND ALBUTEROL SULFATE 3 MILLILITER(S): 2.5; .5 SOLUTION RESPIRATORY (INHALATION) at 03:30

## 2024-12-06 RX ADMIN — ACETAMINOPHEN 500MG 650 MILLIGRAM(S): 500 TABLET, COATED ORAL at 12:43

## 2024-12-06 RX ADMIN — ACETAMINOPHEN 500MG 650 MILLIGRAM(S): 500 TABLET, COATED ORAL at 04:13

## 2024-12-06 RX ADMIN — Medication 32 UNIT(S): at 12:34

## 2024-12-06 NOTE — DISCHARGE NOTE NURSING/CASE MANAGEMENT/SOCIAL WORK - NSDCFUADDAPPT_GEN_ALL_CORE_FT
Follow up with PCP and endocrinology. Please follow up at [facility] [place] [address] [place] [address]; [phone]. The office was notified you will need follow up. If you do not receive a call to make an appt, please call the office to confirm and arrange appt. You will also need to follow up with an ophthalmologist. If needed, you may follow up with [facility].     Podiatry Discharge Instructions:  Follow up: Please follow up with Dr. Sanders within 1 week of discharge from the hospital, please call [phone] for appointment and discuss that you recently were seen in the hospital.  Wound Care: Please apply iodoform packing to right foot wound followed by 4x4 gauze, ABD pad, and divine daily, thank you!   Weight bearing: Please weight bear as tolerated in a surgical shoe to right heel.  Antibiotics: Please continue as instructed.

## 2024-12-06 NOTE — PROGRESS NOTE ADULT - NUTRITIONAL ASSESSMENT
This patient has been assessed with a concern for Malnutrition and has been determined to have a diagnosis/diagnoses of Morbid obesity (BMI > 40).    This patient is being managed with:   Diet Regular-  Consistent Carbohydrate {No Snacks} (CSTCHO)  DASH/TLC {Sodium & Cholesterol Restricted} (DASH)  Entered: Dec  1 2024 11:40PM  

## 2024-12-06 NOTE — PROGRESS NOTE ADULT - PROVIDER SPECIALTY LIST ADULT
Hospitalist
Infectious Disease
Podiatry
Podiatry
Infectious Disease
Podiatry
Infectious Disease
Podiatry
Endocrinology
Hospitalist

## 2024-12-06 NOTE — PROGRESS NOTE ADULT - TIME BILLING
Time-based billing (NON-critical care).     36 minutes spent on total encounter; more than 50% of the visit was spent counseling and / or coordinating care by the attending physician.  The necessity of the time spent during the encounter on this date of service was due to:     review of laboratory data, radiology results, consultants' recommendations, documentation in [place], discussion with patient and interdisciplinary staff (such as , social workers, etc). Interventions were performed as documented above.
Time-based billing (NON-critical care).     36 minutes spent on total encounter; more than 50% of the visit was spent counseling and / or coordinating care by the attending physician.  The necessity of the time spent during the encounter on this date of service was due to:     review of laboratory data, radiology results, consultants' recommendations, documentation in [place], discussion with patient and interdisciplinary staff (such as , social workers, etc). Interventions were performed as documented above.
Time-based billing (NON-critical care).     55 minutes spent on total encounter; more than 50% of the visit was spent counseling and / or coordinating care by the attending physician.  The necessity of the time spent during the encounter on this date of service was due to:     review of laboratory data, radiology results, consultants' recommendations, documentation in [place], discussion with patient and interdisciplinary staff (such as , social workers, etc). Interventions were performed as documented above.
Time-based billing (NON-critical care).     36 minutes spent on total encounter; more than 50% of the visit was spent counseling and / or coordinating care by the attending physician.  The necessity of the time spent during the encounter on this date of service was due to:     review of laboratory data, radiology results, consultants' recommendations, documentation in [place], discussion with patient and interdisciplinary staff (such as , social workers, etc). Interventions were performed as documented above.
Time-based billing (NON-critical care).     53 minutes spent on total encounter; more than 50% of the visit was spent counseling and / or coordinating care by the attending physician.  The necessity of the time spent during the encounter on this date of service was due to:     review of laboratory data, radiology results, consultants' recommendations, documentation in [place], discussion with patient, podiatry, rheumatology, and interdisciplinary staff (such as , social workers, etc). Interventions were performed as documented above.

## 2024-12-06 NOTE — PROGRESS NOTE ADULT - PROBLEM SELECTOR PLAN 3
Pt's right foot w/ circumferential area of erythema, warmth, and fluctuance.   - c/w IV vanc, zosyn  Check ESR, CRP  Podiatry consulted for possible I+D, recommend pursuing MRI foot w/ IV cont rather than CT - ordered
Pt's right foot w/ circumferential area of erythema, warmth, and fluctuance.   Podiatry consulted: s/p bedside I+D  Podiatry state tophaceous material and purulence expressed from wound - f/u Cx data. Given 1 dose colchicine 1.2mg. Rheumatology consulted - not gout, treat infection.    Wound Cx with Group B strep.  ID following. Zosyn switched to Unasyn.    MRI foot w/o cont obtained (refused IV cont) - cellulitis noted, no evidence of OM.
Pt's right foot w/ circumferential area of erythema, warmth, and fluctuance.   Podiatry consulted: s/p bedside I+D, recommend pursuing MRI foot w/ IV cont rather than CT - ordered.  On zosyn.  Podiatry state tophaceous material and purulence expressed from wound - f/u Cx data. Given 1 dose colchicine 1.2mg. Rheumatology consulted.
Pt's right foot w/ circumferential area of erythema, warmth, and fluctuance.   Podiatry consulted: s/p bedside I+D  Podiatry state tophaceous material and purulence expressed from wound - f/u Cx data. Given 1 dose colchicine 1.2mg. Rheumatology consulted - not gout, treat infection.    Wound Cx with Group B strep.  On zosyn. ID following.    MRI foot w/o cont obtained (refused IV cont) - cellulitis noted, no evidence of OM.
Pt's right foot w/ circumferential area of erythema, warmth, and fluctuance.   Podiatry consulted: s/p bedside I+D  Podiatry state tophaceous material and purulence expressed from wound - f/u Cx data. Given 1 dose colchicine 1.2mg. Rheumatology consulted - not gout, treat infection.    Wound Cx with Group B strep.  ID following. Zosyn switched to Unasyn.    MRI foot w/o cont obtained (refused IV cont) - cellulitis noted, no evidence of OM.

## 2024-12-06 NOTE — PROGRESS NOTE ADULT - SKIN COMMENTS
right foot dorsal cellulitis, wrapped with dressing c/d/i
right dorsal foot wound s/p incision w/ surrounding erythema
right dorsal foot erythema and warmth with underlying fluctuance, with erythema extending proximally to mid shin
right dorsal foot cellulitis, s/p incision for drainage
right foot dorsal cellulitis wrapped with dressing c/d/i

## 2024-12-06 NOTE — PROGRESS NOTE ADULT - SUBJECTIVE AND OBJECTIVE BOX
64yPatient is a 64y old  Female who presents with a chief complaint of hyperglycemia, cough, leg pain (04 Dec 2024 13:22)      Interval history:  Afebrile, still with pain in foot.       Allergies:   No Known Allergies      Antimicrobials:  piperacillin/tazobactam IVPB.. 3.375 Gram(s) IV Intermittent every 8 hours        REVIEW OF SYSTEMS:  No chest pain   No SOB  No abdominal pain  No rash.         Vital Signs Last 24 Hrs  T(C): 36.9 (12-04-24 @ 17:52), Max: 37.1 (12-04-24 @ 11:20)  T(F): 98.4 (12-04-24 @ 17:52), Max: 98.8 (12-04-24 @ 11:20)  HR: 78 (12-04-24 @ 21:52) (73 - 85)  BP: 124/55 (12-04-24 @ 17:52) (113/65 - 142/63)  BP(mean): --  RR: 17 (12-04-24 @ 17:52) (17 - 19)  SpO2: 95% (12-04-24 @ 21:52) (94% - 100%)        PHYSICAL EXAM:  Pt in no acute distress, alert, awake.   breathing comfortably   non distended abdomen  rt LE edema with improved erythema, tenderness, warmth.  no phlebitis                             10.8   11.55 )-----------( 318      ( 03 Dec 2024 07:00 )             34.2   12-03    133[L]  |  99  |  10  ----------------------------<  308[H]  4.4   |  25  |  0.36[L]    Ca    9.0      03 Dec 2024 07:00  Phos  2.7     12-03  Mg     1.90     12-03        Culture - Abscess with Gram Stain (collected 02 Dec 2024 15:52)  Source: .Abscess  Gram Stain (03 Dec 2024 17:29):    Rare polymorphonuclear leukocytes seen per low power field    No organisms seen per oil power field  Preliminary Report (04 Dec 2024 16:10):    Rare Streptococcus agalactiae (Group B)    Group B streptococci are susceptible to ampicillin,    penicillin and cefazolin, but may be resistant to    erythromycin and clindamycin.        Radiology:    < from: MR Foot No Cont, Right (12.04.24 @ 10:29) >  IMPRESSION: Dorsal cutaneous wound with adjacentcellulitis. No   osteomyelitis.          
Chief Complaint: Type 2 DM     History: Pt seen at bedside. Pt tolerating oral diet. Pt denies nausea and vomiting/any signs of hypoglycemia. Pt reports an adequate appetite.     MEDICATIONS  (STANDING):  albuterol/ipratropium for Nebulization 3 milliLiter(s) Nebulizer every 6 hours  ampicillin/sulbactam  IVPB 3 Gram(s) IV Intermittent every 6 hours  ampicillin/sulbactam  IVPB      aspirin  chewable 81 milliGRAM(s) Oral daily  dextrose 5%. 1000 milliLiter(s) (50 mL/Hr) IV Continuous <Continuous>  dextrose 5%. 1000 milliLiter(s) (100 mL/Hr) IV Continuous <Continuous>  dextrose 50% Injectable 25 Gram(s) IV Push once  dextrose 50% Injectable 12.5 Gram(s) IV Push once  dextrose 50% Injectable 25 Gram(s) IV Push once  dextrose Oral Gel 15 Gram(s) Oral once  enoxaparin Injectable 40 milliGRAM(s) SubCutaneous every 12 hours  gabapentin 600 milliGRAM(s) Oral three times a day  glucagon  Injectable 1 milliGRAM(s) IntraMuscular once  insulin glargine Injectable (LANTUS) 36 Unit(s) SubCutaneous at bedtime  insulin lispro (ADMELOG) corrective regimen sliding scale   SubCutaneous three times a day before meals  insulin lispro (ADMELOG) corrective regimen sliding scale   SubCutaneous at bedtime  insulin lispro Injectable (ADMELOG) 32 Unit(s) SubCutaneous three times a day before meals  lactobacillus acidophilus 1 Tablet(s) Oral two times a day with meals  rosuvastatin 20 milliGRAM(s) Oral at bedtime  senna 2 Tablet(s) Oral at bedtime    MEDICATIONS  (PRN):  acetaminophen     Tablet .. 650 milliGRAM(s) Oral every 6 hours PRN Temp greater or equal to 38C (100.4F), Mild Pain (1 - 3)  benzonatate 100 milliGRAM(s) Oral every 8 hours PRN Cough  melatonin 3 milliGRAM(s) Oral at bedtime PRN Insomnia  oxyCODONE    IR 2.5 milliGRAM(s) Oral every 6 hours PRN Moderate Pain (4 - 6)  oxyCODONE    IR 5 milliGRAM(s) Oral every 6 hours PRN Severe Pain (7 - 10)  polyethylene glycol 3350 17 Gram(s) Oral daily PRN Constipation      Allergies: No Known Allergies      Review of Systems:  Respiratory: No SOB, no cough  GI: No nausea, vomiting, abdominal pain  Endocrine: no polyuria, polydipsia      PHYSICAL EXAM:  VITALS: T(C): 36.9 (12-05-24 @ 10:05)  T(F): 98.4 (12-05-24 @ 10:05), Max: 98.5 (12-05-24 @ 08:30)  HR: 85 (12-05-24 @ 10:05) (69 - 85)  BP: 123/63 (12-05-24 @ 10:05) (123/63 - 137/66)  RR:  (17 - 18)  SpO2:  (93% - 97%)  Wt(kg): --  GENERAL: NAD, well-groomed, well-developed  RESPIRATORY: No labored breathing   GI: Soft, nontender, non distended  PSYCH: Alert and oriented x 3, normal affect, normal mood      CAPILLARY BLOOD GLUCOSE  POCT Blood Glucose.: 288 mg/dL (05 Dec 2024 12:06)  POCT Blood Glucose.: 263 mg/dL (05 Dec 2024 08:22)  POCT Blood Glucose.: 230 mg/dL (04 Dec 2024 20:46)  POCT Blood Glucose.: 262 mg/dL (04 Dec 2024 17:18)    A1C with Estimated Average Glucose (12.01.24 @ 13:11)    A1C with Estimated Average Glucose Result: 13.4   Estimated Average Glucose: 338      12-05    139  |  103  |  9   ----------------------------<  225[H]  3.9   |  26  |  0.37[L]    eGFR: 113    Ca    9.1      12-05  Mg     2.00     12-05  Phos  2.9     12-05    Diet, Regular:   Consistent Carbohydrate No Snacks (CSTCHO)  DASH/TLC Sodium & Cholesterol Restricted (DASH) (12-01-24 @ 23:40) [Active]                          
Patient is a 64y old  Female who presents with a chief complaint of hyperglycemia, cough, leg pain (03 Dec 2024 10:00)       INTERVAL HPI/OVERNIGHT EVENTS:  Patient seen and evaluated at bedside.  Pt is resting comfortable in NAD. Denies N/V/F/C.    Allergies    No Known Allergies    Intolerances        Vital Signs Last 24 Hrs  T(C): 36.7 (03 Dec 2024 11:21), Max: 38.2 (02 Dec 2024 22:37)  T(F): 98 (03 Dec 2024 11:21), Max: 100.8 (02 Dec 2024 22:37)  HR: 82 (03 Dec 2024 11:21) (72 - 94)  BP: 136/77 (03 Dec 2024 11:21) (115/62 - 144/66)  BP(mean): --  RR: 17 (03 Dec 2024 11:21) (16 - 18)  SpO2: 99% (03 Dec 2024 11:21) (98% - 100%)    Parameters below as of 03 Dec 2024 11:21  Patient On (Oxygen Delivery Method): room air        LABS:                        10.8   11.55 )-----------( 318      ( 03 Dec 2024 07:00 )             34.2     12-03    133[L]  |  99  |  10  ----------------------------<  308[H]  4.4   |  25  |  0.36[L]    Ca    9.0      03 Dec 2024 07:00  Phos  2.7     12-03  Mg     1.90     12-03    TPro  8.0  /  Alb  3.9  /  TBili  0.5  /  DBili  x   /  AST  12  /  ALT  8   /  AlkPhos  124[H]  12-01      Urinalysis Basic - ( 03 Dec 2024 07:00 )    Color: x / Appearance: x / SG: x / pH: x  Gluc: 308 mg/dL / Ketone: x  / Bili: x / Urobili: x   Blood: x / Protein: x / Nitrite: x   Leuk Esterase: x / RBC: x / WBC x   Sq Epi: x / Non Sq Epi: x / Bacteria: x      CAPILLARY BLOOD GLUCOSE      POCT Blood Glucose.: 301 mg/dL (03 Dec 2024 11:46)  POCT Blood Glucose.: 305 mg/dL (03 Dec 2024 08:35)  POCT Blood Glucose.: 220 mg/dL (02 Dec 2024 22:58)  POCT Blood Glucose.: 117 mg/dL (02 Dec 2024 17:56)      Lower Extremity Physical Exam:  Vascular: DP 1/4, B/L PT 0/4 B/L, CFT <3 seconds B/L, Temperature gradient warm to cool, B/L.   Neuro: Epicritic sensation diminished to the level of digits, B/L.  Musculoskeletal/Ortho: pain upon palpation of right foot   Skin: 12/2 s/p b/s right foot incision and drainage: no malodor, no pus, mild erythema improvement.     RADIOLOGY & ADDITIONAL TESTS:  
Patient is a 64y old  Female who presents with a chief complaint of hyperglycemia, cough, leg pain (05 Dec 2024 15:09)      HPI:  Pt is a 65 y/o female pmh htn, dm, asthma presents w/ complaint of cough x1 month, right foot pain/swelling x3 weeks and urinary frequency x3 weeks. Pt states symptoms of cough started about 1 month ago, she went to urgent care and was prescribed course of steroids and doxycycline which she completed. She states her symptoms of cough never went away nor did she feel better. She states she has redness, swelling of her right foot, she denies any drainage but feels like there is fluid there and feels like it's going to burst. She denies any trauma or injury to her foot but states she suffers for neuropathy. She also has been complaining of increased urinary frequency, stating that since she is coughing so hard she will sometimes urinate on herself. She denies any fevers, chest pain, abd pain, diarrhea, or vomiting. She states she is compliant with her insulin.     In ED, vitals T 97.5, HR 97, /77, RR 24, O2 sat 95% on RA  Labs significant for: wbc 16.81, glucose 630, a1c 13.4, U/A positive for infection  EKG personally reviewed: NSR, 1st degree AV block, no acute ischemic changes  CXR: prelim radiology read: Right basilar hazy opacity  ED management: endo consulted. lispro 30 units x1, IV morphine 4 mg x1, lantus 26 units x1, 1L NS, endo consulted (01 Dec 2024 21:06)      PAST MEDICAL & SURGICAL HISTORY:  Type 2 diabetes mellitus without complication, without long-term current use of insulin      HTN (hypertension)      Asthma      Asthma with bronchitis          MEDICATIONS  (STANDING):  albuterol/ipratropium for Nebulization 3 milliLiter(s) Nebulizer every 6 hours  ampicillin/sulbactam  IVPB 3 Gram(s) IV Intermittent every 6 hours  ampicillin/sulbactam  IVPB      aspirin  chewable 81 milliGRAM(s) Oral daily  dextrose 5%. 1000 milliLiter(s) (50 mL/Hr) IV Continuous <Continuous>  dextrose 5%. 1000 milliLiter(s) (100 mL/Hr) IV Continuous <Continuous>  dextrose 50% Injectable 25 Gram(s) IV Push once  dextrose 50% Injectable 12.5 Gram(s) IV Push once  dextrose 50% Injectable 25 Gram(s) IV Push once  dextrose Oral Gel 15 Gram(s) Oral once  enoxaparin Injectable 40 milliGRAM(s) SubCutaneous every 12 hours  gabapentin 600 milliGRAM(s) Oral three times a day  glucagon  Injectable 1 milliGRAM(s) IntraMuscular once  insulin glargine Injectable (LANTUS) 36 Unit(s) SubCutaneous at bedtime  insulin lispro (ADMELOG) corrective regimen sliding scale   SubCutaneous three times a day before meals  insulin lispro (ADMELOG) corrective regimen sliding scale   SubCutaneous at bedtime  insulin lispro Injectable (ADMELOG) 32 Unit(s) SubCutaneous three times a day before meals  lactobacillus acidophilus 1 Tablet(s) Oral two times a day with meals  rosuvastatin 20 milliGRAM(s) Oral at bedtime  senna 2 Tablet(s) Oral at bedtime    MEDICATIONS  (PRN):  acetaminophen     Tablet .. 650 milliGRAM(s) Oral every 6 hours PRN Temp greater or equal to 38C (100.4F), Mild Pain (1 - 3)  benzonatate 100 milliGRAM(s) Oral every 8 hours PRN Cough  melatonin 3 milliGRAM(s) Oral at bedtime PRN Insomnia  polyethylene glycol 3350 17 Gram(s) Oral daily PRN Constipation      Allergies    No Known Allergies    Intolerances        VITALS:    Vital Signs Last 24 Hrs  T(C): 36.6 (06 Dec 2024 04:50), Max: 36.9 (05 Dec 2024 10:05)  T(F): 97.9 (06 Dec 2024 04:50), Max: 98.5 (05 Dec 2024 17:54)  HR: 71 (06 Dec 2024 04:50) (71 - 97)  BP: 138/63 (06 Dec 2024 04:50) (120/73 - 146/77)  BP(mean): --  RR: 18 (06 Dec 2024 04:50) (18 - 18)  SpO2: 98% (06 Dec 2024 04:50) (96% - 100%)    Parameters below as of 06 Dec 2024 04:50  Patient On (Oxygen Delivery Method): room air        LABS:                          10.0   6.28  )-----------( 353      ( 06 Dec 2024 07:33 )             30.4       12-05    139  |  103  |  9   ----------------------------<  225[H]  3.9   |  26  |  0.37[L]    Ca    9.1      05 Dec 2024 07:35  Phos  2.9     12-05  Mg     2.00     12-05        CAPILLARY BLOOD GLUCOSE      POCT Blood Glucose.: 264 mg/dL (06 Dec 2024 08:19)  POCT Blood Glucose.: 115 mg/dL (05 Dec 2024 22:18)  POCT Blood Glucose.: 136 mg/dL (05 Dec 2024 17:31)  POCT Blood Glucose.: 288 mg/dL (05 Dec 2024 12:06)          LOWER EXTREMITY PHYSICAL EXAM:    Vascular: DP 1/4, B/L PT 0/4 B/L, CFT <3 seconds B/L, Temperature gradient warm to cool, B/L.   Neuro: Epicritic sensation diminished to the level of digits, B/L.  Musculoskeletal/Ortho: pain upon palpation of right foot   Skin: 12/2 s/p b/s right foot incision and drainage: no malodor, no pus, mild erythema improvement.     RADIOLOGY & ADDITIONAL STUDIES:    < from: Xray Foot AP + Lateral + Oblique, Right (12.05.24 @ 12:15) >    ACC: [id number] EXAM:  XR FOOT COMP MIN 3 VIEWS RT   ORDERED BY: YAYA ALBERTO     PROCEDURE DATE:  12/05/2024          INTERPRETATION:  CLINICAL INDICATION: right foot swelling; infection    EXAM:  Portable frontal and lateral right foot from 12/5/2024at 12:15. Reviewed   in conjunction with previous day right foot MRI.    IMPRESSION:  Diffuse soft tissue swelling could correlate with cellulitis. No tracking   soft tissue gas collections, radiopaque foreign bodies, or gross   radiographic evidencefor osteomyelitis.    No fractures or dislocations.    Tarsometatarsal alignment maintained without evidence for a Lisfranc   injury. Congenitally fused 5th DIP joint. Hypertrophic degenerative   spurring along dorsal hindfoot and midfoot articular margins. Preserved   remaining joint spaces and no joint margin erosions.    Thick plantar and small posterior calcaneal enthesophytes.    Generalized osteopenia otherwise no discrete lytic or blastic lesions.    --- End of Report ---            TIARA STALLWORTH; Attending Radiologist  This document has been electronically signed. Dec  5 2024  4:59PM    < end of copied text >    < from: MR Foot No Cont, Right (12.04.24 @ 10:29) >    ACC: [id number] EXAM:  MR FOOT RT   ORDERED BY: GEORGIA PATTERSON     PROCEDURE DATE:  12/04/2024          INTERPRETATION:  EXAMINATION: MRI of the right foot without contrast    CLINICAL INFORMATION: Right foot cellulitis    TECHNIQUE: Multiplanar, multisequential MR imaging was performed.    FINDINGS: There is a dorsal cutaneous wound at the level of the second   metatarsal with adjacent skin thickening and subcutaneous fat   infiltration and edema signal, consistent with cellulitis. The wound is   suspected to communicate with a subcentimeter collection in the dorsal   subcutaneous tissues at this level. There are no areas of marrow signal   alteration that raise suspicion for osteomyelitis. There are mild   osteoarthritic changes at the midfoot and tarsometatarsal articulations,   most pronounced at the second tarsometatarsal articulation. There is   diffuse fatty atrophy and high T2 signal of musculature, consistent with   denervation.    IMPRESSION: Dorsal cutaneous wound with adjacentcellulitis. No   osteomyelitis.    --- End of Report ---            RISHI SIDDIQI MD; Attending Radiologist  This document has been electronically signed. Dec  4 2024 10:56AM    < end of copied text >  
    Chief Complaint: type  2 DM    History: Tolerating PO diet. Pt FS yesterday at dinner time and bedtime was at goal. This morning and afternoon FS above goal. Pt had cookies and milk late last night.     MEDICATIONS  (STANDING):  albuterol/ipratropium for Nebulization 3 milliLiter(s) Nebulizer every 6 hours  ampicillin/sulbactam  IVPB 3 Gram(s) IV Intermittent every 6 hours  ampicillin/sulbactam  IVPB      aspirin  chewable 81 milliGRAM(s) Oral daily  dextrose 5%. 1000 milliLiter(s) (50 mL/Hr) IV Continuous <Continuous>  dextrose 5%. 1000 milliLiter(s) (100 mL/Hr) IV Continuous <Continuous>  dextrose 50% Injectable 25 Gram(s) IV Push once  dextrose 50% Injectable 12.5 Gram(s) IV Push once  dextrose 50% Injectable 25 Gram(s) IV Push once  dextrose Oral Gel 15 Gram(s) Oral once  enoxaparin Injectable 40 milliGRAM(s) SubCutaneous every 12 hours  gabapentin 600 milliGRAM(s) Oral three times a day  glucagon  Injectable 1 milliGRAM(s) IntraMuscular once  insulin glargine Injectable (LANTUS) 36 Unit(s) SubCutaneous at bedtime  insulin lispro (ADMELOG) corrective regimen sliding scale   SubCutaneous three times a day before meals  insulin lispro (ADMELOG) corrective regimen sliding scale   SubCutaneous at bedtime  insulin lispro Injectable (ADMELOG) 32 Unit(s) SubCutaneous three times a day before meals  lactobacillus acidophilus 1 Tablet(s) Oral two times a day with meals  rosuvastatin 20 milliGRAM(s) Oral at bedtime  senna 2 Tablet(s) Oral at bedtime    MEDICATIONS  (PRN):  acetaminophen     Tablet .. 650 milliGRAM(s) Oral every 6 hours PRN Temp greater or equal to 38C (100.4F), Mild Pain (1 - 3)  benzonatate 100 milliGRAM(s) Oral every 8 hours PRN Cough  melatonin 3 milliGRAM(s) Oral at bedtime PRN Insomnia  polyethylene glycol 3350 17 Gram(s) Oral daily PRN Constipation      Allergies    No Known Allergies    Intolerances      Review of Systems:  HEENT: No pain  Cardiovascular: No chest pain, palpitations  Respiratory: No SOB, no cough  GI: No nausea, vomiting, abdominal pain  : No dysuria  Endocrine: no polyuria, polydipsia      PHYSICAL EXAM:  VITALS: T(C): 36.8 (12-06-24 @ 10:05)  T(F): 98.3 (12-06-24 @ 10:05), Max: 98.5 (12-05-24 @ 17:54)  HR: 88 (12-06-24 @ 10:05) (71 - 97)  BP: 134/71 (12-06-24 @ 10:05) (120/73 - 146/77)  RR:  (18 - 18)  SpO2:  (97% - 100%)  Wt(kg): --  GENERAL: NAD, well-groomed, well-developed  EYES: No proptosis  HEENT:  Atraumatic, Normocephalic  RESPIRATORY: non labored breathing   CARDIOVASCULAR: Regular rate and rhythm  GI: Soft, nontender, non distended  PSYCH: Alert and oriented x 3, normal affect, normal mood       CAPILLARY BLOOD GLUCOSE      POCT Blood Glucose.: 224 mg/dL (06 Dec 2024 12:01)  POCT Blood Glucose.: 264 mg/dL (06 Dec 2024 08:19)  POCT Blood Glucose.: 115 mg/dL (05 Dec 2024 22:18)  POCT Blood Glucose.: 136 mg/dL (05 Dec 2024 17:31)      12-06    138  |  104  |  9   ----------------------------<  239[H]  3.9   |  23  |  0.31[L]    eGFR: 117    Ca    9.1      12-06  Mg     1.90     12-06  Phos  3.1     12-06            Thyroid Function Tests:        A1C with Estimated Average Glucose Result: 13.4 % (12-01-24 @ 13:11)          Diet, Regular:   Consistent Carbohydrate No Snacks (CSTCHO)  DASH/TLC Sodium & Cholesterol Restricted (DASH) (12-01-24 @ 23:40)        
64yPatient is a 64y old  Female who presents with a chief complaint of hyperglycemia, cough, leg pain (03 Dec 2024 14:27)      Interval history:  febrile, still with pain in foot.     Allergies:   No Known Allergies    Antimicrobials:    piperacillin/tazobactam IVPB.. 3.375 Gram(s) IV Intermittent every 8 hours    REVIEW OF SYSTEMS:  No chest pain   No  SOB  No abdominal pain  No dysuria       Vital Signs Last 24 Hrs  T(C): 37 (12-03-24 @ 13:56), Max: 38.2 (12-02-24 @ 22:37)  T(F): 98.6 (12-03-24 @ 13:56), Max: 100.8 (12-02-24 @ 22:37)  HR: 84 (12-03-24 @ 16:27) (72 - 98)  BP: 156/75 (12-03-24 @ 13:56) (130/69 - 156/75)  BP(mean): --  RR: 18 (12-03-24 @ 13:56) (16 - 18)  SpO2: 99% (12-03-24 @ 16:27) (99% - 100%)      PHYSICAL EXAM:  Pt in no acute distress, alert, awake.   breathing comfortably   non distended abdomen  rt LE edema with erythema, tenderness, warmth.  no phlebitis                             10.8   11.55 )-----------( 318      ( 03 Dec 2024 07:00 )             34.2   12-03    133[L]  |  99  |  10  ----------------------------<  308[H]  4.4   |  25  |  0.36[L]    Ca    9.0      03 Dec 2024 07:00  Phos  2.7     12-03  Mg     1.90     12-03        Culture - Abscess with Gram Stain (collected 02 Dec 2024 15:52)  Source: .Abscess  Gram Stain (03 Dec 2024 17:29):    Rare polymorphonuclear leukocytes seen per low power field    No organisms seen per oil power field  Preliminary Report (03 Dec 2024 17:29):    Rare Streptococcus agalactiae (Group B)    Culture - Blood (collected 01 Dec 2024 21:45)  Source: .Blood BLOOD  Preliminary Report (03 Dec 2024 05:00):    No growth at 24 hours    Culture - Blood (collected 01 Dec 2024 21:30)  Source: .Blood BLOOD  Preliminary Report (03 Dec 2024 05:00):    No growth at 24 hours              
64yPatient is a 64y old  Female who presents with a chief complaint of hyperglycemia, cough, leg pain (06 Dec 2024 13:09)      Interval history:  Afebrile, still with some cough, leg much better.     Allergies:   No Known Allergies    Antimicrobials:  ampicillin/sulbactam  IVPB 3 Gram(s) IV Intermittent every 6 hours  ampicillin/sulbactam  IVPB          REVIEW OF SYSTEMS:  No chest pain   No abdominal pain  No rash.       Vital Signs Last 24 Hrs  T(C): 36.8 (12-06-24 @ 10:05), Max: 36.9 (12-05-24 @ 22:50)  T(F): 98.3 (12-06-24 @ 10:05), Max: 98.5 (12-05-24 @ 22:50)  HR: 96 (12-06-24 @ 15:26) (71 - 97)  BP: 134/71 (12-06-24 @ 10:05) (134/71 - 146/77)  BP(mean): --  RR: 18 (12-06-24 @ 10:05) (18 - 18)  SpO2: 96% (12-06-24 @ 15:26) (96% - 100%)      PHYSICAL EXAM:  Pt in no acute distress, alert, awake.   breathing comfortably   non distended abdomen  rt LE edema with resolved erythema, warmth. lt foot with dressing.   no phlebitis                             10.0   6.28  )-----------( 353      ( 06 Dec 2024 07:33 )             30.4   12-06    138  |  104  |  9   ----------------------------<  239[H]  3.9   |  23  |  0.31[L]    Ca    9.1      06 Dec 2024 07:33  Phos  3.1     12-06  Mg     1.90     12-06          < from: Xray Foot AP + Lateral + Oblique, Right (12.05.24 @ 12:15) >  IMPRESSION:  Diffuse soft tissue swelling could correlate with cellulitis. No tracking   soft tissue gas collections, radiopaque foreign bodies, or gross   radiographic evidencefor osteomyelitis.    No fractures or dislocations.    Tarsometatarsal alignment maintained without evidence for a Lisfranc   injury. Congenitally fused 5th DIP joint. Hypertrophic degenerative   spurring along dorsal hindfoot and midfoot articular margins. Preserved   remaining joint spaces and no joint margin erosions.    Thick plantar and small posterior calcaneal enthesophytes.    Generalized osteopenia otherwise no discrete lytic or blastic lesions.            Radiology:      
Patient is a 64y old  Female who presents with a chief complaint of hyperglycemia, cough, leg pain (04 Dec 2024 12:11)       INTERVAL HPI/OVERNIGHT EVENTS:  Patient seen and evaluated at bedside.  Pt is resting comfortable in NAD. Denies N/V/F/C.      Allergies    No Known Allergies    Intolerances        Vital Signs Last 24 Hrs  T(C): 37.1 (04 Dec 2024 11:20), Max: 37.3 (03 Dec 2024 18:38)  T(F): 98.8 (04 Dec 2024 11:20), Max: 99.2 (03 Dec 2024 18:38)  HR: 79 (04 Dec 2024 11:20) (73 - 98)  BP: 142/63 (04 Dec 2024 11:20) (113/61 - 156/75)  BP(mean): --  RR: 19 (04 Dec 2024 11:20) (17 - 19)  SpO2: 96% (04 Dec 2024 11:20) (95% - 100%)    Parameters below as of 04 Dec 2024 11:20  Patient On (Oxygen Delivery Method): room air        LABS:                        10.8   11.55 )-----------( 318      ( 03 Dec 2024 07:00 )             34.2     12-03    133[L]  |  99  |  10  ----------------------------<  308[H]  4.4   |  25  |  0.36[L]    Ca    9.0      03 Dec 2024 07:00  Phos  2.7     12-03  Mg     1.90     12-03        Urinalysis Basic - ( 03 Dec 2024 07:00 )    Color: x / Appearance: x / SG: x / pH: x  Gluc: 308 mg/dL / Ketone: x  / Bili: x / Urobili: x   Blood: x / Protein: x / Nitrite: x   Leuk Esterase: x / RBC: x / WBC x   Sq Epi: x / Non Sq Epi: x / Bacteria: x      CAPILLARY BLOOD GLUCOSE      POCT Blood Glucose.: 287 mg/dL (04 Dec 2024 11:32)  POCT Blood Glucose.: 164 mg/dL (03 Dec 2024 20:45)  POCT Blood Glucose.: 143 mg/dL (03 Dec 2024 17:06)      Lower Extremity Physical Exam:  Vascular: DP 1/4, B/L PT 0/4 B/L, CFT <3 seconds B/L, Temperature gradient warm to cool, B/L.   Neuro: Epicritic sensation diminished to the level of digits, B/L.  Musculoskeletal/Ortho: pain upon palpation of right foot   Skin: 12/2 s/p b/s right foot incision and drainage: no malodor, no pus, mild erythema improvement.       RADIOLOGY & ADDITIONAL TESTS:    ACC: [id number] EXAM:  MR FOOT RT   ORDERED BY: GEORGIA PATTERSON     PROCEDURE DATE:  12/04/2024          INTERPRETATION:  EXAMINATION: MRI of the right foot without contrast    CLINICAL INFORMATION: Right foot cellulitis    TECHNIQUE: Multiplanar, multisequential MR imaging was performed.    FINDINGS: There is a dorsal cutaneous wound at the level of the second   metatarsal with adjacent skin thickening and subcutaneous fat   infiltration and edema signal, consistent with cellulitis. The wound is   suspected to communicate with a subcentimeter collection in the dorsal   subcutaneous tissues at this level. There are no areas of marrow signal   alteration that raise suspicion for osteomyelitis. There are mild   osteoarthritic changes at the midfoot and tarsometatarsal articulations,   most pronounced at the second tarsometatarsal articulation. There is   diffuse fatty atrophy and high T2 signal of musculature, consistent with   denervation.    IMPRESSION: Dorsal cutaneous wound with adjacentcellulitis. No   osteomyelitis.    --- End of Report ---            RISHI SIDDIQI MD; Attending Radiologist  This document has been electronically signed. Dec  4 2024 10:56AM  
Patient is a 64y old  Female who presents with a chief complaint of hyperglycemia, cough, leg pain (05 Dec 2024 12:26)       INTERVAL HPI/OVERNIGHT EVENTS:  Patient seen and evaluated at bedside.  Pt is resting comfortable in NAD. Denies N/V/F/C.      Allergies    No Known Allergies    Intolerances        Vital Signs Last 24 Hrs  T(C): 36.9 (05 Dec 2024 10:05), Max: 37.1 (04 Dec 2024 14:51)  T(F): 98.4 (05 Dec 2024 10:05), Max: 98.7 (04 Dec 2024 14:51)  HR: 85 (05 Dec 2024 10:05) (69 - 85)  BP: 123/63 (05 Dec 2024 10:05) (123/63 - 137/66)  BP(mean): --  RR: 18 (05 Dec 2024 10:05) (17 - 18)  SpO2: 96% (05 Dec 2024 10:05) (93% - 99%)    Parameters below as of 05 Dec 2024 10:05  Patient On (Oxygen Delivery Method): room air        LABS:                        10.1   6.99  )-----------( 319      ( 05 Dec 2024 07:35 )             31.0     12-05    139  |  103  |  9   ----------------------------<  225[H]  3.9   |  26  |  0.37[L]    Ca    9.1      05 Dec 2024 07:35  Phos  2.9     12-05  Mg     2.00     12-05        Urinalysis Basic - ( 05 Dec 2024 07:35 )    Color: x / Appearance: x / SG: x / pH: x  Gluc: 225 mg/dL / Ketone: x  / Bili: x / Urobili: x   Blood: x / Protein: x / Nitrite: x   Leuk Esterase: x / RBC: x / WBC x   Sq Epi: x / Non Sq Epi: x / Bacteria: x      CAPILLARY BLOOD GLUCOSE      POCT Blood Glucose.: 288 mg/dL (05 Dec 2024 12:06)  POCT Blood Glucose.: 263 mg/dL (05 Dec 2024 08:22)  POCT Blood Glucose.: 230 mg/dL (04 Dec 2024 20:46)  POCT Blood Glucose.: 262 mg/dL (04 Dec 2024 17:18)      Lower Extremity Physical Exam:  Vascular: DP 1/4, B/L PT 0/4 B/L, CFT <3 seconds B/L, Temperature gradient warm to cool, B/L.   Neuro: Epicritic sensation diminished to the level of digits, B/L.  Musculoskeletal/Ortho: pain upon palpation of right foot   Skin: 12/2 s/p b/s right foot incision and drainage: no malodor, no pus, mild erythema improvement.       RADIOLOGY & ADDITIONAL TESTS:      ACC: [id number] EXAM:  MR FOOT RT   ORDERED BY: GEORGIA PATTERSON     PROCEDURE DATE:  12/04/2024          INTERPRETATION:  EXAMINATION: MRI of the right foot without contrast    CLINICAL INFORMATION: Right foot cellulitis    TECHNIQUE: Multiplanar, multisequential MR imaging was performed.    FINDINGS: There is a dorsal cutaneous wound at the level of the second   metatarsal with adjacent skin thickening and subcutaneous fat   infiltration and edema signal, consistent with cellulitis. The wound is   suspected to communicate with a subcentimeter collection in the dorsal   subcutaneous tissues at this level. There are no areas of marrow signal   alteration that raise suspicion for osteomyelitis. There are mild   osteoarthritic changes at the midfoot and tarsometatarsal articulations,   most pronounced at the second tarsometatarsal articulation. There is   diffuse fatty atrophy and high T2 signal of musculature, consistent with   denervation.    IMPRESSION: Dorsal cutaneous wound with adjacentcellulitis. No   osteomyelitis.    --- End of Report ---            RISHI SIDDIQI MD; Attending Radiologist  This document has been electronically signed. Dec  4 2024 10:56AM  
    Chief Complaint: type 2 DM    History: Tolerating PO diet. Pt did not get morning FS checked as pt was not on unit. FS was not checked until 1130am and was above goal. FS last night at dinner time and bedtime were at goal. pt stated she did not eat until after 1130.     MEDICATIONS  (STANDING):  albuterol/ipratropium for Nebulization 3 milliLiter(s) Nebulizer every 6 hours  aspirin  chewable 81 milliGRAM(s) Oral daily  dextrose 5%. 1000 milliLiter(s) (50 mL/Hr) IV Continuous <Continuous>  dextrose 5%. 1000 milliLiter(s) (100 mL/Hr) IV Continuous <Continuous>  dextrose 50% Injectable 25 Gram(s) IV Push once  dextrose 50% Injectable 12.5 Gram(s) IV Push once  dextrose 50% Injectable 25 Gram(s) IV Push once  dextrose Oral Gel 15 Gram(s) Oral once  enoxaparin Injectable 40 milliGRAM(s) SubCutaneous every 12 hours  gabapentin 600 milliGRAM(s) Oral three times a day  glucagon  Injectable 1 milliGRAM(s) IntraMuscular once  insulin glargine Injectable (LANTUS) 31 Unit(s) SubCutaneous at bedtime  insulin lispro (ADMELOG) corrective regimen sliding scale   SubCutaneous three times a day before meals  insulin lispro (ADMELOG) corrective regimen sliding scale   SubCutaneous at bedtime  insulin lispro Injectable (ADMELOG) 28 Unit(s) SubCutaneous three times a day before meals  piperacillin/tazobactam IVPB.. 3.375 Gram(s) IV Intermittent every 8 hours  rosuvastatin 20 milliGRAM(s) Oral at bedtime  senna 2 Tablet(s) Oral at bedtime    MEDICATIONS  (PRN):  acetaminophen     Tablet .. 650 milliGRAM(s) Oral every 6 hours PRN Temp greater or equal to 38C (100.4F), Mild Pain (1 - 3)  benzonatate 100 milliGRAM(s) Oral every 8 hours PRN Cough  melatonin 3 milliGRAM(s) Oral at bedtime PRN Insomnia  morphine  - Injectable 2 milliGRAM(s) IV Push every 6 hours PRN Moderate Pain (4 - 6)  morphine  - Injectable 4 milliGRAM(s) IV Push every 6 hours PRN Severe Pain (7 - 10)  polyethylene glycol 3350 17 Gram(s) Oral daily PRN Constipation      Allergies    No Known Allergies    Intolerances      Review of Systems:  Cardiovascular: No chest pain, palpitations  Respiratory: No SOB, no cough  GI: No nausea, vomiting, abdominal pain  : No dysuria  Endocrine: no polyuria, polydipsia      PHYSICAL EXAM:  VITALS: T(C): 37.1 (12-04-24 @ 11:20)  T(F): 98.8 (12-04-24 @ 11:20), Max: 99.2 (12-03-24 @ 18:38)  HR: 79 (12-04-24 @ 11:20) (73 - 98)  BP: 142/63 (12-04-24 @ 11:20) (113/61 - 156/75)  RR:  (17 - 19)  SpO2:  (95% - 100%)  Wt(kg): --  GENERAL: NAD, well-groomed, well-developed  EYES: No proptosis  HEENT:  Atraumatic, Normocephalic  RESPIRATORY: non labored breathing   CARDIOVASCULAR: Regular rate and rhythm  GI: Soft, nontender, non distended  PSYCH: Alert and oriented x 3, normal affect, normal mood     CAPILLARY BLOOD GLUCOSE      POCT Blood Glucose.: 287 mg/dL (04 Dec 2024 11:32)  POCT Blood Glucose.: 164 mg/dL (03 Dec 2024 20:45)  POCT Blood Glucose.: 143 mg/dL (03 Dec 2024 17:06)      12-03    133[L]  |  99  |  10  ----------------------------<  308[H]  4.4   |  25  |  0.36[L]    eGFR: 113    Ca    9.0      12-03  Mg     1.90     12-03  Phos  2.7     12-03            Thyroid Function Tests:        A1C with Estimated Average Glucose Result: 13.4 % (12-01-24 @ 13:11)          Diet, Regular:   Consistent Carbohydrate No Snacks (CSTCHO)  DASH/TLC Sodium & Cholesterol Restricted (DASH) (12-01-24 @ 23:40)        
    Chief Complaint: type 2 DM    History: Tolerating PO diet. FS above goal today. Pt FS at bedtime and this morning above goal, pt had snack late last night.     MEDICATIONS  (STANDING):  albuterol/ipratropium for Nebulization 3 milliLiter(s) Nebulizer every 6 hours  aspirin  chewable 81 milliGRAM(s) Oral daily  dextrose 5%. 1000 milliLiter(s) (50 mL/Hr) IV Continuous <Continuous>  dextrose 5%. 1000 milliLiter(s) (100 mL/Hr) IV Continuous <Continuous>  dextrose 50% Injectable 25 Gram(s) IV Push once  dextrose 50% Injectable 12.5 Gram(s) IV Push once  dextrose 50% Injectable 25 Gram(s) IV Push once  dextrose Oral Gel 15 Gram(s) Oral once  enoxaparin Injectable 40 milliGRAM(s) SubCutaneous every 12 hours  gabapentin 600 milliGRAM(s) Oral three times a day  glucagon  Injectable 1 milliGRAM(s) IntraMuscular once  insulin glargine Injectable (LANTUS) 31 Unit(s) SubCutaneous at bedtime  insulin lispro (ADMELOG) corrective regimen sliding scale   SubCutaneous three times a day before meals  insulin lispro (ADMELOG) corrective regimen sliding scale   SubCutaneous at bedtime  insulin lispro Injectable (ADMELOG) 28 Unit(s) SubCutaneous three times a day before meals  piperacillin/tazobactam IVPB.. 3.375 Gram(s) IV Intermittent every 8 hours  rosuvastatin 20 milliGRAM(s) Oral at bedtime    MEDICATIONS  (PRN):  acetaminophen     Tablet .. 650 milliGRAM(s) Oral every 6 hours PRN Temp greater or equal to 38C (100.4F), Mild Pain (1 - 3)  benzonatate 100 milliGRAM(s) Oral every 8 hours PRN Cough  melatonin 3 milliGRAM(s) Oral at bedtime PRN Insomnia  morphine  - Injectable 2 milliGRAM(s) IV Push every 6 hours PRN Moderate Pain (4 - 6)  morphine  - Injectable 4 milliGRAM(s) IV Push every 6 hours PRN Severe Pain (7 - 10)      Allergies    No Known Allergies    Intolerances      Review of Systems:  Cardiovascular: No chest pain, palpitations  Respiratory: No SOB, no cough  GI: No nausea, vomiting, abdominal pain  : No dysuria  Endocrine: no polyuria, polydipsia      PHYSICAL EXAM:  VITALS: T(C): 36.7 (12-03-24 @ 11:21)  T(F): 98 (12-03-24 @ 11:21), Max: 100.8 (12-02-24 @ 22:37)  HR: 82 (12-03-24 @ 11:21) (72 - 94)  BP: 136/77 (12-03-24 @ 11:21) (115/62 - 144/66)  RR:  (16 - 18)  SpO2:  (98% - 100%)  Wt(kg): --  GENERAL: NAD, well-groomed, well-developed  EYES: No proptosis  HEENT:  Atraumatic, Normocephalic  RESPIRATORY: non labored breathing   CARDIOVASCULAR: Regular rate and rhythm  GI: Soft, nontender, non distended  PSYCH: Alert and oriented x 3, normal affect, normal mood       CAPILLARY BLOOD GLUCOSE      POCT Blood Glucose.: 301 mg/dL (03 Dec 2024 11:46)  POCT Blood Glucose.: 305 mg/dL (03 Dec 2024 08:35)  POCT Blood Glucose.: 220 mg/dL (02 Dec 2024 22:58)  POCT Blood Glucose.: 117 mg/dL (02 Dec 2024 17:56)      12-03    133[L]  |  99  |  10  ----------------------------<  308[H]  4.4   |  25  |  0.36[L]    eGFR: 113    Ca    9.0      12-03  Mg     1.90     12-03  Phos  2.7     12-03    TPro  8.0  /  Alb  3.9  /  TBili  0.5  /  DBili  x   /  AST  12  /  ALT  8   /  AlkPhos  124[H]  12-01          Thyroid Function Tests:        A1C with Estimated Average Glucose Result: 13.4 % (12-01-24 @ 13:11)          Diet, Regular:   Consistent Carbohydrate No Snacks (CSTCHO)  DASH/TLC Sodium & Cholesterol Restricted (DASH) (12-01-24 @ 23:40)        
[facility] Division of Hospital Medicine  Kahlil SharmaDO  Available via MS Teams  In house pager [phone]    SUBJECTIVE / OVERNIGHT EVENTS:  Admitted overnight, no acute events in interim. Seen and examined at bedside this morning.  Reports weakness and pain ongoing in her right foot.      ADDITIONAL REVIEW OF SYSTEMS:    MEDICATIONS  (STANDING):  dextrose 5%. 1000 milliLiter(s) (50 mL/Hr) IV Continuous <Continuous>  dextrose 5%. 1000 milliLiter(s) (100 mL/Hr) IV Continuous <Continuous>  dextrose 50% Injectable 25 Gram(s) IV Push once  dextrose 50% Injectable 12.5 Gram(s) IV Push once  dextrose 50% Injectable 25 Gram(s) IV Push once  dextrose Oral Gel 15 Gram(s) Oral once  enoxaparin Injectable 40 milliGRAM(s) SubCutaneous every 12 hours  gabapentin 600 milliGRAM(s) Oral three times a day  glucagon  Injectable 1 milliGRAM(s) IntraMuscular once  insulin glargine Injectable (LANTUS) 26 Unit(s) SubCutaneous at bedtime  insulin lispro (ADMELOG) corrective regimen sliding scale   SubCutaneous three times a day before meals  insulin lispro (ADMELOG) corrective regimen sliding scale   SubCutaneous at bedtime  insulin lispro Injectable (ADMELOG) 24 Unit(s) SubCutaneous three times a day before meals  ketorolac   Injectable 15 milliGRAM(s) IV Push once  piperacillin/tazobactam IVPB.- 3.375 Gram(s) IV Intermittent once  piperacillin/tazobactam IVPB.. 3.375 Gram(s) IV Intermittent every 8 hours  vancomycin  IVPB 1250 milliGRAM(s) IV Intermittent every 12 hours    MEDICATIONS  (PRN):  acetaminophen     Tablet .. 650 milliGRAM(s) Oral every 6 hours PRN Temp greater or equal to 38C (100.4F), Mild Pain (1 - 3)  benzonatate 100 milliGRAM(s) Oral every 8 hours PRN Cough  melatonin 3 milliGRAM(s) Oral at bedtime PRN Insomnia  morphine  - Injectable 2 milliGRAM(s) IV Push every 6 hours PRN Moderate Pain (4 - 6)  morphine  - Injectable 4 milliGRAM(s) IV Push every 6 hours PRN Severe Pain (7 - 10)      I&O's Summary      PHYSICAL EXAM:  Vital Signs Last 24 Hrs  T(C): 38 (02 Dec 2024 10:30), Max: 38 (02 Dec 2024 10:30)  T(F): 100.4 (02 Dec 2024 10:30), Max: 100.4 (02 Dec 2024 10:30)  HR: 90 (02 Dec 2024 10:30) (63 - 93)  BP: 117/89 (02 Dec 2024 10:30) (103/56 - 146/73)  BP(mean): --  RR: 18 (02 Dec 2024 10:30) (17 - 20)  SpO2: 96% (02 Dec 2024 10:30) (96% - 99%)    Parameters below as of 02 Dec 2024 05:21  Patient On (Oxygen Delivery Method): room air        LABS:                        10.3   14.31 )-----------( 315      ( 02 Dec 2024 07:19 )             31.5     12-02    138  |  102  |  11  ----------------------------<  176[H]  3.6   |  26  |  0.34[L]    Ca    8.9      02 Dec 2024 07:19  Phos  2.7     12-02  Mg     1.80     12-02    TPro  8.0  /  Alb  3.9  /  TBili  0.5  /  DBili  x   /  AST  12  /  ALT  8   /  AlkPhos  124[H]  12-01          Urinalysis Basic - ( 02 Dec 2024 07:19 )    Color: x / Appearance: x / SG: x / pH: x  Gluc: 176 mg/dL / Ketone: x  / Bili: x / Urobili: x   Blood: x / Protein: x / Nitrite: x   Leuk Esterase: x / RBC: x / WBC x   Sq Epi: x / Non Sq Epi: x / Bacteria: x    
[facility] Division of Hospital Medicine  Kahlil SharmaDO  Available via MS Teams  In house pager [phone]    SUBJECTIVE / OVERNIGHT EVENTS:  No acute events overnight. Patient seen and examined at bedside this morning.  Went for MRI this morning. Refused IV contrast, saying it made her "sick" before but no allergic reaction reported.    Still in pain in the right foot. Asked for a dose of morphine earlier.    ADDITIONAL REVIEW OF SYSTEMS:    MEDICATIONS  (STANDING):  albuterol/ipratropium for Nebulization 3 milliLiter(s) Nebulizer every 6 hours  aspirin  chewable 81 milliGRAM(s) Oral daily  dextrose 5%. 1000 milliLiter(s) (50 mL/Hr) IV Continuous <Continuous>  dextrose 5%. 1000 milliLiter(s) (100 mL/Hr) IV Continuous <Continuous>  dextrose 50% Injectable 25 Gram(s) IV Push once  dextrose 50% Injectable 12.5 Gram(s) IV Push once  dextrose 50% Injectable 25 Gram(s) IV Push once  dextrose Oral Gel 15 Gram(s) Oral once  enoxaparin Injectable 40 milliGRAM(s) SubCutaneous every 12 hours  gabapentin 600 milliGRAM(s) Oral three times a day  glucagon  Injectable 1 milliGRAM(s) IntraMuscular once  insulin glargine Injectable (LANTUS) 31 Unit(s) SubCutaneous at bedtime  insulin lispro (ADMELOG) corrective regimen sliding scale   SubCutaneous three times a day before meals  insulin lispro (ADMELOG) corrective regimen sliding scale   SubCutaneous at bedtime  insulin lispro Injectable (ADMELOG) 28 Unit(s) SubCutaneous three times a day before meals  piperacillin/tazobactam IVPB.. 3.375 Gram(s) IV Intermittent every 8 hours  rosuvastatin 20 milliGRAM(s) Oral at bedtime  senna 2 Tablet(s) Oral at bedtime    MEDICATIONS  (PRN):  acetaminophen     Tablet .. 650 milliGRAM(s) Oral every 6 hours PRN Temp greater or equal to 38C (100.4F), Mild Pain (1 - 3)  benzonatate 100 milliGRAM(s) Oral every 8 hours PRN Cough  melatonin 3 milliGRAM(s) Oral at bedtime PRN Insomnia  morphine  - Injectable 2 milliGRAM(s) IV Push every 6 hours PRN Moderate Pain (4 - 6)  morphine  - Injectable 4 milliGRAM(s) IV Push every 6 hours PRN Severe Pain (7 - 10)  polyethylene glycol 3350 17 Gram(s) Oral daily PRN Constipation      I&O's Summary      PHYSICAL EXAM:  Vital Signs Last 24 Hrs  T(C): 37.1 (04 Dec 2024 11:20), Max: 37.3 (03 Dec 2024 18:38)  T(F): 98.8 (04 Dec 2024 11:20), Max: 99.2 (03 Dec 2024 18:38)  HR: 79 (04 Dec 2024 11:20) (73 - 98)  BP: 142/63 (04 Dec 2024 11:20) (113/61 - 156/75)  BP(mean): --  RR: 19 (04 Dec 2024 11:20) (17 - 19)  SpO2: 96% (04 Dec 2024 11:20) (95% - 100%)    Parameters below as of 04 Dec 2024 11:20  Patient On (Oxygen Delivery Method): room air      LABS:                        10.8   11.55 )-----------( 318      ( 03 Dec 2024 07:00 )             34.2     12-03    133[L]  |  99  |  10  ----------------------------<  308[H]  4.4   |  25  |  0.36[L]    Ca    9.0      03 Dec 2024 07:00  Phos  2.7     12-03  Mg     1.90     12-03            Urinalysis Basic - ( 03 Dec 2024 07:00 )    Color: x / Appearance: x / SG: x / pH: x  Gluc: 308 mg/dL / Ketone: x  / Bili: x / Urobili: x   Blood: x / Protein: x / Nitrite: x   Leuk Esterase: x / RBC: x / WBC x   Sq Epi: x / Non Sq Epi: x / Bacteria: x        Culture - Abscess with Gram Stain (collected 02 Dec 2024 15:52)  Source: .Abscess  Gram Stain (03 Dec 2024 17:29):    Rare polymorphonuclear leukocytes seen per low power field    No organisms seen per oil power field  Preliminary Report (03 Dec 2024 17:29):    Rare Streptococcus agalactiae (Group B)    Culture - Blood (collected 01 Dec 2024 21:45)  Source: .Blood BLOOD  Preliminary Report (04 Dec 2024 05:01):    No growth at 48 Hours    Culture - Blood (collected 01 Dec 2024 21:30)  Source: .Blood BLOOD  Preliminary Report (04 Dec 2024 05:01):    No growth at 48 Hours    
[facility] Division of Hospital Medicine  Kahlil SharmaDO  Available via MS Teams  In house pager [phone]    SUBJECTIVE / OVERNIGHT EVENTS:  No acute events overnight. Patient seen and examined at bedside this morning.      ADDITIONAL REVIEW OF SYSTEMS:    MEDICATIONS  (STANDING):  albuterol/ipratropium for Nebulization 3 milliLiter(s) Nebulizer every 6 hours  ampicillin/sulbactam  IVPB 3 Gram(s) IV Intermittent every 6 hours  ampicillin/sulbactam  IVPB      aspirin  chewable 81 milliGRAM(s) Oral daily  dextrose 5%. 1000 milliLiter(s) (50 mL/Hr) IV Continuous <Continuous>  dextrose 5%. 1000 milliLiter(s) (100 mL/Hr) IV Continuous <Continuous>  dextrose 50% Injectable 25 Gram(s) IV Push once  dextrose 50% Injectable 12.5 Gram(s) IV Push once  dextrose 50% Injectable 25 Gram(s) IV Push once  dextrose Oral Gel 15 Gram(s) Oral once  enoxaparin Injectable 40 milliGRAM(s) SubCutaneous every 12 hours  gabapentin 600 milliGRAM(s) Oral three times a day  glucagon  Injectable 1 milliGRAM(s) IntraMuscular once  insulin glargine Injectable (LANTUS) 36 Unit(s) SubCutaneous at bedtime  insulin lispro (ADMELOG) corrective regimen sliding scale   SubCutaneous three times a day before meals  insulin lispro (ADMELOG) corrective regimen sliding scale   SubCutaneous at bedtime  insulin lispro Injectable (ADMELOG) 32 Unit(s) SubCutaneous three times a day before meals  lactobacillus acidophilus 1 Tablet(s) Oral two times a day with meals  rosuvastatin 20 milliGRAM(s) Oral at bedtime  senna 2 Tablet(s) Oral at bedtime    MEDICATIONS  (PRN):  acetaminophen     Tablet .. 650 milliGRAM(s) Oral every 6 hours PRN Temp greater or equal to 38C (100.4F), Mild Pain (1 - 3)  benzonatate 100 milliGRAM(s) Oral every 8 hours PRN Cough  melatonin 3 milliGRAM(s) Oral at bedtime PRN Insomnia  polyethylene glycol 3350 17 Gram(s) Oral daily PRN Constipation      I&O's Summary      PHYSICAL EXAM:  Vital Signs Last 24 Hrs  T(C): 36.8 (06 Dec 2024 10:05), Max: 36.9 (05 Dec 2024 17:54)  T(F): 98.3 (06 Dec 2024 10:05), Max: 98.5 (05 Dec 2024 17:54)  HR: 88 (06 Dec 2024 10:05) (71 - 97)  BP: 134/71 (06 Dec 2024 10:05) (120/73 - 146/77)  BP(mean): --  RR: 18 (06 Dec 2024 10:05) (18 - 18)  SpO2: 99% (06 Dec 2024 10:05) (97% - 100%)    Parameters below as of 06 Dec 2024 08:42  Patient On (Oxygen Delivery Method): room air        LABS:                        10.0   6.28  )-----------( 353      ( 06 Dec 2024 07:33 )             30.4     12-06    138  |  104  |  9   ----------------------------<  239[H]  3.9   |  23  |  0.31[L]    Ca    9.1      06 Dec 2024 07:33  Phos  3.1     12-06  Mg     1.90     12-06            Urinalysis Basic - ( 06 Dec 2024 07:33 )    Color: x / Appearance: x / SG: x / pH: x  Gluc: 239 mg/dL / Ketone: x  / Bili: x / Urobili: x   Blood: x / Protein: x / Nitrite: x   Leuk Esterase: x / RBC: x / WBC x   Sq Epi: x / Non Sq Epi: x / Bacteria: x      
[facility] Division of Hospital Medicine  Kahlil SharmaDO  Available via MS Teams  In house pager [phone]    SUBJECTIVE / OVERNIGHT EVENTS:  No acute events overnight. Patient seen and examined at bedside this morning.  States she still has some right foot pain, which she attributes to ambulating to the bathroom late last night to try to change her clothing.    ADDITIONAL REVIEW OF SYSTEMS:    MEDICATIONS  (STANDING):  albuterol/ipratropium for Nebulization 3 milliLiter(s) Nebulizer every 6 hours  ampicillin/sulbactam  IVPB 3 Gram(s) IV Intermittent every 6 hours  ampicillin/sulbactam  IVPB      aspirin  chewable 81 milliGRAM(s) Oral daily  dextrose 5%. 1000 milliLiter(s) (50 mL/Hr) IV Continuous <Continuous>  dextrose 5%. 1000 milliLiter(s) (100 mL/Hr) IV Continuous <Continuous>  dextrose 50% Injectable 25 Gram(s) IV Push once  dextrose 50% Injectable 12.5 Gram(s) IV Push once  dextrose 50% Injectable 25 Gram(s) IV Push once  dextrose Oral Gel 15 Gram(s) Oral once  enoxaparin Injectable 40 milliGRAM(s) SubCutaneous every 12 hours  gabapentin 600 milliGRAM(s) Oral three times a day  glucagon  Injectable 1 milliGRAM(s) IntraMuscular once  insulin glargine Injectable (LANTUS) 36 Unit(s) SubCutaneous at bedtime  insulin lispro (ADMELOG) corrective regimen sliding scale   SubCutaneous three times a day before meals  insulin lispro (ADMELOG) corrective regimen sliding scale   SubCutaneous at bedtime  insulin lispro Injectable (ADMELOG) 32 Unit(s) SubCutaneous three times a day before meals  lactobacillus acidophilus 1 Tablet(s) Oral two times a day with meals  rosuvastatin 20 milliGRAM(s) Oral at bedtime  senna 2 Tablet(s) Oral at bedtime    MEDICATIONS  (PRN):  acetaminophen     Tablet .. 650 milliGRAM(s) Oral every 6 hours PRN Temp greater or equal to 38C (100.4F), Mild Pain (1 - 3)  benzonatate 100 milliGRAM(s) Oral every 8 hours PRN Cough  melatonin 3 milliGRAM(s) Oral at bedtime PRN Insomnia  oxyCODONE    IR 2.5 milliGRAM(s) Oral every 6 hours PRN Moderate Pain (4 - 6)  oxyCODONE    IR 5 milliGRAM(s) Oral every 6 hours PRN Severe Pain (7 - 10)  polyethylene glycol 3350 17 Gram(s) Oral daily PRN Constipation      I&O's Summary      PHYSICAL EXAM:  Vital Signs Last 24 Hrs  T(C): 36.9 (05 Dec 2024 10:05), Max: 37.1 (04 Dec 2024 14:51)  T(F): 98.4 (05 Dec 2024 10:05), Max: 98.7 (04 Dec 2024 14:51)  HR: 85 (05 Dec 2024 10:05) (69 - 85)  BP: 123/63 (05 Dec 2024 10:05) (123/63 - 137/66)  BP(mean): --  RR: 18 (05 Dec 2024 10:05) (17 - 18)  SpO2: 96% (05 Dec 2024 10:05) (93% - 99%)    Parameters below as of 05 Dec 2024 10:05  Patient On (Oxygen Delivery Method): room air        LABS:                        10.1   6.99  )-----------( 319      ( 05 Dec 2024 07:35 )             31.0     12-05    139  |  103  |  9   ----------------------------<  225[H]  3.9   |  26  |  0.37[L]    Ca    9.1      05 Dec 2024 07:35  Phos  2.9     12-05  Mg     2.00     12-05            Urinalysis Basic - ( 05 Dec 2024 07:35 )    Color: x / Appearance: x / SG: x / pH: x  Gluc: 225 mg/dL / Ketone: x  / Bili: x / Urobili: x   Blood: x / Protein: x / Nitrite: x   Leuk Esterase: x / RBC: x / WBC x   Sq Epi: x / Non Sq Epi: x / Bacteria: x        Culture - Abscess with Gram Stain (collected 02 Dec 2024 15:52)  Source: .Abscess  Gram Stain (03 Dec 2024 17:29):    Rare polymorphonuclear leukocytes seen per low power field    No organisms seen per oil power field  Preliminary Report (04 Dec 2024 16:10):    Rare Streptococcus agalactiae (Group B)    Group B streptococci are susceptible to ampicillin,    penicillin and cefazolin, but may be resistant to    erythromycin and clindamycin.    
[facility] of [facility]  Kahlil SharmaDO  Available via MS Teams  In house pager [phone]    SUBJECTIVE / OVERNIGHT EVENTS:  No acute events overnight. Patient seen and examined at bedside this morning.  Reports ongoing right foot pain and associated difficulty with PT.  States her chronic cough is returning, asking for duonebs.  Seen again by podiatry team this morning.    ADDITIONAL REVIEW OF SYSTEMS:    MEDICATIONS  (STANDING):  albuterol/ipratropium for Nebulization 3 milliLiter(s) Nebulizer every 6 hours  dextrose 5%. 1000 milliLiter(s) (50 mL/Hr) IV Continuous <Continuous>  dextrose 5%. 1000 milliLiter(s) (100 mL/Hr) IV Continuous <Continuous>  dextrose 50% Injectable 25 Gram(s) IV Push once  dextrose 50% Injectable 12.5 Gram(s) IV Push once  dextrose 50% Injectable 25 Gram(s) IV Push once  dextrose Oral Gel 15 Gram(s) Oral once  enoxaparin Injectable 40 milliGRAM(s) SubCutaneous every 12 hours  gabapentin 600 milliGRAM(s) Oral three times a day  glucagon  Injectable 1 milliGRAM(s) IntraMuscular once  insulin glargine Injectable (LANTUS) 26 Unit(s) SubCutaneous at bedtime  insulin lispro (ADMELOG) corrective regimen sliding scale   SubCutaneous three times a day before meals  insulin lispro (ADMELOG) corrective regimen sliding scale   SubCutaneous at bedtime  insulin lispro Injectable (ADMELOG) 24 Unit(s) SubCutaneous three times a day before meals  piperacillin/tazobactam IVPB.. 3.375 Gram(s) IV Intermittent every 8 hours    MEDICATIONS  (PRN):  acetaminophen     Tablet .. 650 milliGRAM(s) Oral every 6 hours PRN Temp greater or equal to 38C (100.4F), Mild Pain (1 - 3)  benzonatate 100 milliGRAM(s) Oral every 8 hours PRN Cough  melatonin 3 milliGRAM(s) Oral at bedtime PRN Insomnia  morphine  - Injectable 2 milliGRAM(s) IV Push every 6 hours PRN Moderate Pain (4 - 6)  morphine  - Injectable 4 milliGRAM(s) IV Push every 6 hours PRN Severe Pain (7 - 10)      I&O's Summary      PHYSICAL EXAM:  Vital Signs Last 24 Hrs  T(C): 36.7 (03 Dec 2024 11:21), Max: 38.2 (02 Dec 2024 22:37)  T(F): 98 (03 Dec 2024 11:21), Max: 100.8 (02 Dec 2024 22:37)  HR: 82 (03 Dec 2024 11:21) (72 - 94)  BP: 136/77 (03 Dec 2024 11:21) (115/62 - 144/66)  BP(mean): --  RR: 17 (03 Dec 2024 11:21) (16 - 18)  SpO2: 99% (03 Dec 2024 11:21) (98% - 100%)    Parameters below as of 03 Dec 2024 11:21  Patient On (Oxygen Delivery Method): room air    LABS:                        10.8   11.55 )-----------( 318      ( 03 Dec 2024 07:00 )             34.2     12-03    133[L]  |  99  |  10  ----------------------------<  308[H]  4.4   |  25  |  0.36[L]    Ca    9.0      03 Dec 2024 07:00  Phos  2.7     12-03  Mg     1.90     12-03            Urinalysis Basic - ( 03 Dec 2024 07:00 )    Color: x / Appearance: x / SG: x / pH: x  Gluc: 308 mg/dL / Ketone: x  / Bili: x / Urobili: x   Blood: x / Protein: x / Nitrite: x   Leuk Esterase: x / RBC: x / WBC x   Sq Epi: x / Non Sq Epi: x / Bacteria: x        Culture - Abscess with Gram Stain (collected 02 Dec 2024 15:52)  Source: .Abscess  Gram Stain (02 Dec 2024 23:58):    Rare polymorphonuclear leukocytes seen per low power field    No organisms seen per oil power field    Culture - Blood (collected 01 Dec 2024 21:45)  Source: .Blood BLOOD  Preliminary Report (03 Dec 2024 05:00):    No growth at 24 hours    Culture - Blood (collected 01 Dec 2024 21:30)  Source: .Blood BLOOD  Preliminary Report (03 Dec 2024 05:00):    No growth at 24 hours

## 2024-12-06 NOTE — PROGRESS NOTE ADULT - PROBLEM SELECTOR PROBLEM 1
Uncontrolled type 2 diabetes mellitus with hyperglycemia
Diabetes mellitus with hyperglycemia

## 2024-12-06 NOTE — DISCHARGE NOTE NURSING/CASE MANAGEMENT/SOCIAL WORK - FINANCIAL ASSISTANCE
[facility] provides services at a reduced cost to those who are determined to be eligible through [facility]’s financial assistance program. Information regarding [facility]’s financial assistance program can be found by going to [email or web address] or by calling [phone].

## 2024-12-06 NOTE — PROGRESS NOTE ADULT - PROBLEM SELECTOR PLAN 6
Resume home aspirin, losartan, rosuvastatin.
Pt unsure of dosage of home medications. med rec pharmacist emailed
Resume home aspirin, losartan, rosuvastatin.

## 2024-12-06 NOTE — DISCHARGE NOTE NURSING/CASE MANAGEMENT/SOCIAL WORK - PATIENT PORTAL LINK FT
You can access the FollowMyHealth Patient Portal offered by [facility] by registering at the following website: [email or web address]. By joining [facility]’s FollowMyHealth portal, you will also be able to view your health information using other applications (apps) compatible with our system.

## 2024-12-06 NOTE — PROGRESS NOTE ADULT - PROBLEM SELECTOR PLAN 7
DVT prophylaxis: lovenox  Diet:; dash/tlc, cc
DVT prophylaxis: lovenox  Diet:; dash/tlc, cc    Possible dc to home today

## 2024-12-06 NOTE — PROGRESS NOTE ADULT - PROBLEM SELECTOR PLAN 2
likely in setting of UTI, right foot cellulitis vs abscess, and possible PNA - right lower opacity on CXR, pt did complete course of doxy 3-4 weeks ago.    ID following, MRSA PCR neg. Stopped vancomycin.   Wound Cx with Group B strep.  ID following. Zosyn switched to Unasyn.
likely in setting of UTI, right foot cellulitis vs abscess, and possible PNA - right lower opacity on CXR, pt did complete course of doxy 3-4 weeks ago.    ID following, MRSA PCR neg. Stopped vancomycin. Continue zosyn.
likely in setting of UTI, right foot cellulitis vs abscess, and possible PNA - right lower opacity on CXR, pt did complete course of doxy 3-4 weeks ago.  - c/w vanc/zosyn  - f/u ucx, bcx
likely in setting of UTI, right foot cellulitis vs abscess, and possible PNA - right lower opacity on CXR, pt did complete course of doxy 3-4 weeks ago.    ID following, MRSA PCR neg. Stopped vancomycin.   Wound Cx with Group B strep.  On zosyn. ID following.
likely in setting of UTI, right foot cellulitis vs abscess, and possible PNA - right lower opacity on CXR, pt did complete course of doxy 3-4 weeks ago.    ID following, MRSA PCR neg. Stopped vancomycin.   Wound Cx with Group B strep.  ID following. Zosyn switched to Unasyn.  Will discuss with ID to potentially switch to Augmentin

## 2024-12-06 NOTE — DISCHARGE NOTE NURSING/CASE MANAGEMENT/SOCIAL WORK - NSDCPEFALRISK_GEN_ALL_CORE
For information on Fall & Injury Prevention, visit: [email or web address] OR  [email or web address] OR  https://www.cdc.gov/steadi/patient.html

## 2024-12-06 NOTE — PROGRESS NOTE ADULT - PROBLEM SELECTOR PLAN 1
A1C 13.4. No evidence of dka. Seen by endocrine, recs appreciated. Pt states she is on home lantus 28 units QHS and lispro 30 units TID w/ meals.  - c/w gabapentin for neuropathy  - CC diet.  - Lantus 26 units SQ QHS  - Admelog 24 units QAC  - moderate correction scale QAC and QHS  - Endocrinology following, appreciate input regarding insulin titration.
A1C 13.4. No evidence of dka. Seen by endocrine, recs appreciated. Pt states she is on home lantus 28 units QHS and lispro 30 units TID w/ meals.  - c/w gabapentin for neuropathy  - CC diet.  - Lantus 31 units SQ QHS  - Admelog 28 units QAC  - moderate correction scale QAC and QHS  - Endocrinology following, appreciate input regarding insulin titration.    Script sent for [employer] for price check.   Needs ophtho eval as outpt.
A1C 13.4. No evidence of dka. Seen by endocrine, recs appreciated. Pt states she is on home lantus 28 units QHS and lispro 30 units TID w/ meals.  - c/w gabapentin for neuropathy  - CC diet.  - Lantus 26 units SQ QHS  - Admelog 24 units QAC  - moderate correction scale QAC and QHS  - Endocrinology following, appreciate input regarding insulin titration.
A1C 13.4. No evidence of dka. Seen by endocrine, recs appreciated. Pt states she is on home lantus 28 units QHS and lispro 30 units TID w/ meals.  - c/w gabapentin for neuropathy  - CC diet.  - Lantus 31 units SQ QHS  - Admelog 28 units QAC  - moderate correction scale QAC and QHS  - Endocrinology following, appreciate input regarding insulin titration.
A1C 13.4. No evidence of dka. Seen by endocrine, recs appreciated. Pt states she is on home lantus 28 units QHS and lispro 30 units TID w/ meals.  - glucose <300, will start diet and lantus 26 units qhs, lispro 24 units TID, mod ISS and low ISS at bedtime.  - f/u official endocrine recs  - c/w gabapentin for neuropathy

## 2024-12-06 NOTE — PROGRESS NOTE ADULT - NEUROLOGICAL
cranial nerves II-XII intact/sensation intact/responds to pain/responds to verbal commands

## 2024-12-06 NOTE — PROGRESS NOTE ADULT - REASON FOR ADMISSION
hyperglycemia, cough, leg pain

## 2024-12-06 NOTE — PROGRESS NOTE ADULT - PROBLEM SELECTOR PROBLEM 3
Other hyperlipidemia
Cellulitis of right foot
Other hyperlipidemia
Other hyperlipidemia
Cellulitis of right foot
Other hyperlipidemia
Cellulitis of right foot

## 2024-12-06 NOTE — PROGRESS NOTE ADULT - EYES
PERRL/EOMI/conjunctiva clear
PERRL/EOMI/conjunctiva clear/non icteric sclera
PERRL/EOMI/conjunctiva clear
PERRL/EOMI/conjunctiva clear/normal
PERRL/EOMI/conjunctiva clear/normal

## 2024-12-06 NOTE — PROGRESS NOTE ADULT - PROBLEM SELECTOR PLAN 5
Pt w/ complaint of cough x1 month, no resolution in symptoms after course of doxycyline 3-4 weeks ago. CXR w/ right basilar opacity  - c/w IV  zosyn  - f/u urine legionella  - MRSA PCR neg, off vancomycin
Pt w/ complaint of cough x1 month, no resolution in symptoms after course of doxycyline 3-4 weeks ago. CXR w/ right basilar opacity  - c/w IV  zosyn  - f/u urine legionella  - MRSA PCR neg, off vancomycin
Pt w/ complaint of cough x1 month, no resolution in symptoms after course of doxycyline 3-4 weeks ago. CXR w/ right basilar opacity  - c/w IV vanc and zosyn  - f/u urine legionella  - MRSA
Pt w/ complaint of cough x1 month, no resolution in symptoms after course of doxycyline 3-4 weeks ago. CXR w/ right basilar opacity  - c/w IV  zosyn  - f/u urine legionella  - MRSA PCR neg, off vancomycin
Pt w/ complaint of cough x1 month, no resolution in symptoms after course of doxycyline 3-4 weeks ago. CXR w/ right basilar opacity  - c/w IV  zosyn  - f/u urine legionella  - MRSA PCR neg, off vancomycin

## 2024-12-06 NOTE — PROGRESS NOTE ADULT - PROBLEM SELECTOR PLAN 4
U/A concerning for infection  - on IV abx as above
U/A concerning for infection  - on IV abx as above  - f/u UCx
U/A concerning for infection  - on IV abx as above
U/A concerning for infection  - on IV abx as above  - f/u UCx
U/A concerning for infection  - on IV abx as above  - f/u UCx

## 2024-12-06 NOTE — PROGRESS NOTE ADULT - ASSESSMENT
64F s/p b/s right foot incision and drainage (DOS 12/1/24)   - Patient seen and evaluated   - Afebrile, WBC 11.55  - 12/2 s/p b/s right foot incision and drainage: no malodor, no pus, mild erythema improvement.   - Right foot xray: pending   - Right foot wound cultured   - Recommend Rheum consult  - Recommend continue antibiotics  - Pod plan local wound care pending right foot xray  - Wound care instruction and follow up information in discharge note provider   - Seen with attending     
65 y/o female pmh htn, dm, asthma presents w/ complaint of cough x1 month, right foot pain/swelling x3 weeks and urinary frequency x3 weeks. Now s/p I&D of the R foot with podiatry     Assessment      -Pt with cough x1 month, right foot pain/swelling x3 weeks and urinary frequency x3 weeks but no dysuria   -Pt prescribed course of steroids and doxycycline which she completed  -Pt febrile with elevated WBC  -On podiatry exam: Right foot dorsal erythema with fluctuance. No open wounds bilaterally.  -Now s/p I&D of the R foot with podiatry with gouty material/ purulence expressed.    #Sepsis, fever, leucocytosis, tachycardia   rt LE cellulitis and abscess  strep infection      Recommendation:  -Cont  Zosyn for now.   -MRSA swab neg, d/randell Vanc  -F/U final I&D cx, prelim with Gp B strep  - f/u BCX, NTD   -F/U MR rt Foot  -podiatry on board.  - glycemic control for appropriate wound healing  -Cont to monitor fever and WBC curve        Carlie Jay  Please contact through MS Teams   If no response or past 5 pm/weekend call [phone]. 
63 y/o female pmh htn, dm, asthma presents w/ complaint of cough x1 month, right foot pain/swelling x3 weeks and urinary frequency x3 weeks. Now s/p I&D of the R foot with podiatry     Assessment      -Pt with cough x1 month, right foot pain/swelling x3 weeks and urinary frequency x3 weeks but no dysuria   -Pt prescribed course of steroids and doxycycline which she completed  -Pt febrile with elevated WBC  -On podiatry exam: Right foot dorsal erythema with fluctuance. No open wounds bilaterally.  -Now s/p I&D of the R foot with podiatry with gouty material/ purulence expressed.    #Sepsis, fever, leucocytosis, tachycardia, resolved   rt LE cellulitis and abscess  strep infection      Recommendation:  -recommend switching zosyn to unasyn    -MRSA swab neg,   -I&D cx with Gp B strep  - f/u BCX, NTD   -MR rt Foot with no OM, pt refused contrast.   -podiatry on board.  - glycemic control for appropriate wound healing  -Cont to monitor fever and WBC curve      Plan discussed with Medicine ACP.     Carlie Jay  Please contact through MS Teams   If no response or past 5 pm/weekend call [phone]. 
64F s/p b/s right foot incision and drainage (DOS 12/2/24)   - Patient seen and evaluated   - Afebrile, WBC 11.55  - 12/2 s/p b/s right foot incision and drainage: no malodor, no pus, mild erythema improvement.   - Right foot xray: pending   - Right foot MRI:  Dorsal cutaneous wound with adjacentcellulitis. No osteomyelitis.  - Right foot wound pending  - ID recs, appreciated  - Pod plan local wound care pending right foot xray  - Wound care instruction and follow up information in discharge note provider   - Seen with attending   
64F s/p b/s right foot incision and drainage (DOS 12/2/24)   - Patient seen and evaluated   - Afebrile, WBC 6.28  - 12/2 s/p b/s right foot incision and drainage: no malodor, no pus, mild erythema improvement.   - Right foot xray:  no gas, no OM  - Right foot MRI: no OM, no gas, no abscess  - Right foot wound culture S. agalactiae and Group B strep  - ID recs, appreciated  - Patient is stable for discharge from a podiatry standpoint. Patient can follow up with Dr. Sanders at the wound care center at [address] [place] [address].   - Wound care instruction and follow up information in discharge note provider   - Discussed  with attending 
64F s/p b/s right foot incision and drainage (DOS 12/2/24)   - Patient seen and evaluated   - Afebrile, WBC 6.99  - 12/2 s/p b/s right foot incision and drainage: no malodor, no pus, mild erythema improvement.   - Right foot xray: pending   - Right foot MRI:  Dorsal cutaneous wound with adjacentcellulitis. No osteomyelitis.  - Right foot wound pending  - ID recs, appreciated  - Pod plan local wound care pending right foot xray  - Wound care instruction and follow up information in discharge note provider   - Discussed  with attending   
65 y/o female pmh htn, dm, asthma presents w/ complaint of cough x1 month, right foot pain/swelling x3 weeks and urinary frequency x3 weeks. Now s/p I&D of the R foot with podiatry     Assessment      -Pt with cough x1 month, right foot pain/swelling x3 weeks and urinary frequency x3 weeks but no dysuria   -Pt prescribed course of steroids and doxycycline which she completed  -Pt febrile with elevated WBC  -On podiatry exam: Right foot dorsal erythema with fluctuance. No open wounds bilaterally.  -Now s/p I&D of the R foot with podiatry with gouty material/ purulence expressed.    #Sepsis, fever, leucocytosis, tachycardia, resolved   rt LE cellulitis and abscess  strep infection      Recommendation:  -On unasyn    -MRSA swab neg,   -I&D cx with Gp B strep  - f/u BCX, NTD   -MR rt Foot with no OM, pt refused contrast.   -podiatry on board.  - glycemic control for appropriate wound healing  -Cont to monitor fever and WBC curve  can transition to po augmentin 875 mg bid for 7 more days.   pt to follow with podiatry as outpt.       Plan discussed with Medicine ACP.     Carlie Jay  Please contact through MS Teams   If no response or past 5 pm/weekend call [phone]. 
Pt is a 63 y/o female PMH of htn, uncontrolled Type 2 DM, asthma presents w/ complaint of cough x1 month, right foot pain/swelling x3 weeks and urinary frequency x3 weeks and found to have hyperglycemia and sepsis.       High level medical complexity with high level decision making.      1. Type 2 DM- uncontrolled with Hba1c 13.4%  -GFR- 113  -home regimen: lantus 28 units at bedtime and novolog 30 units TID- has GCM- dexcome at home- says that they keep falling off.   -Blood glucose target is 100 to 180: FS above goal today. Pt FS at bedtime and this morning above goal, pt had snack late last night.   -Encouraged a carbohydrate consistent diet and avoiding regular sodas  -Increase Lantus to 31 units SQ QHS  -Increase Admelog to 28 units QAC. hold if not eating or NPO.   -Will continue moderate correction scale QAC and separate moderate scale QHS  -monitor FS with meals and at bedtime.   -For discharge: she would like to follow up with our [facility]- email sent today 12/2  -For discharge: will discharge on basal bolus regimen and consider GLP-1 RA- pt stated that in past she was on mounjaro and had chest pains- does not want to try mounjaro again, but she agreed to trying ozempic- please send script for ozempic 0.25mg weekly to check coverage- pt will need outpt ophthalmology appt as not had one recently- important prior to starting glp1.  -Will need outpatient follow up with ophthalmology and urine alb/cr ratio     2. HTN-  -BP goal is < 130/80  -Management as per primary team    3. Hyperlipidemia-  -agree with c/w crestor 20mg  -check lipid panel    ELIECER Chowdary-BC  Nurse Practitioner  Division of Endocrinology & Diabetes  pager [phone] 
Pt is a 65 y/o female pmh htn, dm, asthma presents w/ complaint of cough x1 month, right foot pain/swelling x3 weeks and urinary frequency x3 weeks. Pt admitted for uncontrolled DM w/ hyperglycemia and sepsis 2/2 UTI, pna and cellulitis. 
Pt is a 63 y/o female PMH of htn, uncontrolled Type 2 DM, asthma presents w/ complaint of cough x1 month, right foot pain/swelling x3 weeks and urinary frequency x3 weeks and found to have hyperglycemia and sepsis.     1. Type 2 DM- uncontrolled with Hba1c 13.4%  -GFR- 113  -home regimen: lantus 28 units at bedtime and novolog 30 units TID- has GCM- dexcom at home- says that they keep falling off.       While inpatient:   -Blood glucose target is 100 to 180: FS yesterday at dinner time and bedtime was at goal. This morning and afternoon FS above goal. Pt had cookies and milk late last night. Pt eats in between meals at times, advised not to. Therefoe will continue regimen below for another day since it was increased yesterday and she had a late night snack and she was at goal at bedtime last night.   -Continue Lantus 36 units SQ QHS  -Continue Admelog 32 units TID AC  hold if not eating or NPO.   -Continue moderate Admelog correction scale QAC and separate moderate Admelog correction scale QHS  -monitor FS with meals and at bedtime.   -consistent carb diet   -RD consult   -Hypoglycemia Protocol   -Transitions of care pharmacist following: please refer to pharmacotherapy note     -For discharge: Intended dc plan if pt discharged today: Lantus 36 units at bedtime + novolog  28 units premeal TID + Ozempic 0.25mg sq qweekly if tolerating on 5th week increase Ozempic 0.5 mg sq weekly (covered as per JANIE pharmacist).    -Ensure patient has working glucometer, test strips, lancets, alcohol pads, and BD radha pen needles    -For severe hypoglycemia: Please prescribe Glucose tablets 4G (take 4 tablets) or 15G tablets for blood sugar less than 70 mG/dL repeat fingerstick in 15 minutes.    - pt stated that in past she was on mounjaro and had chest pains- does not want to try mounjaro again, but she agreed to trying ozempic- please send script for ozempic 0.25mg weekly to check coverage    - pt will need outpt ophthalmology appt as not had one recently- important prior to starting glp1.    -JANIE pharmacist Lindsay following and assisting with dc planning and teaching.     -Will need outpatient follow up with podiatry and ophthalmology and urine alb/cr ratio     -Please follow up at [facility] [address] [place] [address]; [phone]:  email sent on12/2     2. HTN-  -BP goal is < 130/80  -Please obtain urine micro albumin cr ratio as an outpt   -Management as per primary team    3. Hyperlipidemia-  -agree with c/w crestor 20mg p.o. qhs if no contraindications   -LDL 86 above goal   -LDL goal <70  -lipid panel done yearly outpt    ELIECER Chowdary-BC  Nurse Practitioner  Division of Endocrinology & Diabetes  pager [phone]     If before 9AM or after 6PM, or on weekends/holidays, please call endocrine answering service for assistance ([phone]).For nonurgent matters email [email or web address] for assistance. 
Pt is a 65 y/o female PMH of htn, uncontrolled Type 2 DM, asthma presents w/ complaint of cough x1 month, right foot pain/swelling x3 weeks and urinary frequency x3 weeks and found to have hyperglycemia and sepsis.       High level medical complexity with high level decision making.      1. Type 2 DM- uncontrolled with Hba1c 13.4%  -GFR- 113  -home regimen: lantus 28 units at bedtime and novolog 30 units TID- has GCM- dexcome at home- says that they keep falling off.   -Blood glucose target is 100 to 180: Pt did not get morning FS checked as pt was not on unit. FS was not checked until 1130am and was above goal. FS last night at dinner time and bedtime were at goal. Therefore will keep regimen the same for today and continue to monitor FS and adjust if needed tomorrow based on glucose, doses were increased yesterday.  -Encouraged a carbohydrate consistent diet and avoiding regular sodas  -Continue Lantus 31 units SQ QHS  -Continue Admelog 28 units QAC. hold if not eating or NPO.   -Will continue moderate correction scale QAC and separate moderate scale QHS  -monitor FS with meals and at bedtime.   -For discharge: she would like to follow up with our [facility]- email sent today 12/2  -For discharge: will discharge on basal bolus regimen and consider GLP-1 RA- pt stated that in past she was on mounjaro and had chest pains- does not want to try mounjaro again, but she agreed to trying ozempic- please send script for ozempic 0.25mg weekly to check coverage- pt will need outpt ophthalmology appt as not had one recently- important prior to starting glp1.  -JANIE pharmacist Lindsay following and assisting with dc planning and teaching.   -Will need outpatient follow up with ophthalmology and urine alb/cr ratio     2. HTN-  -BP goal is < 130/80  -Management as per primary team    3. Hyperlipidemia-  -agree with c/w crestor 20mg  -LDL 86  -LDL goal <70  -lipid panel done yearly outpt    ELIECER Chowdary-BC  Nurse Practitioner  Division of Endocrinology & Diabetes  pager [phone] 
Pt is a 65 y/o female PMH of htn, uncontrolled Type 2 DM, asthma presents w/ complaint of cough x1 month, right foot pain/swelling x3 weeks and urinary frequency x3 weeks and found to have hyperglycemia and sepsis.     1. Type 2 DM- uncontrolled with Hba1c 13.4%  -GFR- 113  -home regimen: lantus 28 units at bedtime and novolog 30 units TID- has GCM- dexcome at home- says that they keep falling off.       While inpatient:   -Blood glucose target is 100 to 180: above goal. Pt eating in between meals. Advised pt to avoid eating in between meals   -Increase Lantus to 36 units SQ QHS  -Increase Admelog to 32 units TID AC  hold if not eating or NPO.   -Continue moderate Admelog correction scale QAC and separate moderate Admelog correction scale QHS  -monitor FS with meals and at bedtime.   -consistent carb diet   -RD consult   -Hypoglycemia Protocol   -Transitions of care pharmacist following: please refer to pharmacotherapy note     -For discharge: will discharge on basal bolus regimen + Ozempic 0.25mg sq qweekly if tolerating on 5th week increase Ozempic 0.5 mg sq weekly (please check for insurance coverage); primary team is aware     -Ensure patient has working glucometer, test strips, lancets, alcohol pads, and BD radha pen needles    -For severe hypoglycemia: Please prescribe Glucose tablets 4G (take 4 tablets) or 15G tablets for blood sugar less than 70 mG/dL repeat fingerstick in 15 minutes.    - pt stated that in past she was on mounjaro and had chest pains- does not want to try mounjaro again, but she agreed to trying ozempic- please send script for ozempic 0.25mg weekly to check coverage    - pt will need outpt ophthalmology appt as not had one recently- important prior to starting glp1.    -JANIE pharmacist Lindsay following and assisting with dc planning and teaching.     -Will need outpatient follow up with podiatry and ophthalmology and urine alb/cr ratio     -Please follow up at [facility] at [place] [address] [place] [address]; [phone]:  email sent on12/2     2. HTN-  -BP goal is < 130/80  -Please obtain urine micro albumin cr ratio as an outpt   -Management as per primary team    3. Hyperlipidemia-  -agree with c/w crestor 20mg p.o. qhs if no contraindications   -LDL 86 above goal   -LDL goal <70  -lipid panel done yearly outpt    D/w Adrian Leyva  Nurse Practitioner  Division of Endocrinology & Diabetes  In house pager #[phone]    If before 9AM or after 6PM, or on weekends/holidays, please call endocrine answering service for assistance ([phone]).For nonurgent matters email [email or web address] for assistance. 
Pt is a 65 y/o female pmh htn, dm, asthma presents w/ complaint of cough x1 month, right foot pain/swelling x3 weeks and urinary frequency x3 weeks. Pt admitted for uncontrolled DM w/ hyperglycemia and sepsis 2/2 UTI, pna and cellulitis. 
Pt is a 63 y/o female pmh htn, dm, asthma presents w/ complaint of cough x1 month, right foot pain/swelling x3 weeks and urinary frequency x3 weeks. Pt admitted for uncontrolled DM w/ hyperglycemia and sepsis 2/2 UTI, pna and cellulitis. 
Pt is a 65 y/o female pmh htn, dm, asthma presents w/ complaint of cough x1 month, right foot pain/swelling x3 weeks and urinary frequency x3 weeks. Pt admitted for uncontrolled DM w/ hyperglycemia and sepsis 2/2 UTI, pna and cellulitis. 
Pt is a 65 y/o female pmh htn, dm, asthma presents w/ complaint of cough x1 month, right foot pain/swelling x3 weeks and urinary frequency x3 weeks. Pt admitted for uncontrolled DM w/ hyperglycemia and sepsis 2/2 UTI, pna and cellulitis.

## 2024-12-07 LAB
CULTURE RESULTS: ABNORMAL
CULTURE RESULTS: SIGNIFICANT CHANGE UP
CULTURE RESULTS: SIGNIFICANT CHANGE UP
SPECIMEN SOURCE: SIGNIFICANT CHANGE UP

## 2025-04-08 ENCOUNTER — APPOINTMENT (OUTPATIENT)
Dept: ENDOCRINOLOGY | Facility: CLINIC | Age: 65
End: 2025-04-08